# Patient Record
Sex: FEMALE | Race: OTHER | NOT HISPANIC OR LATINO
[De-identification: names, ages, dates, MRNs, and addresses within clinical notes are randomized per-mention and may not be internally consistent; named-entity substitution may affect disease eponyms.]

---

## 2017-01-05 ENCOUNTER — FORM ENCOUNTER (OUTPATIENT)
Age: 52
End: 2017-01-05

## 2017-01-06 ENCOUNTER — OUTPATIENT (OUTPATIENT)
Dept: OUTPATIENT SERVICES | Facility: HOSPITAL | Age: 52
LOS: 1 days | End: 2017-01-06
Payer: SELF-PAY

## 2017-01-06 ENCOUNTER — APPOINTMENT (OUTPATIENT)
Dept: ULTRASOUND IMAGING | Facility: IMAGING CENTER | Age: 52
End: 2017-01-06

## 2017-01-06 ENCOUNTER — APPOINTMENT (OUTPATIENT)
Dept: PEDIATRIC ALLERGY IMMUNOLOGY | Facility: CLINIC | Age: 52
End: 2017-01-06

## 2017-01-06 DIAGNOSIS — Z00.8 ENCOUNTER FOR OTHER GENERAL EXAMINATION: ICD-10-CM

## 2017-01-06 PROCEDURE — 76856 US EXAM PELVIC COMPLETE: CPT

## 2017-01-06 PROCEDURE — 76830 TRANSVAGINAL US NON-OB: CPT

## 2017-01-06 PROCEDURE — 76536 US EXAM OF HEAD AND NECK: CPT

## 2017-02-10 ENCOUNTER — APPOINTMENT (OUTPATIENT)
Dept: PEDIATRIC ALLERGY IMMUNOLOGY | Facility: CLINIC | Age: 52
End: 2017-02-10

## 2017-02-10 ENCOUNTER — OUTPATIENT (OUTPATIENT)
Dept: OUTPATIENT SERVICES | Facility: HOSPITAL | Age: 52
LOS: 1 days | End: 2017-02-10
Payer: SELF-PAY

## 2017-02-10 VITALS
WEIGHT: 215.99 LBS | OXYGEN SATURATION: 99 % | BODY MASS INDEX: 33.9 KG/M2 | SYSTOLIC BLOOD PRESSURE: 169 MMHG | HEART RATE: 61 BPM | HEIGHT: 67 IN | DIASTOLIC BLOOD PRESSURE: 99 MMHG

## 2017-02-10 DIAGNOSIS — Z91.018 ALLERGY TO OTHER FOODS: ICD-10-CM

## 2017-02-10 DIAGNOSIS — J30.9 ALLERGIC RHINITIS, UNSPECIFIED: ICD-10-CM

## 2017-02-10 PROCEDURE — G0463: CPT

## 2017-02-15 DIAGNOSIS — J30.89 OTHER ALLERGIC RHINITIS: ICD-10-CM

## 2017-02-15 DIAGNOSIS — H10.13 ACUTE ATOPIC CONJUNCTIVITIS, BILATERAL: ICD-10-CM

## 2017-02-15 DIAGNOSIS — Z91.018 ALLERGY TO OTHER FOODS: ICD-10-CM

## 2017-02-15 DIAGNOSIS — T78.1XXA OTHER ADVERSE FOOD REACTIONS, NOT ELSEWHERE CLASSIFIED, INITIAL ENCOUNTER: ICD-10-CM

## 2017-02-15 DIAGNOSIS — Z91.038 OTHER INSECT ALLERGY STATUS: ICD-10-CM

## 2017-02-15 DIAGNOSIS — Z91.010 ALLERGY TO PEANUTS: ICD-10-CM

## 2017-02-15 DIAGNOSIS — T78.1XXD OTHER ADVERSE FOOD REACTIONS, NOT ELSEWHERE CLASSIFIED, SUBSEQUENT ENCOUNTER: ICD-10-CM

## 2017-02-15 DIAGNOSIS — J30.1 ALLERGIC RHINITIS DUE TO POLLEN: ICD-10-CM

## 2017-02-15 DIAGNOSIS — J30.81 ALLERGIC RHINITIS DUE TO ANIMAL (CAT) (DOG) HAIR AND DANDER: ICD-10-CM

## 2017-02-24 ENCOUNTER — APPOINTMENT (OUTPATIENT)
Dept: INTERNAL MEDICINE | Facility: CLINIC | Age: 52
End: 2017-02-24

## 2017-03-24 ENCOUNTER — APPOINTMENT (OUTPATIENT)
Dept: INTERNAL MEDICINE | Facility: CLINIC | Age: 52
End: 2017-03-24

## 2017-03-31 ENCOUNTER — OUTPATIENT (OUTPATIENT)
Dept: OUTPATIENT SERVICES | Facility: HOSPITAL | Age: 52
LOS: 1 days | End: 2017-03-31
Payer: SELF-PAY

## 2017-03-31 ENCOUNTER — APPOINTMENT (OUTPATIENT)
Dept: INTERNAL MEDICINE | Facility: CLINIC | Age: 52
End: 2017-03-31

## 2017-03-31 ENCOUNTER — APPOINTMENT (OUTPATIENT)
Dept: PEDIATRIC ALLERGY IMMUNOLOGY | Facility: CLINIC | Age: 52
End: 2017-03-31

## 2017-03-31 VITALS
HEART RATE: 83 BPM | DIASTOLIC BLOOD PRESSURE: 88 MMHG | SYSTOLIC BLOOD PRESSURE: 135 MMHG | BODY MASS INDEX: 34.55 KG/M2 | WEIGHT: 220.59 LBS

## 2017-03-31 VITALS
WEIGHT: 220.56 LBS | SYSTOLIC BLOOD PRESSURE: 130 MMHG | HEIGHT: 67 IN | BODY MASS INDEX: 34.62 KG/M2 | DIASTOLIC BLOOD PRESSURE: 84 MMHG | HEART RATE: 80 BPM

## 2017-03-31 DIAGNOSIS — Z91.018 ALLERGY TO OTHER FOODS: ICD-10-CM

## 2017-03-31 DIAGNOSIS — J30.9 ALLERGIC RHINITIS, UNSPECIFIED: ICD-10-CM

## 2017-03-31 DIAGNOSIS — Z91.038 OTHER INSECT ALLERGY STATUS: ICD-10-CM

## 2017-03-31 DIAGNOSIS — I10 ESSENTIAL (PRIMARY) HYPERTENSION: ICD-10-CM

## 2017-03-31 DIAGNOSIS — T78.1XXA OTHER ADVERSE FOOD REACTIONS, NOT ELSEWHERE CLASSIFIED, INITIAL ENCOUNTER: ICD-10-CM

## 2017-03-31 DIAGNOSIS — H10.13 ACUTE ATOPIC CONJUNCTIVITIS, BILATERAL: ICD-10-CM

## 2017-03-31 PROCEDURE — 95024 IQ TESTS W/ALLERGENIC XTRCS: CPT

## 2017-03-31 PROCEDURE — G0463: CPT

## 2017-03-31 PROCEDURE — G0463: CPT | Mod: 25

## 2017-04-03 DIAGNOSIS — D17.1 BENIGN LIPOMATOUS NEOPLASM OF SKIN AND SUBCUTANEOUS TISSUE OF TRUNK: ICD-10-CM

## 2017-04-04 DIAGNOSIS — Z91.038 OTHER INSECT ALLERGY STATUS: ICD-10-CM

## 2017-04-04 DIAGNOSIS — J30.89 OTHER ALLERGIC RHINITIS: ICD-10-CM

## 2017-04-04 DIAGNOSIS — J30.81 ALLERGIC RHINITIS DUE TO ANIMAL (CAT) (DOG) HAIR AND DANDER: ICD-10-CM

## 2017-04-04 DIAGNOSIS — J30.1 ALLERGIC RHINITIS DUE TO POLLEN: ICD-10-CM

## 2017-04-04 DIAGNOSIS — H10.13 ACUTE ATOPIC CONJUNCTIVITIS, BILATERAL: ICD-10-CM

## 2017-04-14 ENCOUNTER — OUTPATIENT (OUTPATIENT)
Dept: OUTPATIENT SERVICES | Facility: HOSPITAL | Age: 52
LOS: 1 days | End: 2017-04-14
Payer: SELF-PAY

## 2017-04-14 ENCOUNTER — APPOINTMENT (OUTPATIENT)
Dept: PEDIATRIC ALLERGY IMMUNOLOGY | Facility: CLINIC | Age: 52
End: 2017-04-14

## 2017-04-14 DIAGNOSIS — J30.9 ALLERGIC RHINITIS, UNSPECIFIED: ICD-10-CM

## 2017-04-14 PROCEDURE — 95165 ANTIGEN THERAPY SERVICES: CPT

## 2017-04-14 PROCEDURE — 95117 IMMUNOTHERAPY INJECTIONS: CPT

## 2017-04-17 ENCOUNTER — OUTPATIENT (OUTPATIENT)
Dept: OUTPATIENT SERVICES | Facility: HOSPITAL | Age: 52
LOS: 1 days | End: 2017-04-17
Payer: SELF-PAY

## 2017-04-17 ENCOUNTER — APPOINTMENT (OUTPATIENT)
Dept: SURGERY | Facility: HOSPITAL | Age: 52
End: 2017-04-17

## 2017-04-17 VITALS
HEART RATE: 55 BPM | DIASTOLIC BLOOD PRESSURE: 82 MMHG | HEIGHT: 67 IN | RESPIRATION RATE: 14 BRPM | SYSTOLIC BLOOD PRESSURE: 139 MMHG | WEIGHT: 218 LBS | BODY MASS INDEX: 34.21 KG/M2

## 2017-04-17 DIAGNOSIS — L72.3 SEBACEOUS CYST: ICD-10-CM

## 2017-04-17 PROCEDURE — G0463: CPT

## 2017-04-18 ENCOUNTER — RX RENEWAL (OUTPATIENT)
Age: 52
End: 2017-04-18

## 2017-04-21 ENCOUNTER — OUTPATIENT (OUTPATIENT)
Dept: OUTPATIENT SERVICES | Facility: HOSPITAL | Age: 52
LOS: 1 days | End: 2017-04-21
Payer: SELF-PAY

## 2017-04-21 ENCOUNTER — APPOINTMENT (OUTPATIENT)
Dept: PEDIATRIC ALLERGY IMMUNOLOGY | Facility: CLINIC | Age: 52
End: 2017-04-21

## 2017-04-21 DIAGNOSIS — J30.9 ALLERGIC RHINITIS, UNSPECIFIED: ICD-10-CM

## 2017-04-21 PROCEDURE — 95117 IMMUNOTHERAPY INJECTIONS: CPT

## 2017-04-21 PROCEDURE — 95165 ANTIGEN THERAPY SERVICES: CPT

## 2017-04-24 DIAGNOSIS — Z51.6 ENCOUNTER FOR DESENSITIZATION TO ALLERGENS: ICD-10-CM

## 2017-04-24 DIAGNOSIS — J30.1 ALLERGIC RHINITIS DUE TO POLLEN: ICD-10-CM

## 2017-04-24 DIAGNOSIS — J30.89 OTHER ALLERGIC RHINITIS: ICD-10-CM

## 2017-04-24 DIAGNOSIS — J30.81 ALLERGIC RHINITIS DUE TO ANIMAL (CAT) (DOG) HAIR AND DANDER: ICD-10-CM

## 2017-04-27 DIAGNOSIS — Z51.6 ENCOUNTER FOR DESENSITIZATION TO ALLERGENS: ICD-10-CM

## 2017-04-27 DIAGNOSIS — J30.89 OTHER ALLERGIC RHINITIS: ICD-10-CM

## 2017-04-27 DIAGNOSIS — J30.81 ALLERGIC RHINITIS DUE TO ANIMAL (CAT) (DOG) HAIR AND DANDER: ICD-10-CM

## 2017-04-27 DIAGNOSIS — J30.1 ALLERGIC RHINITIS DUE TO POLLEN: ICD-10-CM

## 2017-04-28 ENCOUNTER — APPOINTMENT (OUTPATIENT)
Dept: PEDIATRIC ALLERGY IMMUNOLOGY | Facility: CLINIC | Age: 52
End: 2017-04-28

## 2017-04-28 ENCOUNTER — OUTPATIENT (OUTPATIENT)
Dept: OUTPATIENT SERVICES | Facility: HOSPITAL | Age: 52
LOS: 1 days | End: 2017-04-28
Payer: SELF-PAY

## 2017-04-28 DIAGNOSIS — J30.9 ALLERGIC RHINITIS, UNSPECIFIED: ICD-10-CM

## 2017-04-28 PROCEDURE — 95165 ANTIGEN THERAPY SERVICES: CPT

## 2017-04-28 PROCEDURE — 95117 IMMUNOTHERAPY INJECTIONS: CPT

## 2017-05-04 DIAGNOSIS — J30.81 ALLERGIC RHINITIS DUE TO ANIMAL (CAT) (DOG) HAIR AND DANDER: ICD-10-CM

## 2017-05-04 DIAGNOSIS — J30.1 ALLERGIC RHINITIS DUE TO POLLEN: ICD-10-CM

## 2017-05-04 DIAGNOSIS — Z51.6 ENCOUNTER FOR DESENSITIZATION TO ALLERGENS: ICD-10-CM

## 2017-05-04 DIAGNOSIS — J30.89 OTHER ALLERGIC RHINITIS: ICD-10-CM

## 2017-05-05 ENCOUNTER — OUTPATIENT (OUTPATIENT)
Dept: OUTPATIENT SERVICES | Facility: HOSPITAL | Age: 52
LOS: 1 days | End: 2017-05-05
Payer: SELF-PAY

## 2017-05-05 ENCOUNTER — APPOINTMENT (OUTPATIENT)
Dept: PEDIATRIC ALLERGY IMMUNOLOGY | Facility: CLINIC | Age: 52
End: 2017-05-05

## 2017-05-05 DIAGNOSIS — J30.9 ALLERGIC RHINITIS, UNSPECIFIED: ICD-10-CM

## 2017-05-05 PROCEDURE — 95165 ANTIGEN THERAPY SERVICES: CPT

## 2017-05-05 PROCEDURE — 95117 IMMUNOTHERAPY INJECTIONS: CPT

## 2017-05-08 DIAGNOSIS — J30.81 ALLERGIC RHINITIS DUE TO ANIMAL (CAT) (DOG) HAIR AND DANDER: ICD-10-CM

## 2017-05-08 DIAGNOSIS — J30.1 ALLERGIC RHINITIS DUE TO POLLEN: ICD-10-CM

## 2017-05-08 DIAGNOSIS — Z51.6 ENCOUNTER FOR DESENSITIZATION TO ALLERGENS: ICD-10-CM

## 2017-05-08 DIAGNOSIS — J30.89 OTHER ALLERGIC RHINITIS: ICD-10-CM

## 2017-05-12 ENCOUNTER — APPOINTMENT (OUTPATIENT)
Dept: PEDIATRIC ALLERGY IMMUNOLOGY | Facility: CLINIC | Age: 52
End: 2017-05-12

## 2017-05-12 ENCOUNTER — OUTPATIENT (OUTPATIENT)
Dept: OUTPATIENT SERVICES | Facility: HOSPITAL | Age: 52
LOS: 1 days | End: 2017-05-12
Payer: SELF-PAY

## 2017-05-12 DIAGNOSIS — J30.9 ALLERGIC RHINITIS, UNSPECIFIED: ICD-10-CM

## 2017-05-12 PROCEDURE — 95117 IMMUNOTHERAPY INJECTIONS: CPT

## 2017-05-12 PROCEDURE — 95165 ANTIGEN THERAPY SERVICES: CPT

## 2017-05-19 ENCOUNTER — OUTPATIENT (OUTPATIENT)
Dept: OUTPATIENT SERVICES | Facility: HOSPITAL | Age: 52
LOS: 1 days | End: 2017-05-19
Payer: SELF-PAY

## 2017-05-19 ENCOUNTER — APPOINTMENT (OUTPATIENT)
Dept: PEDIATRIC ALLERGY IMMUNOLOGY | Facility: CLINIC | Age: 52
End: 2017-05-19

## 2017-05-19 DIAGNOSIS — J30.9 ALLERGIC RHINITIS, UNSPECIFIED: ICD-10-CM

## 2017-05-19 PROCEDURE — 95165 ANTIGEN THERAPY SERVICES: CPT

## 2017-05-19 PROCEDURE — 95117 IMMUNOTHERAPY INJECTIONS: CPT

## 2017-05-22 DIAGNOSIS — J30.89 OTHER ALLERGIC RHINITIS: ICD-10-CM

## 2017-05-22 DIAGNOSIS — J30.81 ALLERGIC RHINITIS DUE TO ANIMAL (CAT) (DOG) HAIR AND DANDER: ICD-10-CM

## 2017-05-22 DIAGNOSIS — J30.1 ALLERGIC RHINITIS DUE TO POLLEN: ICD-10-CM

## 2017-05-22 DIAGNOSIS — Z51.6 ENCOUNTER FOR DESENSITIZATION TO ALLERGENS: ICD-10-CM

## 2017-05-23 DIAGNOSIS — J30.89 OTHER ALLERGIC RHINITIS: ICD-10-CM

## 2017-05-23 DIAGNOSIS — Z51.6 ENCOUNTER FOR DESENSITIZATION TO ALLERGENS: ICD-10-CM

## 2017-05-23 DIAGNOSIS — J30.1 ALLERGIC RHINITIS DUE TO POLLEN: ICD-10-CM

## 2017-05-23 DIAGNOSIS — J30.81 ALLERGIC RHINITIS DUE TO ANIMAL (CAT) (DOG) HAIR AND DANDER: ICD-10-CM

## 2017-05-26 ENCOUNTER — OUTPATIENT (OUTPATIENT)
Dept: OUTPATIENT SERVICES | Facility: HOSPITAL | Age: 52
LOS: 1 days | End: 2017-05-26
Payer: SELF-PAY

## 2017-05-26 ENCOUNTER — APPOINTMENT (OUTPATIENT)
Dept: PEDIATRIC ALLERGY IMMUNOLOGY | Facility: CLINIC | Age: 52
End: 2017-05-26

## 2017-05-26 DIAGNOSIS — J30.9 ALLERGIC RHINITIS, UNSPECIFIED: ICD-10-CM

## 2017-05-26 PROCEDURE — 95117 IMMUNOTHERAPY INJECTIONS: CPT

## 2017-05-26 PROCEDURE — 95165 ANTIGEN THERAPY SERVICES: CPT

## 2017-06-02 ENCOUNTER — RX RENEWAL (OUTPATIENT)
Age: 52
End: 2017-06-02

## 2017-06-02 ENCOUNTER — APPOINTMENT (OUTPATIENT)
Dept: PEDIATRIC ALLERGY IMMUNOLOGY | Facility: CLINIC | Age: 52
End: 2017-06-02

## 2017-06-02 ENCOUNTER — OUTPATIENT (OUTPATIENT)
Dept: OUTPATIENT SERVICES | Facility: HOSPITAL | Age: 52
LOS: 1 days | End: 2017-06-02
Payer: SELF-PAY

## 2017-06-02 DIAGNOSIS — J30.9 ALLERGIC RHINITIS, UNSPECIFIED: ICD-10-CM

## 2017-06-02 PROCEDURE — 95165 ANTIGEN THERAPY SERVICES: CPT

## 2017-06-02 PROCEDURE — 95117 IMMUNOTHERAPY INJECTIONS: CPT

## 2017-06-05 DIAGNOSIS — J30.89 OTHER ALLERGIC RHINITIS: ICD-10-CM

## 2017-06-05 DIAGNOSIS — Z51.6 ENCOUNTER FOR DESENSITIZATION TO ALLERGENS: ICD-10-CM

## 2017-06-05 DIAGNOSIS — J30.1 ALLERGIC RHINITIS DUE TO POLLEN: ICD-10-CM

## 2017-06-05 DIAGNOSIS — J30.81 ALLERGIC RHINITIS DUE TO ANIMAL (CAT) (DOG) HAIR AND DANDER: ICD-10-CM

## 2017-06-08 DIAGNOSIS — J30.81 ALLERGIC RHINITIS DUE TO ANIMAL (CAT) (DOG) HAIR AND DANDER: ICD-10-CM

## 2017-06-08 DIAGNOSIS — Z51.6 ENCOUNTER FOR DESENSITIZATION TO ALLERGENS: ICD-10-CM

## 2017-06-08 DIAGNOSIS — J30.89 OTHER ALLERGIC RHINITIS: ICD-10-CM

## 2017-06-08 DIAGNOSIS — J30.1 ALLERGIC RHINITIS DUE TO POLLEN: ICD-10-CM

## 2017-06-09 ENCOUNTER — APPOINTMENT (OUTPATIENT)
Dept: PEDIATRIC ALLERGY IMMUNOLOGY | Facility: CLINIC | Age: 52
End: 2017-06-09

## 2017-06-16 ENCOUNTER — OUTPATIENT (OUTPATIENT)
Dept: OUTPATIENT SERVICES | Facility: HOSPITAL | Age: 52
LOS: 1 days | End: 2017-06-16
Payer: SELF-PAY

## 2017-06-16 ENCOUNTER — APPOINTMENT (OUTPATIENT)
Dept: PEDIATRIC ALLERGY IMMUNOLOGY | Facility: CLINIC | Age: 52
End: 2017-06-16

## 2017-06-16 DIAGNOSIS — J30.9 ALLERGIC RHINITIS, UNSPECIFIED: ICD-10-CM

## 2017-06-16 PROCEDURE — 95165 ANTIGEN THERAPY SERVICES: CPT

## 2017-06-16 PROCEDURE — 95117 IMMUNOTHERAPY INJECTIONS: CPT

## 2017-06-23 DIAGNOSIS — J30.81 ALLERGIC RHINITIS DUE TO ANIMAL (CAT) (DOG) HAIR AND DANDER: ICD-10-CM

## 2017-06-23 DIAGNOSIS — Z51.6 ENCOUNTER FOR DESENSITIZATION TO ALLERGENS: ICD-10-CM

## 2017-06-23 DIAGNOSIS — J30.89 OTHER ALLERGIC RHINITIS: ICD-10-CM

## 2017-06-23 DIAGNOSIS — J30.1 ALLERGIC RHINITIS DUE TO POLLEN: ICD-10-CM

## 2017-06-30 ENCOUNTER — APPOINTMENT (OUTPATIENT)
Dept: PEDIATRIC ALLERGY IMMUNOLOGY | Facility: CLINIC | Age: 52
End: 2017-06-30

## 2017-06-30 ENCOUNTER — OUTPATIENT (OUTPATIENT)
Dept: OUTPATIENT SERVICES | Facility: HOSPITAL | Age: 52
LOS: 1 days | End: 2017-06-30
Payer: SELF-PAY

## 2017-06-30 DIAGNOSIS — J30.9 ALLERGIC RHINITIS, UNSPECIFIED: ICD-10-CM

## 2017-06-30 PROCEDURE — 95165 ANTIGEN THERAPY SERVICES: CPT

## 2017-06-30 PROCEDURE — 95117 IMMUNOTHERAPY INJECTIONS: CPT

## 2017-07-03 DIAGNOSIS — Z51.6 ENCOUNTER FOR DESENSITIZATION TO ALLERGENS: ICD-10-CM

## 2017-07-03 DIAGNOSIS — J30.81 ALLERGIC RHINITIS DUE TO ANIMAL (CAT) (DOG) HAIR AND DANDER: ICD-10-CM

## 2017-07-03 DIAGNOSIS — J30.89 OTHER ALLERGIC RHINITIS: ICD-10-CM

## 2017-07-03 DIAGNOSIS — J30.1 ALLERGIC RHINITIS DUE TO POLLEN: ICD-10-CM

## 2017-07-07 ENCOUNTER — OUTPATIENT (OUTPATIENT)
Dept: OUTPATIENT SERVICES | Facility: HOSPITAL | Age: 52
LOS: 1 days | End: 2017-07-07
Payer: SELF-PAY

## 2017-07-07 ENCOUNTER — APPOINTMENT (OUTPATIENT)
Dept: PEDIATRIC ALLERGY IMMUNOLOGY | Facility: CLINIC | Age: 52
End: 2017-07-07

## 2017-07-07 DIAGNOSIS — J30.9 ALLERGIC RHINITIS, UNSPECIFIED: ICD-10-CM

## 2017-07-07 PROCEDURE — 95165 ANTIGEN THERAPY SERVICES: CPT

## 2017-07-07 PROCEDURE — 95117 IMMUNOTHERAPY INJECTIONS: CPT

## 2017-07-14 ENCOUNTER — APPOINTMENT (OUTPATIENT)
Dept: PEDIATRIC ALLERGY IMMUNOLOGY | Facility: CLINIC | Age: 52
End: 2017-07-14

## 2017-07-14 ENCOUNTER — OUTPATIENT (OUTPATIENT)
Dept: OUTPATIENT SERVICES | Facility: HOSPITAL | Age: 52
LOS: 1 days | End: 2017-07-14
Payer: SELF-PAY

## 2017-07-14 DIAGNOSIS — J30.89 OTHER ALLERGIC RHINITIS: ICD-10-CM

## 2017-07-14 DIAGNOSIS — J30.81 ALLERGIC RHINITIS DUE TO ANIMAL (CAT) (DOG) HAIR AND DANDER: ICD-10-CM

## 2017-07-14 DIAGNOSIS — J30.9 ALLERGIC RHINITIS, UNSPECIFIED: ICD-10-CM

## 2017-07-14 DIAGNOSIS — Z51.6 ENCOUNTER FOR DESENSITIZATION TO ALLERGENS: ICD-10-CM

## 2017-07-14 DIAGNOSIS — J30.1 ALLERGIC RHINITIS DUE TO POLLEN: ICD-10-CM

## 2017-07-14 PROCEDURE — 95117 IMMUNOTHERAPY INJECTIONS: CPT

## 2017-07-14 PROCEDURE — 95165 ANTIGEN THERAPY SERVICES: CPT

## 2017-07-17 ENCOUNTER — APPOINTMENT (OUTPATIENT)
Dept: OTOLARYNGOLOGY | Facility: CLINIC | Age: 52
End: 2017-07-17

## 2017-07-17 DIAGNOSIS — J30.89 OTHER ALLERGIC RHINITIS: ICD-10-CM

## 2017-07-17 DIAGNOSIS — Z51.6 ENCOUNTER FOR DESENSITIZATION TO ALLERGENS: ICD-10-CM

## 2017-07-17 DIAGNOSIS — J30.81 ALLERGIC RHINITIS DUE TO ANIMAL (CAT) (DOG) HAIR AND DANDER: ICD-10-CM

## 2017-07-17 DIAGNOSIS — J30.1 ALLERGIC RHINITIS DUE TO POLLEN: ICD-10-CM

## 2017-07-21 ENCOUNTER — OUTPATIENT (OUTPATIENT)
Dept: OUTPATIENT SERVICES | Facility: HOSPITAL | Age: 52
LOS: 1 days | End: 2017-07-21
Payer: SELF-PAY

## 2017-07-21 ENCOUNTER — APPOINTMENT (OUTPATIENT)
Dept: PEDIATRIC ALLERGY IMMUNOLOGY | Facility: CLINIC | Age: 52
End: 2017-07-21

## 2017-07-21 DIAGNOSIS — J30.9 ALLERGIC RHINITIS, UNSPECIFIED: ICD-10-CM

## 2017-07-21 PROCEDURE — 95117 IMMUNOTHERAPY INJECTIONS: CPT

## 2017-07-21 PROCEDURE — 95165 ANTIGEN THERAPY SERVICES: CPT

## 2017-07-25 DIAGNOSIS — J30.89 OTHER ALLERGIC RHINITIS: ICD-10-CM

## 2017-07-25 DIAGNOSIS — J30.1 ALLERGIC RHINITIS DUE TO POLLEN: ICD-10-CM

## 2017-07-25 DIAGNOSIS — Z51.6 ENCOUNTER FOR DESENSITIZATION TO ALLERGENS: ICD-10-CM

## 2017-07-25 DIAGNOSIS — J30.81 ALLERGIC RHINITIS DUE TO ANIMAL (CAT) (DOG) HAIR AND DANDER: ICD-10-CM

## 2017-07-28 ENCOUNTER — OUTPATIENT (OUTPATIENT)
Dept: OUTPATIENT SERVICES | Facility: HOSPITAL | Age: 52
LOS: 1 days | End: 2017-07-28
Payer: SELF-PAY

## 2017-07-28 ENCOUNTER — APPOINTMENT (OUTPATIENT)
Dept: PEDIATRIC ALLERGY IMMUNOLOGY | Facility: CLINIC | Age: 52
End: 2017-07-28
Payer: COMMERCIAL

## 2017-07-28 DIAGNOSIS — J30.9 ALLERGIC RHINITIS, UNSPECIFIED: ICD-10-CM

## 2017-07-28 PROCEDURE — 95165 ANTIGEN THERAPY SERVICES: CPT | Mod: NC

## 2017-07-28 PROCEDURE — 95165 ANTIGEN THERAPY SERVICES: CPT

## 2017-07-28 PROCEDURE — 95117 IMMUNOTHERAPY INJECTIONS: CPT | Mod: NC

## 2017-07-28 PROCEDURE — 95117 IMMUNOTHERAPY INJECTIONS: CPT

## 2017-08-03 DIAGNOSIS — Z51.6 ENCOUNTER FOR DESENSITIZATION TO ALLERGENS: ICD-10-CM

## 2017-08-03 DIAGNOSIS — J30.81 ALLERGIC RHINITIS DUE TO ANIMAL (CAT) (DOG) HAIR AND DANDER: ICD-10-CM

## 2017-08-03 DIAGNOSIS — J30.1 ALLERGIC RHINITIS DUE TO POLLEN: ICD-10-CM

## 2017-08-03 DIAGNOSIS — J30.89 OTHER ALLERGIC RHINITIS: ICD-10-CM

## 2017-08-04 ENCOUNTER — APPOINTMENT (OUTPATIENT)
Dept: PEDIATRIC ALLERGY IMMUNOLOGY | Facility: CLINIC | Age: 52
End: 2017-08-04

## 2017-08-11 ENCOUNTER — APPOINTMENT (OUTPATIENT)
Dept: PEDIATRIC ALLERGY IMMUNOLOGY | Facility: CLINIC | Age: 52
End: 2017-08-11
Payer: COMMERCIAL

## 2017-08-11 ENCOUNTER — OUTPATIENT (OUTPATIENT)
Dept: OUTPATIENT SERVICES | Facility: HOSPITAL | Age: 52
LOS: 1 days | End: 2017-08-11
Payer: SELF-PAY

## 2017-08-11 DIAGNOSIS — J30.9 ALLERGIC RHINITIS, UNSPECIFIED: ICD-10-CM

## 2017-08-11 PROCEDURE — 95117 IMMUNOTHERAPY INJECTIONS: CPT | Mod: NC

## 2017-08-11 PROCEDURE — 95165 ANTIGEN THERAPY SERVICES: CPT | Mod: NC

## 2017-08-11 PROCEDURE — 95165 ANTIGEN THERAPY SERVICES: CPT

## 2017-08-11 PROCEDURE — 95117 IMMUNOTHERAPY INJECTIONS: CPT

## 2017-08-18 ENCOUNTER — APPOINTMENT (OUTPATIENT)
Dept: PEDIATRIC ALLERGY IMMUNOLOGY | Facility: CLINIC | Age: 52
End: 2017-08-18
Payer: COMMERCIAL

## 2017-08-18 ENCOUNTER — OUTPATIENT (OUTPATIENT)
Dept: OUTPATIENT SERVICES | Facility: HOSPITAL | Age: 52
LOS: 1 days | End: 2017-08-18
Payer: SELF-PAY

## 2017-08-18 VITALS
SYSTOLIC BLOOD PRESSURE: 146 MMHG | OXYGEN SATURATION: 98 % | HEART RATE: 59 BPM | HEIGHT: 67 IN | DIASTOLIC BLOOD PRESSURE: 90 MMHG | BODY MASS INDEX: 34.88 KG/M2 | WEIGHT: 222.2 LBS

## 2017-08-18 DIAGNOSIS — J30.81 ALLERGIC RHINITIS DUE TO ANIMAL (CAT) (DOG) HAIR AND DANDER: ICD-10-CM

## 2017-08-18 DIAGNOSIS — J01.90 ACUTE SINUSITIS, UNSPECIFIED: ICD-10-CM

## 2017-08-18 DIAGNOSIS — Z51.6 ENCOUNTER FOR DESENSITIZATION TO ALLERGENS: ICD-10-CM

## 2017-08-18 DIAGNOSIS — J30.1 ALLERGIC RHINITIS DUE TO POLLEN: ICD-10-CM

## 2017-08-18 DIAGNOSIS — J30.9 ALLERGIC RHINITIS, UNSPECIFIED: ICD-10-CM

## 2017-08-18 DIAGNOSIS — J30.89 OTHER ALLERGIC RHINITIS: ICD-10-CM

## 2017-08-18 PROCEDURE — 95115 IMMUNOTHERAPY ONE INJECTION: CPT | Mod: NC

## 2017-08-18 PROCEDURE — 95165 ANTIGEN THERAPY SERVICES: CPT | Mod: NC

## 2017-08-18 PROCEDURE — 95165 ANTIGEN THERAPY SERVICES: CPT

## 2017-08-18 PROCEDURE — 95115 IMMUNOTHERAPY ONE INJECTION: CPT

## 2017-08-18 PROCEDURE — 99213 OFFICE O/P EST LOW 20 MIN: CPT | Mod: 25,NC

## 2017-08-18 PROCEDURE — G0463: CPT | Mod: 25

## 2017-08-21 DIAGNOSIS — J30.1 ALLERGIC RHINITIS DUE TO POLLEN: ICD-10-CM

## 2017-08-21 DIAGNOSIS — J30.89 OTHER ALLERGIC RHINITIS: ICD-10-CM

## 2017-08-21 DIAGNOSIS — J30.81 ALLERGIC RHINITIS DUE TO ANIMAL (CAT) (DOG) HAIR AND DANDER: ICD-10-CM

## 2017-08-21 DIAGNOSIS — Z51.6 ENCOUNTER FOR DESENSITIZATION TO ALLERGENS: ICD-10-CM

## 2017-08-25 ENCOUNTER — OUTPATIENT (OUTPATIENT)
Dept: OUTPATIENT SERVICES | Facility: HOSPITAL | Age: 52
LOS: 1 days | End: 2017-08-25
Payer: SELF-PAY

## 2017-08-25 ENCOUNTER — APPOINTMENT (OUTPATIENT)
Dept: PEDIATRIC ALLERGY IMMUNOLOGY | Facility: CLINIC | Age: 52
End: 2017-08-25
Payer: COMMERCIAL

## 2017-08-25 DIAGNOSIS — J30.9 ALLERGIC RHINITIS, UNSPECIFIED: ICD-10-CM

## 2017-08-25 PROCEDURE — 95117 IMMUNOTHERAPY INJECTIONS: CPT | Mod: NC

## 2017-08-25 PROCEDURE — 95165 ANTIGEN THERAPY SERVICES: CPT

## 2017-08-25 PROCEDURE — 95165 ANTIGEN THERAPY SERVICES: CPT | Mod: NC

## 2017-08-25 PROCEDURE — 95117 IMMUNOTHERAPY INJECTIONS: CPT

## 2017-08-30 DIAGNOSIS — Z51.6 ENCOUNTER FOR DESENSITIZATION TO ALLERGENS: ICD-10-CM

## 2017-08-30 DIAGNOSIS — J30.1 ALLERGIC RHINITIS DUE TO POLLEN: ICD-10-CM

## 2017-08-30 DIAGNOSIS — J30.81 ALLERGIC RHINITIS DUE TO ANIMAL (CAT) (DOG) HAIR AND DANDER: ICD-10-CM

## 2017-08-30 DIAGNOSIS — J30.89 OTHER ALLERGIC RHINITIS: ICD-10-CM

## 2017-09-08 ENCOUNTER — LABORATORY RESULT (OUTPATIENT)
Age: 52
End: 2017-09-08

## 2017-09-08 ENCOUNTER — OUTPATIENT (OUTPATIENT)
Dept: OUTPATIENT SERVICES | Facility: HOSPITAL | Age: 52
LOS: 1 days | End: 2017-09-08
Payer: SELF-PAY

## 2017-09-08 ENCOUNTER — APPOINTMENT (OUTPATIENT)
Dept: INTERNAL MEDICINE | Facility: CLINIC | Age: 52
End: 2017-09-08
Payer: COMMERCIAL

## 2017-09-08 ENCOUNTER — APPOINTMENT (OUTPATIENT)
Dept: PEDIATRIC ALLERGY IMMUNOLOGY | Facility: CLINIC | Age: 52
End: 2017-09-08
Payer: COMMERCIAL

## 2017-09-08 ENCOUNTER — APPOINTMENT (OUTPATIENT)
Dept: OBGYN | Facility: CLINIC | Age: 52
End: 2017-09-08
Payer: COMMERCIAL

## 2017-09-08 VITALS
SYSTOLIC BLOOD PRESSURE: 138 MMHG | DIASTOLIC BLOOD PRESSURE: 100 MMHG | BODY MASS INDEX: 34.84 KG/M2 | WEIGHT: 222 LBS | HEIGHT: 67 IN

## 2017-09-08 VITALS
HEART RATE: 73 BPM | RESPIRATION RATE: 18 BRPM | BODY MASS INDEX: 34.37 KG/M2 | HEIGHT: 67 IN | OXYGEN SATURATION: 98 % | WEIGHT: 219 LBS | SYSTOLIC BLOOD PRESSURE: 150 MMHG | DIASTOLIC BLOOD PRESSURE: 68 MMHG

## 2017-09-08 DIAGNOSIS — I10 ESSENTIAL (PRIMARY) HYPERTENSION: ICD-10-CM

## 2017-09-08 DIAGNOSIS — N76.0 ACUTE VAGINITIS: ICD-10-CM

## 2017-09-08 DIAGNOSIS — J30.9 ALLERGIC RHINITIS, UNSPECIFIED: ICD-10-CM

## 2017-09-08 DIAGNOSIS — R30.0 DYSURIA: ICD-10-CM

## 2017-09-08 PROCEDURE — 95117 IMMUNOTHERAPY INJECTIONS: CPT | Mod: NC

## 2017-09-08 PROCEDURE — 99213 OFFICE O/P EST LOW 20 MIN: CPT | Mod: NC

## 2017-09-08 PROCEDURE — 84439 ASSAY OF FREE THYROXINE: CPT

## 2017-09-08 PROCEDURE — 83036 HEMOGLOBIN GLYCOSYLATED A1C: CPT

## 2017-09-08 PROCEDURE — 80048 BASIC METABOLIC PNL TOTAL CA: CPT

## 2017-09-08 PROCEDURE — G0463: CPT

## 2017-09-08 PROCEDURE — 87086 URINE CULTURE/COLONY COUNT: CPT

## 2017-09-08 PROCEDURE — 95165 ANTIGEN THERAPY SERVICES: CPT

## 2017-09-08 PROCEDURE — 84443 ASSAY THYROID STIM HORMONE: CPT

## 2017-09-08 PROCEDURE — 85027 COMPLETE CBC AUTOMATED: CPT

## 2017-09-08 PROCEDURE — 80061 LIPID PANEL: CPT

## 2017-09-08 PROCEDURE — 95165 ANTIGEN THERAPY SERVICES: CPT | Mod: NC

## 2017-09-08 PROCEDURE — 95117 IMMUNOTHERAPY INJECTIONS: CPT

## 2017-09-08 RX ORDER — AMOXICILLIN AND CLAVULANATE POTASSIUM 875; 125 MG/1; MG/1
875-125 TABLET, COATED ORAL
Qty: 28 | Refills: 0 | Status: COMPLETED | COMMUNITY
Start: 2017-08-18 | End: 2017-09-08

## 2017-09-09 LAB
ANION GAP SERPL CALC-SCNC: 16 MMOL/L — SIGNIFICANT CHANGE UP (ref 5–17)
BUN SERPL-MCNC: 10 MG/DL — SIGNIFICANT CHANGE UP (ref 7–23)
CALCIUM SERPL-MCNC: 9.8 MG/DL — SIGNIFICANT CHANGE UP (ref 8.4–10.5)
CHLORIDE SERPL-SCNC: 102 MMOL/L — SIGNIFICANT CHANGE UP (ref 96–108)
CHOLEST SERPL-MCNC: 186 MG/DL — SIGNIFICANT CHANGE UP (ref 10–199)
CO2 SERPL-SCNC: 24 MMOL/L — SIGNIFICANT CHANGE UP (ref 22–31)
CREAT SERPL-MCNC: 0.92 MG/DL — SIGNIFICANT CHANGE UP (ref 0.5–1.3)
CULTURE RESULTS: NO GROWTH — SIGNIFICANT CHANGE UP
GLUCOSE SERPL-MCNC: 95 MG/DL — SIGNIFICANT CHANGE UP (ref 70–99)
HBA1C BLD-MCNC: 5.4 % — SIGNIFICANT CHANGE UP (ref 4–5.6)
HCT VFR BLD CALC: 39.9 % — SIGNIFICANT CHANGE UP (ref 34.5–45)
HDLC SERPL-MCNC: 46 MG/DL — SIGNIFICANT CHANGE UP (ref 40–125)
HGB BLD-MCNC: 12 G/DL — SIGNIFICANT CHANGE UP (ref 11.5–15.5)
LIPID PNL WITH DIRECT LDL SERPL: 125 MG/DL — SIGNIFICANT CHANGE UP
MCHC RBC-ENTMCNC: 27.1 PG — SIGNIFICANT CHANGE UP (ref 27–34)
MCHC RBC-ENTMCNC: 30.1 GM/DL — LOW (ref 32–36)
MCV RBC AUTO: 90.3 FL — SIGNIFICANT CHANGE UP (ref 80–100)
PLATELET # BLD AUTO: 304 K/UL — SIGNIFICANT CHANGE UP (ref 150–400)
POTASSIUM SERPL-MCNC: 5.2 MMOL/L — SIGNIFICANT CHANGE UP (ref 3.5–5.3)
POTASSIUM SERPL-SCNC: 5.2 MMOL/L — SIGNIFICANT CHANGE UP (ref 3.5–5.3)
RBC # BLD: 4.42 M/UL — SIGNIFICANT CHANGE UP (ref 3.8–5.2)
RBC # FLD: 14.1 % — SIGNIFICANT CHANGE UP (ref 10.3–14.5)
SODIUM SERPL-SCNC: 142 MMOL/L — SIGNIFICANT CHANGE UP (ref 135–145)
SPECIMEN SOURCE: SIGNIFICANT CHANGE UP
T4 FREE SERPL-MCNC: 1 NG/DL — SIGNIFICANT CHANGE UP (ref 0.9–1.8)
TOTAL CHOLESTEROL/HDL RATIO MEASUREMENT: 4 RATIO — SIGNIFICANT CHANGE UP (ref 3.3–7.1)
TRIGL SERPL-MCNC: 76 MG/DL — SIGNIFICANT CHANGE UP (ref 10–149)
TSH SERPL-MCNC: 1.8 UIU/ML — SIGNIFICANT CHANGE UP (ref 0.27–4.2)
WBC # BLD: 6.04 K/UL — SIGNIFICANT CHANGE UP (ref 3.8–10.5)
WBC # FLD AUTO: 6.04 K/UL — SIGNIFICANT CHANGE UP (ref 3.8–10.5)

## 2017-09-11 DIAGNOSIS — J30.1 ALLERGIC RHINITIS DUE TO POLLEN: ICD-10-CM

## 2017-09-11 DIAGNOSIS — Z51.6 ENCOUNTER FOR DESENSITIZATION TO ALLERGENS: ICD-10-CM

## 2017-09-11 DIAGNOSIS — J30.81 ALLERGIC RHINITIS DUE TO ANIMAL (CAT) (DOG) HAIR AND DANDER: ICD-10-CM

## 2017-09-11 DIAGNOSIS — J30.89 OTHER ALLERGIC RHINITIS: ICD-10-CM

## 2017-09-15 ENCOUNTER — APPOINTMENT (OUTPATIENT)
Dept: PEDIATRIC ALLERGY IMMUNOLOGY | Facility: CLINIC | Age: 52
End: 2017-09-15

## 2017-09-21 DIAGNOSIS — E66.9 OBESITY, UNSPECIFIED: ICD-10-CM

## 2017-09-22 ENCOUNTER — APPOINTMENT (OUTPATIENT)
Dept: PEDIATRIC ALLERGY IMMUNOLOGY | Facility: CLINIC | Age: 52
End: 2017-09-22
Payer: COMMERCIAL

## 2017-09-22 ENCOUNTER — OUTPATIENT (OUTPATIENT)
Dept: OUTPATIENT SERVICES | Facility: HOSPITAL | Age: 52
LOS: 1 days | End: 2017-09-22
Payer: SELF-PAY

## 2017-09-22 DIAGNOSIS — J30.9 ALLERGIC RHINITIS, UNSPECIFIED: ICD-10-CM

## 2017-09-22 PROCEDURE — 95165 ANTIGEN THERAPY SERVICES: CPT | Mod: NC

## 2017-09-22 PROCEDURE — 95165 ANTIGEN THERAPY SERVICES: CPT

## 2017-09-22 PROCEDURE — 95117 IMMUNOTHERAPY INJECTIONS: CPT | Mod: NC

## 2017-09-22 PROCEDURE — 95117 IMMUNOTHERAPY INJECTIONS: CPT

## 2017-09-25 DIAGNOSIS — J30.81 ALLERGIC RHINITIS DUE TO ANIMAL (CAT) (DOG) HAIR AND DANDER: ICD-10-CM

## 2017-09-25 DIAGNOSIS — J30.1 ALLERGIC RHINITIS DUE TO POLLEN: ICD-10-CM

## 2017-09-25 DIAGNOSIS — J30.89 OTHER ALLERGIC RHINITIS: ICD-10-CM

## 2017-09-25 DIAGNOSIS — Z51.6 ENCOUNTER FOR DESENSITIZATION TO ALLERGENS: ICD-10-CM

## 2017-09-29 ENCOUNTER — APPOINTMENT (OUTPATIENT)
Dept: OPHTHALMOLOGY | Facility: CLINIC | Age: 52
End: 2017-09-29

## 2017-09-29 ENCOUNTER — APPOINTMENT (OUTPATIENT)
Dept: PEDIATRIC ALLERGY IMMUNOLOGY | Facility: CLINIC | Age: 52
End: 2017-09-29
Payer: COMMERCIAL

## 2017-09-29 ENCOUNTER — OUTPATIENT (OUTPATIENT)
Dept: OUTPATIENT SERVICES | Facility: HOSPITAL | Age: 52
LOS: 1 days | End: 2017-09-29
Payer: SELF-PAY

## 2017-09-29 DIAGNOSIS — J30.9 ALLERGIC RHINITIS, UNSPECIFIED: ICD-10-CM

## 2017-09-29 PROCEDURE — 95165 ANTIGEN THERAPY SERVICES: CPT | Mod: NC

## 2017-09-29 PROCEDURE — 95165 ANTIGEN THERAPY SERVICES: CPT

## 2017-09-29 PROCEDURE — 95117 IMMUNOTHERAPY INJECTIONS: CPT

## 2017-09-29 PROCEDURE — 95117 IMMUNOTHERAPY INJECTIONS: CPT | Mod: NC

## 2017-10-02 DIAGNOSIS — Z51.6 ENCOUNTER FOR DESENSITIZATION TO ALLERGENS: ICD-10-CM

## 2017-10-02 DIAGNOSIS — J30.81 ALLERGIC RHINITIS DUE TO ANIMAL (CAT) (DOG) HAIR AND DANDER: ICD-10-CM

## 2017-10-02 DIAGNOSIS — J30.1 ALLERGIC RHINITIS DUE TO POLLEN: ICD-10-CM

## 2017-10-02 DIAGNOSIS — J30.89 OTHER ALLERGIC RHINITIS: ICD-10-CM

## 2017-10-06 ENCOUNTER — APPOINTMENT (OUTPATIENT)
Dept: PEDIATRIC ALLERGY IMMUNOLOGY | Facility: CLINIC | Age: 52
End: 2017-10-06
Payer: COMMERCIAL

## 2017-10-06 ENCOUNTER — OUTPATIENT (OUTPATIENT)
Dept: OUTPATIENT SERVICES | Facility: HOSPITAL | Age: 52
LOS: 1 days | End: 2017-10-06
Payer: SELF-PAY

## 2017-10-06 VITALS
HEART RATE: 56 BPM | BODY MASS INDEX: 35 KG/M2 | SYSTOLIC BLOOD PRESSURE: 135 MMHG | HEIGHT: 67 IN | OXYGEN SATURATION: 98 % | DIASTOLIC BLOOD PRESSURE: 89 MMHG | WEIGHT: 223 LBS

## 2017-10-06 DIAGNOSIS — R09.81 NASAL CONGESTION: ICD-10-CM

## 2017-10-06 DIAGNOSIS — J30.9 ALLERGIC RHINITIS, UNSPECIFIED: ICD-10-CM

## 2017-10-06 DIAGNOSIS — J34.89 OTHER SPECIFIED DISORDERS OF NOSE AND NASAL SINUSES: ICD-10-CM

## 2017-10-06 DIAGNOSIS — Z87.09 PERSONAL HISTORY OF OTHER DISEASES OF THE RESPIRATORY SYSTEM: ICD-10-CM

## 2017-10-06 PROCEDURE — 95165 ANTIGEN THERAPY SERVICES: CPT

## 2017-10-06 PROCEDURE — 95117 IMMUNOTHERAPY INJECTIONS: CPT

## 2017-10-06 PROCEDURE — G0463: CPT | Mod: 25

## 2017-10-06 PROCEDURE — 95165 ANTIGEN THERAPY SERVICES: CPT | Mod: GC,NC

## 2017-10-06 PROCEDURE — 95117 IMMUNOTHERAPY INJECTIONS: CPT | Mod: GC,NC

## 2017-10-06 PROCEDURE — 99213 OFFICE O/P EST LOW 20 MIN: CPT | Mod: 25,GC,NC

## 2017-10-12 DIAGNOSIS — J30.1 ALLERGIC RHINITIS DUE TO POLLEN: ICD-10-CM

## 2017-10-12 DIAGNOSIS — J30.81 ALLERGIC RHINITIS DUE TO ANIMAL (CAT) (DOG) HAIR AND DANDER: ICD-10-CM

## 2017-10-12 DIAGNOSIS — Z51.6 ENCOUNTER FOR DESENSITIZATION TO ALLERGENS: ICD-10-CM

## 2017-10-12 DIAGNOSIS — J06.9 ACUTE UPPER RESPIRATORY INFECTION, UNSPECIFIED: ICD-10-CM

## 2017-10-12 DIAGNOSIS — J30.89 OTHER ALLERGIC RHINITIS: ICD-10-CM

## 2017-10-12 DIAGNOSIS — J34.89 OTHER SPECIFIED DISORDERS OF NOSE AND NASAL SINUSES: ICD-10-CM

## 2017-10-12 DIAGNOSIS — R09.81 NASAL CONGESTION: ICD-10-CM

## 2017-10-27 ENCOUNTER — OUTPATIENT (OUTPATIENT)
Dept: OUTPATIENT SERVICES | Facility: HOSPITAL | Age: 52
LOS: 1 days | End: 2017-10-27
Payer: SELF-PAY

## 2017-10-27 ENCOUNTER — APPOINTMENT (OUTPATIENT)
Dept: PEDIATRIC ALLERGY IMMUNOLOGY | Facility: CLINIC | Age: 52
End: 2017-10-27
Payer: COMMERCIAL

## 2017-10-27 DIAGNOSIS — J30.9 ALLERGIC RHINITIS, UNSPECIFIED: ICD-10-CM

## 2017-10-27 PROCEDURE — 95165 ANTIGEN THERAPY SERVICES: CPT

## 2017-10-27 PROCEDURE — 95165 ANTIGEN THERAPY SERVICES: CPT | Mod: NC

## 2017-10-27 PROCEDURE — 95117 IMMUNOTHERAPY INJECTIONS: CPT | Mod: NC

## 2017-10-27 PROCEDURE — 95117 IMMUNOTHERAPY INJECTIONS: CPT

## 2017-11-02 ENCOUNTER — OUTPATIENT (OUTPATIENT)
Dept: OUTPATIENT SERVICES | Facility: HOSPITAL | Age: 52
LOS: 1 days | End: 2017-11-02
Payer: SELF-PAY

## 2017-11-02 ENCOUNTER — APPOINTMENT (OUTPATIENT)
Dept: OPHTHALMOLOGY | Facility: CLINIC | Age: 52
End: 2017-11-02

## 2017-11-02 ENCOUNTER — APPOINTMENT (OUTPATIENT)
Dept: MAMMOGRAPHY | Facility: IMAGING CENTER | Age: 52
End: 2017-11-02
Payer: COMMERCIAL

## 2017-11-02 ENCOUNTER — APPOINTMENT (OUTPATIENT)
Dept: ULTRASOUND IMAGING | Facility: IMAGING CENTER | Age: 52
End: 2017-11-02
Payer: COMMERCIAL

## 2017-11-02 DIAGNOSIS — E66.9 OBESITY, UNSPECIFIED: ICD-10-CM

## 2017-11-02 DIAGNOSIS — I10 ESSENTIAL (PRIMARY) HYPERTENSION: ICD-10-CM

## 2017-11-02 PROCEDURE — G0202: CPT | Mod: 26

## 2017-11-02 PROCEDURE — 77063 BREAST TOMOSYNTHESIS BI: CPT | Mod: 26

## 2017-11-02 PROCEDURE — 77067 SCR MAMMO BI INCL CAD: CPT

## 2017-11-02 PROCEDURE — 77063 BREAST TOMOSYNTHESIS BI: CPT

## 2017-11-03 ENCOUNTER — APPOINTMENT (OUTPATIENT)
Dept: PEDIATRIC ALLERGY IMMUNOLOGY | Facility: CLINIC | Age: 52
End: 2017-11-03
Payer: COMMERCIAL

## 2017-11-03 ENCOUNTER — OUTPATIENT (OUTPATIENT)
Dept: OUTPATIENT SERVICES | Facility: HOSPITAL | Age: 52
LOS: 1 days | End: 2017-11-03
Payer: SELF-PAY

## 2017-11-03 DIAGNOSIS — J30.9 ALLERGIC RHINITIS, UNSPECIFIED: ICD-10-CM

## 2017-11-03 DIAGNOSIS — J30.81 ALLERGIC RHINITIS DUE TO ANIMAL (CAT) (DOG) HAIR AND DANDER: ICD-10-CM

## 2017-11-03 DIAGNOSIS — J30.89 OTHER ALLERGIC RHINITIS: ICD-10-CM

## 2017-11-03 DIAGNOSIS — Z51.6 ENCOUNTER FOR DESENSITIZATION TO ALLERGENS: ICD-10-CM

## 2017-11-03 DIAGNOSIS — J30.1 ALLERGIC RHINITIS DUE TO POLLEN: ICD-10-CM

## 2017-11-03 PROCEDURE — 95165 ANTIGEN THERAPY SERVICES: CPT | Mod: NC

## 2017-11-03 PROCEDURE — 95117 IMMUNOTHERAPY INJECTIONS: CPT

## 2017-11-03 PROCEDURE — 95165 ANTIGEN THERAPY SERVICES: CPT

## 2017-11-03 PROCEDURE — 82274 ASSAY TEST FOR BLOOD FECAL: CPT

## 2017-11-03 PROCEDURE — 95117 IMMUNOTHERAPY INJECTIONS: CPT | Mod: NC

## 2017-11-06 DIAGNOSIS — Z12.31 ENCOUNTER FOR SCREENING MAMMOGRAM FOR MALIGNANT NEOPLASM OF BREAST: ICD-10-CM

## 2017-11-10 ENCOUNTER — OUTPATIENT (OUTPATIENT)
Dept: OUTPATIENT SERVICES | Facility: HOSPITAL | Age: 52
LOS: 1 days | End: 2017-11-10
Payer: SELF-PAY

## 2017-11-10 ENCOUNTER — APPOINTMENT (OUTPATIENT)
Dept: PEDIATRIC ALLERGY IMMUNOLOGY | Facility: CLINIC | Age: 52
End: 2017-11-10
Payer: COMMERCIAL

## 2017-11-10 DIAGNOSIS — J30.9 ALLERGIC RHINITIS, UNSPECIFIED: ICD-10-CM

## 2017-11-10 DIAGNOSIS — J30.1 ALLERGIC RHINITIS DUE TO POLLEN: ICD-10-CM

## 2017-11-10 DIAGNOSIS — Z51.6 ENCOUNTER FOR DESENSITIZATION TO ALLERGENS: ICD-10-CM

## 2017-11-10 DIAGNOSIS — J30.81 ALLERGIC RHINITIS DUE TO ANIMAL (CAT) (DOG) HAIR AND DANDER: ICD-10-CM

## 2017-11-10 DIAGNOSIS — J30.89 OTHER ALLERGIC RHINITIS: ICD-10-CM

## 2017-11-10 PROCEDURE — 95117 IMMUNOTHERAPY INJECTIONS: CPT | Mod: NC

## 2017-11-10 PROCEDURE — 95165 ANTIGEN THERAPY SERVICES: CPT | Mod: NC

## 2017-11-10 PROCEDURE — 95117 IMMUNOTHERAPY INJECTIONS: CPT

## 2017-11-10 PROCEDURE — 95165 ANTIGEN THERAPY SERVICES: CPT

## 2017-11-13 DIAGNOSIS — J30.89 OTHER ALLERGIC RHINITIS: ICD-10-CM

## 2017-11-13 DIAGNOSIS — Z51.6 ENCOUNTER FOR DESENSITIZATION TO ALLERGENS: ICD-10-CM

## 2017-11-13 DIAGNOSIS — J30.1 ALLERGIC RHINITIS DUE TO POLLEN: ICD-10-CM

## 2017-11-13 DIAGNOSIS — J30.81 ALLERGIC RHINITIS DUE TO ANIMAL (CAT) (DOG) HAIR AND DANDER: ICD-10-CM

## 2017-11-14 ENCOUNTER — OUTPATIENT (OUTPATIENT)
Dept: OUTPATIENT SERVICES | Facility: HOSPITAL | Age: 52
LOS: 1 days | End: 2017-11-14
Payer: SELF-PAY

## 2017-11-14 ENCOUNTER — APPOINTMENT (OUTPATIENT)
Dept: GASTROENTEROLOGY | Facility: HOSPITAL | Age: 52
End: 2017-11-14

## 2017-11-14 VITALS
HEART RATE: 70 BPM | HEIGHT: 67 IN | BODY MASS INDEX: 34.53 KG/M2 | DIASTOLIC BLOOD PRESSURE: 104 MMHG | SYSTOLIC BLOOD PRESSURE: 160 MMHG | RESPIRATION RATE: 14 BRPM | WEIGHT: 220 LBS

## 2017-11-14 DIAGNOSIS — K59.00 CONSTIPATION, UNSPECIFIED: ICD-10-CM

## 2017-11-14 DIAGNOSIS — J30.89 OTHER ALLERGIC RHINITIS: ICD-10-CM

## 2017-11-14 DIAGNOSIS — J30.1 ALLERGIC RHINITIS DUE TO POLLEN: ICD-10-CM

## 2017-11-14 DIAGNOSIS — J30.81 ALLERGIC RHINITIS DUE TO ANIMAL (CAT) (DOG) HAIR AND DANDER: ICD-10-CM

## 2017-11-14 DIAGNOSIS — K31.9 DISEASE OF STOMACH AND DUODENUM, UNSPECIFIED: ICD-10-CM

## 2017-11-14 DIAGNOSIS — Z51.6 ENCOUNTER FOR DESENSITIZATION TO ALLERGENS: ICD-10-CM

## 2017-11-14 DIAGNOSIS — Z80.0 FAMILY HISTORY OF MALIGNANT NEOPLASM OF DIGESTIVE ORGANS: ICD-10-CM

## 2017-11-14 PROCEDURE — G0463: CPT

## 2017-11-17 ENCOUNTER — APPOINTMENT (OUTPATIENT)
Dept: PEDIATRIC ALLERGY IMMUNOLOGY | Facility: CLINIC | Age: 52
End: 2017-11-17
Payer: COMMERCIAL

## 2017-11-17 ENCOUNTER — OUTPATIENT (OUTPATIENT)
Dept: OUTPATIENT SERVICES | Facility: HOSPITAL | Age: 52
LOS: 1 days | End: 2017-11-17
Payer: SELF-PAY

## 2017-11-17 ENCOUNTER — APPOINTMENT (OUTPATIENT)
Dept: OBGYN | Facility: CLINIC | Age: 52
End: 2017-11-17
Payer: COMMERCIAL

## 2017-11-17 ENCOUNTER — LABORATORY RESULT (OUTPATIENT)
Age: 52
End: 2017-11-17

## 2017-11-17 VITALS
HEIGHT: 67 IN | BODY MASS INDEX: 34.53 KG/M2 | SYSTOLIC BLOOD PRESSURE: 110 MMHG | WEIGHT: 220 LBS | DIASTOLIC BLOOD PRESSURE: 80 MMHG

## 2017-11-17 DIAGNOSIS — J30.9 ALLERGIC RHINITIS, UNSPECIFIED: ICD-10-CM

## 2017-11-17 PROCEDURE — 87624 HPV HI-RISK TYP POOLED RSLT: CPT

## 2017-11-17 PROCEDURE — 95117 IMMUNOTHERAPY INJECTIONS: CPT

## 2017-11-17 PROCEDURE — 95117 IMMUNOTHERAPY INJECTIONS: CPT | Mod: NC

## 2017-11-17 PROCEDURE — 95165 ANTIGEN THERAPY SERVICES: CPT

## 2017-11-17 PROCEDURE — 95165 ANTIGEN THERAPY SERVICES: CPT | Mod: NC

## 2017-11-18 LAB — HPV HIGH+LOW RISK DNA PNL CVX: SIGNIFICANT CHANGE UP

## 2017-11-20 DIAGNOSIS — J30.89 OTHER ALLERGIC RHINITIS: ICD-10-CM

## 2017-11-20 DIAGNOSIS — J30.1 ALLERGIC RHINITIS DUE TO POLLEN: ICD-10-CM

## 2017-11-20 DIAGNOSIS — Z51.6 ENCOUNTER FOR DESENSITIZATION TO ALLERGENS: ICD-10-CM

## 2017-11-20 DIAGNOSIS — J30.81 ALLERGIC RHINITIS DUE TO ANIMAL (CAT) (DOG) HAIR AND DANDER: ICD-10-CM

## 2017-12-01 ENCOUNTER — APPOINTMENT (OUTPATIENT)
Dept: PEDIATRIC ALLERGY IMMUNOLOGY | Facility: CLINIC | Age: 52
End: 2017-12-01
Payer: COMMERCIAL

## 2017-12-01 ENCOUNTER — OUTPATIENT (OUTPATIENT)
Dept: OUTPATIENT SERVICES | Facility: HOSPITAL | Age: 52
LOS: 1 days | End: 2017-12-01
Payer: SELF-PAY

## 2017-12-01 DIAGNOSIS — J30.9 ALLERGIC RHINITIS, UNSPECIFIED: ICD-10-CM

## 2017-12-01 PROCEDURE — 95165 ANTIGEN THERAPY SERVICES: CPT

## 2017-12-01 PROCEDURE — 95117 IMMUNOTHERAPY INJECTIONS: CPT | Mod: NC

## 2017-12-01 PROCEDURE — 95117 IMMUNOTHERAPY INJECTIONS: CPT

## 2017-12-01 PROCEDURE — 95165 ANTIGEN THERAPY SERVICES: CPT | Mod: NC

## 2017-12-04 ENCOUNTER — APPOINTMENT (OUTPATIENT)
Dept: OPHTHALMOLOGY | Facility: CLINIC | Age: 52
End: 2017-12-04

## 2017-12-05 DIAGNOSIS — Z51.6 ENCOUNTER FOR DESENSITIZATION TO ALLERGENS: ICD-10-CM

## 2017-12-05 DIAGNOSIS — J30.89 OTHER ALLERGIC RHINITIS: ICD-10-CM

## 2017-12-05 DIAGNOSIS — J30.1 ALLERGIC RHINITIS DUE TO POLLEN: ICD-10-CM

## 2017-12-05 DIAGNOSIS — J30.81 ALLERGIC RHINITIS DUE TO ANIMAL (CAT) (DOG) HAIR AND DANDER: ICD-10-CM

## 2017-12-07 ENCOUNTER — APPOINTMENT (OUTPATIENT)
Dept: OPHTHALMOLOGY | Facility: CLINIC | Age: 52
End: 2017-12-07

## 2017-12-08 ENCOUNTER — APPOINTMENT (OUTPATIENT)
Dept: PEDIATRIC ALLERGY IMMUNOLOGY | Facility: CLINIC | Age: 52
End: 2017-12-08
Payer: COMMERCIAL

## 2017-12-08 ENCOUNTER — OUTPATIENT (OUTPATIENT)
Dept: OUTPATIENT SERVICES | Facility: HOSPITAL | Age: 52
LOS: 1 days | End: 2017-12-08
Payer: SELF-PAY

## 2017-12-08 DIAGNOSIS — J30.9 ALLERGIC RHINITIS, UNSPECIFIED: ICD-10-CM

## 2017-12-08 PROCEDURE — 95165 ANTIGEN THERAPY SERVICES: CPT | Mod: NC

## 2017-12-08 PROCEDURE — 95165 ANTIGEN THERAPY SERVICES: CPT

## 2017-12-08 PROCEDURE — 95117 IMMUNOTHERAPY INJECTIONS: CPT | Mod: NC

## 2017-12-08 PROCEDURE — 95117 IMMUNOTHERAPY INJECTIONS: CPT

## 2017-12-11 DIAGNOSIS — J30.89 OTHER ALLERGIC RHINITIS: ICD-10-CM

## 2017-12-11 DIAGNOSIS — J30.81 ALLERGIC RHINITIS DUE TO ANIMAL (CAT) (DOG) HAIR AND DANDER: ICD-10-CM

## 2017-12-11 DIAGNOSIS — J30.1 ALLERGIC RHINITIS DUE TO POLLEN: ICD-10-CM

## 2017-12-11 DIAGNOSIS — Z51.6 ENCOUNTER FOR DESENSITIZATION TO ALLERGENS: ICD-10-CM

## 2017-12-15 ENCOUNTER — APPOINTMENT (OUTPATIENT)
Dept: PEDIATRIC ALLERGY IMMUNOLOGY | Facility: CLINIC | Age: 52
End: 2017-12-15
Payer: COMMERCIAL

## 2017-12-15 ENCOUNTER — OUTPATIENT (OUTPATIENT)
Dept: OUTPATIENT SERVICES | Facility: HOSPITAL | Age: 52
LOS: 1 days | End: 2017-12-15
Payer: SELF-PAY

## 2017-12-15 DIAGNOSIS — J30.9 ALLERGIC RHINITIS, UNSPECIFIED: ICD-10-CM

## 2017-12-15 PROCEDURE — 95165 ANTIGEN THERAPY SERVICES: CPT

## 2017-12-15 PROCEDURE — 95165 ANTIGEN THERAPY SERVICES: CPT | Mod: NC

## 2017-12-15 PROCEDURE — 95117 IMMUNOTHERAPY INJECTIONS: CPT | Mod: NC

## 2017-12-15 PROCEDURE — 95117 IMMUNOTHERAPY INJECTIONS: CPT

## 2017-12-21 DIAGNOSIS — Z51.6 ENCOUNTER FOR DESENSITIZATION TO ALLERGENS: ICD-10-CM

## 2017-12-21 DIAGNOSIS — J30.81 ALLERGIC RHINITIS DUE TO ANIMAL (CAT) (DOG) HAIR AND DANDER: ICD-10-CM

## 2017-12-21 DIAGNOSIS — J30.89 OTHER ALLERGIC RHINITIS: ICD-10-CM

## 2017-12-21 DIAGNOSIS — J30.1 ALLERGIC RHINITIS DUE TO POLLEN: ICD-10-CM

## 2017-12-22 ENCOUNTER — APPOINTMENT (OUTPATIENT)
Dept: PEDIATRIC ALLERGY IMMUNOLOGY | Facility: CLINIC | Age: 52
End: 2017-12-22
Payer: COMMERCIAL

## 2017-12-22 ENCOUNTER — OUTPATIENT (OUTPATIENT)
Dept: OUTPATIENT SERVICES | Facility: HOSPITAL | Age: 52
LOS: 1 days | End: 2017-12-22
Payer: SELF-PAY

## 2017-12-22 DIAGNOSIS — J30.9 ALLERGIC RHINITIS, UNSPECIFIED: ICD-10-CM

## 2017-12-22 PROCEDURE — 95117 IMMUNOTHERAPY INJECTIONS: CPT

## 2017-12-22 PROCEDURE — 95165 ANTIGEN THERAPY SERVICES: CPT

## 2017-12-22 PROCEDURE — 95117 IMMUNOTHERAPY INJECTIONS: CPT | Mod: NC

## 2017-12-22 PROCEDURE — 95165 ANTIGEN THERAPY SERVICES: CPT | Mod: NC

## 2017-12-26 DIAGNOSIS — Z51.6 ENCOUNTER FOR DESENSITIZATION TO ALLERGENS: ICD-10-CM

## 2017-12-26 DIAGNOSIS — J30.1 ALLERGIC RHINITIS DUE TO POLLEN: ICD-10-CM

## 2017-12-26 DIAGNOSIS — J30.81 ALLERGIC RHINITIS DUE TO ANIMAL (CAT) (DOG) HAIR AND DANDER: ICD-10-CM

## 2017-12-26 DIAGNOSIS — J30.89 OTHER ALLERGIC RHINITIS: ICD-10-CM

## 2017-12-29 ENCOUNTER — APPOINTMENT (OUTPATIENT)
Dept: PEDIATRIC ALLERGY IMMUNOLOGY | Facility: CLINIC | Age: 52
End: 2017-12-29

## 2018-01-12 ENCOUNTER — APPOINTMENT (OUTPATIENT)
Dept: PEDIATRIC ALLERGY IMMUNOLOGY | Facility: CLINIC | Age: 53
End: 2018-01-12
Payer: COMMERCIAL

## 2018-01-12 ENCOUNTER — OUTPATIENT (OUTPATIENT)
Dept: OUTPATIENT SERVICES | Facility: HOSPITAL | Age: 53
LOS: 1 days | End: 2018-01-12
Payer: SELF-PAY

## 2018-01-12 DIAGNOSIS — J30.9 ALLERGIC RHINITIS, UNSPECIFIED: ICD-10-CM

## 2018-01-12 PROCEDURE — 95165 ANTIGEN THERAPY SERVICES: CPT | Mod: NC

## 2018-01-12 PROCEDURE — 95117 IMMUNOTHERAPY INJECTIONS: CPT

## 2018-01-12 PROCEDURE — 95165 ANTIGEN THERAPY SERVICES: CPT

## 2018-01-12 PROCEDURE — 95117 IMMUNOTHERAPY INJECTIONS: CPT | Mod: NC

## 2018-01-16 DIAGNOSIS — J30.81 ALLERGIC RHINITIS DUE TO ANIMAL (CAT) (DOG) HAIR AND DANDER: ICD-10-CM

## 2018-01-16 DIAGNOSIS — J30.1 ALLERGIC RHINITIS DUE TO POLLEN: ICD-10-CM

## 2018-01-16 DIAGNOSIS — J30.89 OTHER ALLERGIC RHINITIS: ICD-10-CM

## 2018-01-16 DIAGNOSIS — Z51.6 ENCOUNTER FOR DESENSITIZATION TO ALLERGENS: ICD-10-CM

## 2018-01-19 ENCOUNTER — OUTPATIENT (OUTPATIENT)
Dept: OUTPATIENT SERVICES | Facility: HOSPITAL | Age: 53
LOS: 1 days | End: 2018-01-19
Payer: SELF-PAY

## 2018-01-19 ENCOUNTER — APPOINTMENT (OUTPATIENT)
Dept: PEDIATRIC ALLERGY IMMUNOLOGY | Facility: CLINIC | Age: 53
End: 2018-01-19
Payer: COMMERCIAL

## 2018-01-19 DIAGNOSIS — J30.9 ALLERGIC RHINITIS, UNSPECIFIED: ICD-10-CM

## 2018-01-19 PROCEDURE — 95117 IMMUNOTHERAPY INJECTIONS: CPT

## 2018-01-19 PROCEDURE — 95165 ANTIGEN THERAPY SERVICES: CPT | Mod: NC

## 2018-01-19 PROCEDURE — 95117 IMMUNOTHERAPY INJECTIONS: CPT | Mod: NC

## 2018-01-19 PROCEDURE — 95165 ANTIGEN THERAPY SERVICES: CPT

## 2018-01-23 DIAGNOSIS — J30.81 ALLERGIC RHINITIS DUE TO ANIMAL (CAT) (DOG) HAIR AND DANDER: ICD-10-CM

## 2018-01-23 DIAGNOSIS — J30.1 ALLERGIC RHINITIS DUE TO POLLEN: ICD-10-CM

## 2018-01-23 DIAGNOSIS — Z51.6 ENCOUNTER FOR DESENSITIZATION TO ALLERGENS: ICD-10-CM

## 2018-01-23 DIAGNOSIS — J30.89 OTHER ALLERGIC RHINITIS: ICD-10-CM

## 2018-01-26 ENCOUNTER — OUTPATIENT (OUTPATIENT)
Dept: OUTPATIENT SERVICES | Facility: HOSPITAL | Age: 53
LOS: 1 days | End: 2018-01-26
Payer: SELF-PAY

## 2018-01-26 ENCOUNTER — APPOINTMENT (OUTPATIENT)
Dept: PEDIATRIC ALLERGY IMMUNOLOGY | Facility: CLINIC | Age: 53
End: 2018-01-26
Payer: COMMERCIAL

## 2018-01-26 DIAGNOSIS — J30.9 ALLERGIC RHINITIS, UNSPECIFIED: ICD-10-CM

## 2018-01-26 PROCEDURE — 95165 ANTIGEN THERAPY SERVICES: CPT | Mod: NC

## 2018-01-26 PROCEDURE — 95165 ANTIGEN THERAPY SERVICES: CPT

## 2018-01-26 PROCEDURE — 95117 IMMUNOTHERAPY INJECTIONS: CPT

## 2018-01-26 PROCEDURE — 95117 IMMUNOTHERAPY INJECTIONS: CPT | Mod: NC

## 2018-02-09 ENCOUNTER — APPOINTMENT (OUTPATIENT)
Dept: PEDIATRIC ALLERGY IMMUNOLOGY | Facility: CLINIC | Age: 53
End: 2018-02-09
Payer: COMMERCIAL

## 2018-02-09 ENCOUNTER — OUTPATIENT (OUTPATIENT)
Dept: OUTPATIENT SERVICES | Facility: HOSPITAL | Age: 53
LOS: 1 days | End: 2018-02-09
Payer: SELF-PAY

## 2018-02-09 ENCOUNTER — APPOINTMENT (OUTPATIENT)
Dept: INTERNAL MEDICINE | Facility: CLINIC | Age: 53
End: 2018-02-09

## 2018-02-09 DIAGNOSIS — J30.89 OTHER ALLERGIC RHINITIS: ICD-10-CM

## 2018-02-09 DIAGNOSIS — J30.1 ALLERGIC RHINITIS DUE TO POLLEN: ICD-10-CM

## 2018-02-09 DIAGNOSIS — Z51.6 ENCOUNTER FOR DESENSITIZATION TO ALLERGENS: ICD-10-CM

## 2018-02-09 DIAGNOSIS — E66.9 OBESITY, UNSPECIFIED: ICD-10-CM

## 2018-02-09 DIAGNOSIS — J30.81 ALLERGIC RHINITIS DUE TO ANIMAL (CAT) (DOG) HAIR AND DANDER: ICD-10-CM

## 2018-02-09 DIAGNOSIS — Z79.01 LONG TERM (CURRENT) USE OF ANTICOAGULANTS: ICD-10-CM

## 2018-02-09 DIAGNOSIS — I10 ESSENTIAL (PRIMARY) HYPERTENSION: ICD-10-CM

## 2018-02-09 DIAGNOSIS — J30.9 ALLERGIC RHINITIS, UNSPECIFIED: ICD-10-CM

## 2018-02-09 PROCEDURE — 85610 PROTHROMBIN TIME: CPT

## 2018-02-09 PROCEDURE — 95117 IMMUNOTHERAPY INJECTIONS: CPT | Mod: NC

## 2018-02-09 PROCEDURE — 95117 IMMUNOTHERAPY INJECTIONS: CPT

## 2018-02-09 PROCEDURE — 95165 ANTIGEN THERAPY SERVICES: CPT | Mod: NC

## 2018-02-09 PROCEDURE — 95165 ANTIGEN THERAPY SERVICES: CPT

## 2018-02-12 DIAGNOSIS — J30.1 ALLERGIC RHINITIS DUE TO POLLEN: ICD-10-CM

## 2018-02-12 DIAGNOSIS — Z51.6 ENCOUNTER FOR DESENSITIZATION TO ALLERGENS: ICD-10-CM

## 2018-02-12 DIAGNOSIS — J30.89 OTHER ALLERGIC RHINITIS: ICD-10-CM

## 2018-02-12 DIAGNOSIS — J30.81 ALLERGIC RHINITIS DUE TO ANIMAL (CAT) (DOG) HAIR AND DANDER: ICD-10-CM

## 2018-02-16 ENCOUNTER — OUTPATIENT (OUTPATIENT)
Dept: OUTPATIENT SERVICES | Facility: HOSPITAL | Age: 53
LOS: 1 days | End: 2018-02-16
Payer: SELF-PAY

## 2018-02-16 ENCOUNTER — APPOINTMENT (OUTPATIENT)
Dept: PEDIATRIC ALLERGY IMMUNOLOGY | Facility: CLINIC | Age: 53
End: 2018-02-16
Payer: COMMERCIAL

## 2018-02-16 DIAGNOSIS — J30.9 ALLERGIC RHINITIS, UNSPECIFIED: ICD-10-CM

## 2018-02-16 PROCEDURE — 95165 ANTIGEN THERAPY SERVICES: CPT | Mod: NC

## 2018-02-16 PROCEDURE — 95117 IMMUNOTHERAPY INJECTIONS: CPT | Mod: NC

## 2018-02-16 PROCEDURE — 95165 ANTIGEN THERAPY SERVICES: CPT

## 2018-02-16 PROCEDURE — 95117 IMMUNOTHERAPY INJECTIONS: CPT

## 2018-02-22 DIAGNOSIS — J30.89 OTHER ALLERGIC RHINITIS: ICD-10-CM

## 2018-02-22 DIAGNOSIS — Z51.6 ENCOUNTER FOR DESENSITIZATION TO ALLERGENS: ICD-10-CM

## 2018-02-22 DIAGNOSIS — J30.81 ALLERGIC RHINITIS DUE TO ANIMAL (CAT) (DOG) HAIR AND DANDER: ICD-10-CM

## 2018-02-22 DIAGNOSIS — J30.1 ALLERGIC RHINITIS DUE TO POLLEN: ICD-10-CM

## 2018-02-23 ENCOUNTER — APPOINTMENT (OUTPATIENT)
Dept: PEDIATRIC ALLERGY IMMUNOLOGY | Facility: CLINIC | Age: 53
End: 2018-02-23
Payer: COMMERCIAL

## 2018-02-23 ENCOUNTER — OUTPATIENT (OUTPATIENT)
Dept: OUTPATIENT SERVICES | Facility: HOSPITAL | Age: 53
LOS: 1 days | End: 2018-02-23
Payer: SELF-PAY

## 2018-02-23 DIAGNOSIS — J30.9 ALLERGIC RHINITIS, UNSPECIFIED: ICD-10-CM

## 2018-02-23 PROCEDURE — 95117 IMMUNOTHERAPY INJECTIONS: CPT | Mod: NC

## 2018-02-23 PROCEDURE — 95165 ANTIGEN THERAPY SERVICES: CPT | Mod: NC

## 2018-02-23 PROCEDURE — 95165 ANTIGEN THERAPY SERVICES: CPT

## 2018-02-23 PROCEDURE — 95117 IMMUNOTHERAPY INJECTIONS: CPT

## 2018-02-27 DIAGNOSIS — Z51.6 ENCOUNTER FOR DESENSITIZATION TO ALLERGENS: ICD-10-CM

## 2018-02-27 DIAGNOSIS — J30.1 ALLERGIC RHINITIS DUE TO POLLEN: ICD-10-CM

## 2018-02-27 DIAGNOSIS — J30.89 OTHER ALLERGIC RHINITIS: ICD-10-CM

## 2018-02-27 DIAGNOSIS — J30.81 ALLERGIC RHINITIS DUE TO ANIMAL (CAT) (DOG) HAIR AND DANDER: ICD-10-CM

## 2018-03-09 ENCOUNTER — OUTPATIENT (OUTPATIENT)
Dept: OUTPATIENT SERVICES | Facility: HOSPITAL | Age: 53
LOS: 1 days | End: 2018-03-09
Payer: SELF-PAY

## 2018-03-09 ENCOUNTER — APPOINTMENT (OUTPATIENT)
Dept: PEDIATRIC ALLERGY IMMUNOLOGY | Facility: CLINIC | Age: 53
End: 2018-03-09
Payer: COMMERCIAL

## 2018-03-09 DIAGNOSIS — J30.9 ALLERGIC RHINITIS, UNSPECIFIED: ICD-10-CM

## 2018-03-09 PROCEDURE — 95165 ANTIGEN THERAPY SERVICES: CPT | Mod: NC

## 2018-03-09 PROCEDURE — 95117 IMMUNOTHERAPY INJECTIONS: CPT | Mod: NC

## 2018-03-09 PROCEDURE — 95165 ANTIGEN THERAPY SERVICES: CPT

## 2018-03-09 PROCEDURE — 95117 IMMUNOTHERAPY INJECTIONS: CPT

## 2018-03-15 DIAGNOSIS — J30.89 OTHER ALLERGIC RHINITIS: ICD-10-CM

## 2018-03-15 DIAGNOSIS — Z51.6 ENCOUNTER FOR DESENSITIZATION TO ALLERGENS: ICD-10-CM

## 2018-03-15 DIAGNOSIS — J30.81 ALLERGIC RHINITIS DUE TO ANIMAL (CAT) (DOG) HAIR AND DANDER: ICD-10-CM

## 2018-03-15 DIAGNOSIS — J30.1 ALLERGIC RHINITIS DUE TO POLLEN: ICD-10-CM

## 2018-03-16 ENCOUNTER — APPOINTMENT (OUTPATIENT)
Dept: PEDIATRIC ALLERGY IMMUNOLOGY | Facility: CLINIC | Age: 53
End: 2018-03-16
Payer: COMMERCIAL

## 2018-03-16 ENCOUNTER — OUTPATIENT (OUTPATIENT)
Dept: OUTPATIENT SERVICES | Facility: HOSPITAL | Age: 53
LOS: 1 days | End: 2018-03-16
Payer: SELF-PAY

## 2018-03-16 PROCEDURE — 95165 ANTIGEN THERAPY SERVICES: CPT | Mod: NC

## 2018-03-16 PROCEDURE — 95117 IMMUNOTHERAPY INJECTIONS: CPT | Mod: NC

## 2018-03-16 PROCEDURE — 95117 IMMUNOTHERAPY INJECTIONS: CPT

## 2018-03-16 PROCEDURE — 95165 ANTIGEN THERAPY SERVICES: CPT

## 2018-03-20 ENCOUNTER — MEDICATION RENEWAL (OUTPATIENT)
Age: 53
End: 2018-03-20

## 2018-03-23 ENCOUNTER — APPOINTMENT (OUTPATIENT)
Dept: PEDIATRIC ALLERGY IMMUNOLOGY | Facility: CLINIC | Age: 53
End: 2018-03-23

## 2018-03-27 DIAGNOSIS — J30.1 ALLERGIC RHINITIS DUE TO POLLEN: ICD-10-CM

## 2018-03-27 DIAGNOSIS — J30.81 ALLERGIC RHINITIS DUE TO ANIMAL (CAT) (DOG) HAIR AND DANDER: ICD-10-CM

## 2018-03-27 DIAGNOSIS — Z51.6 ENCOUNTER FOR DESENSITIZATION TO ALLERGENS: ICD-10-CM

## 2018-03-27 DIAGNOSIS — J30.89 OTHER ALLERGIC RHINITIS: ICD-10-CM

## 2018-03-30 ENCOUNTER — APPOINTMENT (OUTPATIENT)
Dept: PEDIATRIC ALLERGY IMMUNOLOGY | Facility: CLINIC | Age: 53
End: 2018-03-30

## 2018-05-04 ENCOUNTER — OUTPATIENT (OUTPATIENT)
Dept: OUTPATIENT SERVICES | Facility: HOSPITAL | Age: 53
LOS: 1 days | End: 2018-05-04
Payer: SELF-PAY

## 2018-05-04 ENCOUNTER — APPOINTMENT (OUTPATIENT)
Dept: PEDIATRIC ALLERGY IMMUNOLOGY | Facility: CLINIC | Age: 53
End: 2018-05-04
Payer: COMMERCIAL

## 2018-05-04 DIAGNOSIS — J30.9 ALLERGIC RHINITIS, UNSPECIFIED: ICD-10-CM

## 2018-05-04 PROCEDURE — 95165 ANTIGEN THERAPY SERVICES: CPT | Mod: NC

## 2018-05-04 PROCEDURE — 95165 ANTIGEN THERAPY SERVICES: CPT

## 2018-05-04 PROCEDURE — 95117 IMMUNOTHERAPY INJECTIONS: CPT

## 2018-05-04 PROCEDURE — 95117 IMMUNOTHERAPY INJECTIONS: CPT | Mod: NC

## 2018-05-08 DIAGNOSIS — J30.81 ALLERGIC RHINITIS DUE TO ANIMAL (CAT) (DOG) HAIR AND DANDER: ICD-10-CM

## 2018-05-08 DIAGNOSIS — Z51.6 ENCOUNTER FOR DESENSITIZATION TO ALLERGENS: ICD-10-CM

## 2018-05-08 DIAGNOSIS — J30.89 OTHER ALLERGIC RHINITIS: ICD-10-CM

## 2018-05-08 DIAGNOSIS — J30.1 ALLERGIC RHINITIS DUE TO POLLEN: ICD-10-CM

## 2018-05-11 ENCOUNTER — APPOINTMENT (OUTPATIENT)
Dept: PEDIATRIC ALLERGY IMMUNOLOGY | Facility: CLINIC | Age: 53
End: 2018-05-11
Payer: COMMERCIAL

## 2018-05-11 ENCOUNTER — OUTPATIENT (OUTPATIENT)
Dept: OUTPATIENT SERVICES | Facility: HOSPITAL | Age: 53
LOS: 1 days | End: 2018-05-11
Payer: SELF-PAY

## 2018-05-11 DIAGNOSIS — J30.9 ALLERGIC RHINITIS, UNSPECIFIED: ICD-10-CM

## 2018-05-11 PROCEDURE — 95117 IMMUNOTHERAPY INJECTIONS: CPT | Mod: NC

## 2018-05-11 PROCEDURE — 95165 ANTIGEN THERAPY SERVICES: CPT | Mod: NC

## 2018-05-11 PROCEDURE — 95165 ANTIGEN THERAPY SERVICES: CPT

## 2018-05-11 PROCEDURE — 95117 IMMUNOTHERAPY INJECTIONS: CPT

## 2018-05-15 ENCOUNTER — RX RENEWAL (OUTPATIENT)
Age: 53
End: 2018-05-15

## 2018-05-15 DIAGNOSIS — J30.81 ALLERGIC RHINITIS DUE TO ANIMAL (CAT) (DOG) HAIR AND DANDER: ICD-10-CM

## 2018-05-15 DIAGNOSIS — Z51.6 ENCOUNTER FOR DESENSITIZATION TO ALLERGENS: ICD-10-CM

## 2018-05-15 DIAGNOSIS — J30.89 OTHER ALLERGIC RHINITIS: ICD-10-CM

## 2018-05-15 DIAGNOSIS — J30.1 ALLERGIC RHINITIS DUE TO POLLEN: ICD-10-CM

## 2018-05-18 ENCOUNTER — OUTPATIENT (OUTPATIENT)
Dept: OUTPATIENT SERVICES | Facility: HOSPITAL | Age: 53
LOS: 1 days | End: 2018-05-18
Payer: SELF-PAY

## 2018-05-18 ENCOUNTER — APPOINTMENT (OUTPATIENT)
Dept: PEDIATRIC ALLERGY IMMUNOLOGY | Facility: CLINIC | Age: 53
End: 2018-05-18
Payer: COMMERCIAL

## 2018-05-18 DIAGNOSIS — J30.9 ALLERGIC RHINITIS, UNSPECIFIED: ICD-10-CM

## 2018-05-18 PROCEDURE — 95165 ANTIGEN THERAPY SERVICES: CPT

## 2018-05-18 PROCEDURE — 95165 ANTIGEN THERAPY SERVICES: CPT | Mod: NC

## 2018-05-18 PROCEDURE — 95117 IMMUNOTHERAPY INJECTIONS: CPT | Mod: NC

## 2018-05-18 PROCEDURE — 95117 IMMUNOTHERAPY INJECTIONS: CPT

## 2018-05-22 DIAGNOSIS — J30.89 OTHER ALLERGIC RHINITIS: ICD-10-CM

## 2018-05-22 DIAGNOSIS — J30.1 ALLERGIC RHINITIS DUE TO POLLEN: ICD-10-CM

## 2018-05-22 DIAGNOSIS — Z51.6 ENCOUNTER FOR DESENSITIZATION TO ALLERGENS: ICD-10-CM

## 2018-05-22 DIAGNOSIS — J30.81 ALLERGIC RHINITIS DUE TO ANIMAL (CAT) (DOG) HAIR AND DANDER: ICD-10-CM

## 2018-05-25 ENCOUNTER — OUTPATIENT (OUTPATIENT)
Dept: OUTPATIENT SERVICES | Facility: HOSPITAL | Age: 53
LOS: 1 days | End: 2018-05-25
Payer: SELF-PAY

## 2018-05-25 ENCOUNTER — APPOINTMENT (OUTPATIENT)
Dept: PEDIATRIC ALLERGY IMMUNOLOGY | Facility: CLINIC | Age: 53
End: 2018-05-25
Payer: COMMERCIAL

## 2018-05-25 DIAGNOSIS — J30.9 ALLERGIC RHINITIS, UNSPECIFIED: ICD-10-CM

## 2018-05-25 PROCEDURE — 99213 OFFICE O/P EST LOW 20 MIN: CPT | Mod: GC,NC

## 2018-05-25 PROCEDURE — G0463: CPT

## 2018-05-30 DIAGNOSIS — J34.89 OTHER SPECIFIED DISORDERS OF NOSE AND NASAL SINUSES: ICD-10-CM

## 2018-05-30 DIAGNOSIS — J01.90 ACUTE SINUSITIS, UNSPECIFIED: ICD-10-CM

## 2018-06-01 ENCOUNTER — OUTPATIENT (OUTPATIENT)
Dept: OUTPATIENT SERVICES | Facility: HOSPITAL | Age: 53
LOS: 1 days | End: 2018-06-01
Payer: SELF-PAY

## 2018-06-01 ENCOUNTER — APPOINTMENT (OUTPATIENT)
Dept: PEDIATRIC ALLERGY IMMUNOLOGY | Facility: CLINIC | Age: 53
End: 2018-06-01
Payer: COMMERCIAL

## 2018-06-01 DIAGNOSIS — J30.9 ALLERGIC RHINITIS, UNSPECIFIED: ICD-10-CM

## 2018-06-01 PROCEDURE — 95165 ANTIGEN THERAPY SERVICES: CPT | Mod: NC

## 2018-06-01 PROCEDURE — 95117 IMMUNOTHERAPY INJECTIONS: CPT

## 2018-06-01 PROCEDURE — 95117 IMMUNOTHERAPY INJECTIONS: CPT | Mod: NC

## 2018-06-01 PROCEDURE — 95165 ANTIGEN THERAPY SERVICES: CPT

## 2018-06-04 DIAGNOSIS — Z51.6 ENCOUNTER FOR DESENSITIZATION TO ALLERGENS: ICD-10-CM

## 2018-06-04 DIAGNOSIS — J30.81 ALLERGIC RHINITIS DUE TO ANIMAL (CAT) (DOG) HAIR AND DANDER: ICD-10-CM

## 2018-06-04 DIAGNOSIS — J30.1 ALLERGIC RHINITIS DUE TO POLLEN: ICD-10-CM

## 2018-06-04 DIAGNOSIS — J30.89 OTHER ALLERGIC RHINITIS: ICD-10-CM

## 2018-06-15 ENCOUNTER — OUTPATIENT (OUTPATIENT)
Dept: OUTPATIENT SERVICES | Facility: HOSPITAL | Age: 53
LOS: 1 days | End: 2018-06-15
Payer: SELF-PAY

## 2018-06-15 ENCOUNTER — APPOINTMENT (OUTPATIENT)
Dept: PEDIATRIC ALLERGY IMMUNOLOGY | Facility: CLINIC | Age: 53
End: 2018-06-15
Payer: COMMERCIAL

## 2018-06-15 DIAGNOSIS — J30.9 ALLERGIC RHINITIS, UNSPECIFIED: ICD-10-CM

## 2018-06-15 PROCEDURE — 95165 ANTIGEN THERAPY SERVICES: CPT | Mod: NC

## 2018-06-15 PROCEDURE — 95117 IMMUNOTHERAPY INJECTIONS: CPT

## 2018-06-15 PROCEDURE — 95117 IMMUNOTHERAPY INJECTIONS: CPT | Mod: NC

## 2018-06-15 PROCEDURE — 95165 ANTIGEN THERAPY SERVICES: CPT

## 2018-06-18 DIAGNOSIS — J30.81 ALLERGIC RHINITIS DUE TO ANIMAL (CAT) (DOG) HAIR AND DANDER: ICD-10-CM

## 2018-06-18 DIAGNOSIS — J30.1 ALLERGIC RHINITIS DUE TO POLLEN: ICD-10-CM

## 2018-06-18 DIAGNOSIS — J30.89 OTHER ALLERGIC RHINITIS: ICD-10-CM

## 2018-06-18 DIAGNOSIS — Z51.6 ENCOUNTER FOR DESENSITIZATION TO ALLERGENS: ICD-10-CM

## 2018-06-22 ENCOUNTER — APPOINTMENT (OUTPATIENT)
Dept: PEDIATRIC ALLERGY IMMUNOLOGY | Facility: CLINIC | Age: 53
End: 2018-06-22
Payer: COMMERCIAL

## 2018-06-22 ENCOUNTER — OUTPATIENT (OUTPATIENT)
Dept: OUTPATIENT SERVICES | Facility: HOSPITAL | Age: 53
LOS: 1 days | End: 2018-06-22
Payer: SELF-PAY

## 2018-06-22 DIAGNOSIS — J30.9 ALLERGIC RHINITIS, UNSPECIFIED: ICD-10-CM

## 2018-06-22 PROCEDURE — 95117 IMMUNOTHERAPY INJECTIONS: CPT

## 2018-06-22 PROCEDURE — 95117 IMMUNOTHERAPY INJECTIONS: CPT | Mod: NC

## 2018-06-22 PROCEDURE — 95165 ANTIGEN THERAPY SERVICES: CPT

## 2018-06-22 PROCEDURE — 95165 ANTIGEN THERAPY SERVICES: CPT | Mod: NC

## 2018-06-25 DIAGNOSIS — J30.1 ALLERGIC RHINITIS DUE TO POLLEN: ICD-10-CM

## 2018-06-25 DIAGNOSIS — J30.89 OTHER ALLERGIC RHINITIS: ICD-10-CM

## 2018-06-25 DIAGNOSIS — J30.81 ALLERGIC RHINITIS DUE TO ANIMAL (CAT) (DOG) HAIR AND DANDER: ICD-10-CM

## 2018-06-25 DIAGNOSIS — Z51.6 ENCOUNTER FOR DESENSITIZATION TO ALLERGENS: ICD-10-CM

## 2018-06-29 ENCOUNTER — OUTPATIENT (OUTPATIENT)
Dept: OUTPATIENT SERVICES | Facility: HOSPITAL | Age: 53
LOS: 1 days | End: 2018-06-29
Payer: SELF-PAY

## 2018-06-29 ENCOUNTER — APPOINTMENT (OUTPATIENT)
Dept: PEDIATRIC ALLERGY IMMUNOLOGY | Facility: CLINIC | Age: 53
End: 2018-06-29
Payer: COMMERCIAL

## 2018-06-29 DIAGNOSIS — J30.9 ALLERGIC RHINITIS, UNSPECIFIED: ICD-10-CM

## 2018-06-29 PROCEDURE — 95117 IMMUNOTHERAPY INJECTIONS: CPT | Mod: NC

## 2018-06-29 PROCEDURE — 95165 ANTIGEN THERAPY SERVICES: CPT

## 2018-06-29 PROCEDURE — 95165 ANTIGEN THERAPY SERVICES: CPT | Mod: NC

## 2018-06-29 PROCEDURE — 95117 IMMUNOTHERAPY INJECTIONS: CPT

## 2018-07-02 DIAGNOSIS — Z51.6 ENCOUNTER FOR DESENSITIZATION TO ALLERGENS: ICD-10-CM

## 2018-07-02 DIAGNOSIS — J30.1 ALLERGIC RHINITIS DUE TO POLLEN: ICD-10-CM

## 2018-07-02 DIAGNOSIS — J30.81 ALLERGIC RHINITIS DUE TO ANIMAL (CAT) (DOG) HAIR AND DANDER: ICD-10-CM

## 2018-07-02 DIAGNOSIS — J30.89 OTHER ALLERGIC RHINITIS: ICD-10-CM

## 2018-07-13 ENCOUNTER — APPOINTMENT (OUTPATIENT)
Dept: PEDIATRIC ALLERGY IMMUNOLOGY | Facility: CLINIC | Age: 53
End: 2018-07-13
Payer: COMMERCIAL

## 2018-07-13 ENCOUNTER — OUTPATIENT (OUTPATIENT)
Dept: OUTPATIENT SERVICES | Facility: HOSPITAL | Age: 53
LOS: 1 days | End: 2018-07-13
Payer: SELF-PAY

## 2018-07-13 DIAGNOSIS — J30.9 ALLERGIC RHINITIS, UNSPECIFIED: ICD-10-CM

## 2018-07-13 PROCEDURE — 95117 IMMUNOTHERAPY INJECTIONS: CPT | Mod: NC

## 2018-07-13 PROCEDURE — 95165 ANTIGEN THERAPY SERVICES: CPT

## 2018-07-13 PROCEDURE — 95117 IMMUNOTHERAPY INJECTIONS: CPT

## 2018-07-13 PROCEDURE — 95165 ANTIGEN THERAPY SERVICES: CPT | Mod: NC

## 2018-07-18 DIAGNOSIS — J30.89 OTHER ALLERGIC RHINITIS: ICD-10-CM

## 2018-07-18 DIAGNOSIS — Z51.6 ENCOUNTER FOR DESENSITIZATION TO ALLERGENS: ICD-10-CM

## 2018-07-18 DIAGNOSIS — J30.81 ALLERGIC RHINITIS DUE TO ANIMAL (CAT) (DOG) HAIR AND DANDER: ICD-10-CM

## 2018-07-18 DIAGNOSIS — J30.1 ALLERGIC RHINITIS DUE TO POLLEN: ICD-10-CM

## 2018-07-20 ENCOUNTER — OUTPATIENT (OUTPATIENT)
Dept: OUTPATIENT SERVICES | Facility: HOSPITAL | Age: 53
LOS: 1 days | End: 2018-07-20
Payer: SELF-PAY

## 2018-07-20 ENCOUNTER — APPOINTMENT (OUTPATIENT)
Dept: PEDIATRIC ALLERGY IMMUNOLOGY | Facility: CLINIC | Age: 53
End: 2018-07-20
Payer: COMMERCIAL

## 2018-07-20 DIAGNOSIS — Z51.6 ENCOUNTER FOR DESENSITIZATION TO ALLERGENS: ICD-10-CM

## 2018-07-20 DIAGNOSIS — J30.9 ALLERGIC RHINITIS, UNSPECIFIED: ICD-10-CM

## 2018-07-20 DIAGNOSIS — J30.1 ALLERGIC RHINITIS DUE TO POLLEN: ICD-10-CM

## 2018-07-20 DIAGNOSIS — J30.89 OTHER ALLERGIC RHINITIS: ICD-10-CM

## 2018-07-20 DIAGNOSIS — J30.81 ALLERGIC RHINITIS DUE TO ANIMAL (CAT) (DOG) HAIR AND DANDER: ICD-10-CM

## 2018-07-20 PROCEDURE — 95165 ANTIGEN THERAPY SERVICES: CPT | Mod: NC

## 2018-07-20 PROCEDURE — 95117 IMMUNOTHERAPY INJECTIONS: CPT | Mod: NC

## 2018-07-20 PROCEDURE — 95117 IMMUNOTHERAPY INJECTIONS: CPT

## 2018-07-20 PROCEDURE — 95165 ANTIGEN THERAPY SERVICES: CPT

## 2018-07-22 PROBLEM — Z80.0 FAMILY HISTORY OF COLON CANCER: Status: INACTIVE | Noted: 2017-11-14

## 2018-07-27 ENCOUNTER — APPOINTMENT (OUTPATIENT)
Dept: PEDIATRIC ALLERGY IMMUNOLOGY | Facility: CLINIC | Age: 53
End: 2018-07-27
Payer: COMMERCIAL

## 2018-07-27 ENCOUNTER — OUTPATIENT (OUTPATIENT)
Dept: OUTPATIENT SERVICES | Facility: HOSPITAL | Age: 53
LOS: 1 days | End: 2018-07-27
Payer: SELF-PAY

## 2018-07-27 DIAGNOSIS — J30.1 ALLERGIC RHINITIS DUE TO POLLEN: ICD-10-CM

## 2018-07-27 DIAGNOSIS — J30.81 ALLERGIC RHINITIS DUE TO ANIMAL (CAT) (DOG) HAIR AND DANDER: ICD-10-CM

## 2018-07-27 DIAGNOSIS — J30.89 OTHER ALLERGIC RHINITIS: ICD-10-CM

## 2018-07-27 DIAGNOSIS — Z51.6 ENCOUNTER FOR DESENSITIZATION TO ALLERGENS: ICD-10-CM

## 2018-07-27 DIAGNOSIS — J30.9 ALLERGIC RHINITIS, UNSPECIFIED: ICD-10-CM

## 2018-07-27 PROCEDURE — 95117 IMMUNOTHERAPY INJECTIONS: CPT | Mod: NC

## 2018-07-27 PROCEDURE — 95117 IMMUNOTHERAPY INJECTIONS: CPT

## 2018-07-27 PROCEDURE — 95165 ANTIGEN THERAPY SERVICES: CPT

## 2018-07-27 PROCEDURE — 95165 ANTIGEN THERAPY SERVICES: CPT | Mod: NC

## 2018-08-03 ENCOUNTER — APPOINTMENT (OUTPATIENT)
Dept: PEDIATRIC ALLERGY IMMUNOLOGY | Facility: CLINIC | Age: 53
End: 2018-08-03
Payer: COMMERCIAL

## 2018-08-03 ENCOUNTER — OUTPATIENT (OUTPATIENT)
Dept: OUTPATIENT SERVICES | Facility: HOSPITAL | Age: 53
LOS: 1 days | End: 2018-08-03
Payer: SELF-PAY

## 2018-08-03 DIAGNOSIS — J30.81 ALLERGIC RHINITIS DUE TO ANIMAL (CAT) (DOG) HAIR AND DANDER: ICD-10-CM

## 2018-08-03 DIAGNOSIS — J30.1 ALLERGIC RHINITIS DUE TO POLLEN: ICD-10-CM

## 2018-08-03 DIAGNOSIS — J30.9 ALLERGIC RHINITIS, UNSPECIFIED: ICD-10-CM

## 2018-08-03 DIAGNOSIS — J30.89 OTHER ALLERGIC RHINITIS: ICD-10-CM

## 2018-08-03 DIAGNOSIS — Z51.6 ENCOUNTER FOR DESENSITIZATION TO ALLERGENS: ICD-10-CM

## 2018-08-03 PROCEDURE — 95117 IMMUNOTHERAPY INJECTIONS: CPT

## 2018-08-03 PROCEDURE — 95117 IMMUNOTHERAPY INJECTIONS: CPT | Mod: NC

## 2018-08-03 PROCEDURE — 95165 ANTIGEN THERAPY SERVICES: CPT

## 2018-08-03 PROCEDURE — 95165 ANTIGEN THERAPY SERVICES: CPT | Mod: NC

## 2018-09-07 ENCOUNTER — OUTPATIENT (OUTPATIENT)
Dept: OUTPATIENT SERVICES | Facility: HOSPITAL | Age: 53
LOS: 1 days | End: 2018-09-07
Payer: SELF-PAY

## 2018-09-07 ENCOUNTER — APPOINTMENT (OUTPATIENT)
Dept: PEDIATRIC ALLERGY IMMUNOLOGY | Facility: CLINIC | Age: 53
End: 2018-09-07
Payer: COMMERCIAL

## 2018-09-07 DIAGNOSIS — J30.9 ALLERGIC RHINITIS, UNSPECIFIED: ICD-10-CM

## 2018-09-07 PROCEDURE — 95117 IMMUNOTHERAPY INJECTIONS: CPT

## 2018-09-07 PROCEDURE — 95117 IMMUNOTHERAPY INJECTIONS: CPT | Mod: NC

## 2018-09-07 PROCEDURE — 95165 ANTIGEN THERAPY SERVICES: CPT

## 2018-09-07 PROCEDURE — 95165 ANTIGEN THERAPY SERVICES: CPT | Mod: NC

## 2018-09-12 DIAGNOSIS — J30.81 ALLERGIC RHINITIS DUE TO ANIMAL (CAT) (DOG) HAIR AND DANDER: ICD-10-CM

## 2018-09-12 DIAGNOSIS — Z51.6 ENCOUNTER FOR DESENSITIZATION TO ALLERGENS: ICD-10-CM

## 2018-09-12 DIAGNOSIS — J30.1 ALLERGIC RHINITIS DUE TO POLLEN: ICD-10-CM

## 2018-09-12 DIAGNOSIS — J30.89 OTHER ALLERGIC RHINITIS: ICD-10-CM

## 2018-09-14 ENCOUNTER — OUTPATIENT (OUTPATIENT)
Dept: OUTPATIENT SERVICES | Facility: HOSPITAL | Age: 53
LOS: 1 days | End: 2018-09-14
Payer: SELF-PAY

## 2018-09-14 ENCOUNTER — APPOINTMENT (OUTPATIENT)
Dept: PEDIATRIC ALLERGY IMMUNOLOGY | Facility: CLINIC | Age: 53
End: 2018-09-14
Payer: COMMERCIAL

## 2018-09-14 DIAGNOSIS — J30.9 ALLERGIC RHINITIS, UNSPECIFIED: ICD-10-CM

## 2018-09-14 PROCEDURE — 95117 IMMUNOTHERAPY INJECTIONS: CPT

## 2018-09-14 PROCEDURE — 95117 IMMUNOTHERAPY INJECTIONS: CPT | Mod: NC

## 2018-09-14 PROCEDURE — 95165 ANTIGEN THERAPY SERVICES: CPT

## 2018-09-14 PROCEDURE — 95165 ANTIGEN THERAPY SERVICES: CPT | Mod: NC

## 2018-09-18 DIAGNOSIS — J30.81 ALLERGIC RHINITIS DUE TO ANIMAL (CAT) (DOG) HAIR AND DANDER: ICD-10-CM

## 2018-09-18 DIAGNOSIS — Z51.6 ENCOUNTER FOR DESENSITIZATION TO ALLERGENS: ICD-10-CM

## 2018-09-18 DIAGNOSIS — J30.89 OTHER ALLERGIC RHINITIS: ICD-10-CM

## 2018-09-18 DIAGNOSIS — J30.1 ALLERGIC RHINITIS DUE TO POLLEN: ICD-10-CM

## 2018-09-21 ENCOUNTER — APPOINTMENT (OUTPATIENT)
Dept: PEDIATRIC ALLERGY IMMUNOLOGY | Facility: CLINIC | Age: 53
End: 2018-09-21

## 2018-09-28 ENCOUNTER — APPOINTMENT (OUTPATIENT)
Dept: PEDIATRIC ALLERGY IMMUNOLOGY | Facility: CLINIC | Age: 53
End: 2018-09-28
Payer: COMMERCIAL

## 2018-09-28 ENCOUNTER — OUTPATIENT (OUTPATIENT)
Dept: OUTPATIENT SERVICES | Facility: HOSPITAL | Age: 53
LOS: 1 days | End: 2018-09-28
Payer: SELF-PAY

## 2018-09-28 DIAGNOSIS — J30.81 ALLERGIC RHINITIS DUE TO ANIMAL (CAT) (DOG) HAIR AND DANDER: ICD-10-CM

## 2018-09-28 DIAGNOSIS — Z51.6 ENCOUNTER FOR DESENSITIZATION TO ALLERGENS: ICD-10-CM

## 2018-09-28 DIAGNOSIS — J30.9 ALLERGIC RHINITIS, UNSPECIFIED: ICD-10-CM

## 2018-09-28 DIAGNOSIS — J30.1 ALLERGIC RHINITIS DUE TO POLLEN: ICD-10-CM

## 2018-09-28 DIAGNOSIS — J30.89 OTHER ALLERGIC RHINITIS: ICD-10-CM

## 2018-09-28 PROCEDURE — 95117 IMMUNOTHERAPY INJECTIONS: CPT

## 2018-09-28 PROCEDURE — 95117 IMMUNOTHERAPY INJECTIONS: CPT | Mod: NC

## 2018-09-28 PROCEDURE — 95165 ANTIGEN THERAPY SERVICES: CPT | Mod: NC

## 2018-09-28 PROCEDURE — 95165 ANTIGEN THERAPY SERVICES: CPT

## 2018-10-05 ENCOUNTER — APPOINTMENT (OUTPATIENT)
Dept: PEDIATRIC ALLERGY IMMUNOLOGY | Facility: CLINIC | Age: 53
End: 2018-10-05
Payer: COMMERCIAL

## 2018-10-05 ENCOUNTER — OUTPATIENT (OUTPATIENT)
Dept: OUTPATIENT SERVICES | Facility: HOSPITAL | Age: 53
LOS: 1 days | End: 2018-10-05
Payer: SELF-PAY

## 2018-10-05 PROCEDURE — 95165 ANTIGEN THERAPY SERVICES: CPT | Mod: NC

## 2018-10-05 PROCEDURE — 95165 ANTIGEN THERAPY SERVICES: CPT

## 2018-10-05 PROCEDURE — 95117 IMMUNOTHERAPY INJECTIONS: CPT

## 2018-10-05 PROCEDURE — 95117 IMMUNOTHERAPY INJECTIONS: CPT | Mod: NC

## 2018-10-12 ENCOUNTER — OUTPATIENT (OUTPATIENT)
Dept: OUTPATIENT SERVICES | Facility: HOSPITAL | Age: 53
LOS: 1 days | End: 2018-10-12
Payer: SELF-PAY

## 2018-10-12 ENCOUNTER — APPOINTMENT (OUTPATIENT)
Dept: PEDIATRIC ALLERGY IMMUNOLOGY | Facility: CLINIC | Age: 53
End: 2018-10-12
Payer: COMMERCIAL

## 2018-10-12 DIAGNOSIS — J30.9 ALLERGIC RHINITIS, UNSPECIFIED: ICD-10-CM

## 2018-10-12 DIAGNOSIS — J30.1 ALLERGIC RHINITIS DUE TO POLLEN: ICD-10-CM

## 2018-10-12 DIAGNOSIS — J30.89 OTHER ALLERGIC RHINITIS: ICD-10-CM

## 2018-10-12 DIAGNOSIS — Z51.6 ENCOUNTER FOR DESENSITIZATION TO ALLERGENS: ICD-10-CM

## 2018-10-12 DIAGNOSIS — J30.81 ALLERGIC RHINITIS DUE TO ANIMAL (CAT) (DOG) HAIR AND DANDER: ICD-10-CM

## 2018-10-12 PROCEDURE — 95117 IMMUNOTHERAPY INJECTIONS: CPT | Mod: NC

## 2018-10-12 PROCEDURE — 95165 ANTIGEN THERAPY SERVICES: CPT

## 2018-10-12 PROCEDURE — 95117 IMMUNOTHERAPY INJECTIONS: CPT

## 2018-10-12 PROCEDURE — 95165 ANTIGEN THERAPY SERVICES: CPT | Mod: NC

## 2018-10-16 DIAGNOSIS — J30.89 OTHER ALLERGIC RHINITIS: ICD-10-CM

## 2018-10-16 DIAGNOSIS — J30.1 ALLERGIC RHINITIS DUE TO POLLEN: ICD-10-CM

## 2018-10-16 DIAGNOSIS — Z51.6 ENCOUNTER FOR DESENSITIZATION TO ALLERGENS: ICD-10-CM

## 2018-10-16 DIAGNOSIS — J30.81 ALLERGIC RHINITIS DUE TO ANIMAL (CAT) (DOG) HAIR AND DANDER: ICD-10-CM

## 2018-11-16 ENCOUNTER — APPOINTMENT (OUTPATIENT)
Dept: PEDIATRIC ALLERGY IMMUNOLOGY | Facility: CLINIC | Age: 53
End: 2018-11-16

## 2018-12-07 ENCOUNTER — OUTPATIENT (OUTPATIENT)
Dept: OUTPATIENT SERVICES | Facility: HOSPITAL | Age: 53
LOS: 1 days | End: 2018-12-07
Payer: SELF-PAY

## 2018-12-07 ENCOUNTER — APPOINTMENT (OUTPATIENT)
Dept: PEDIATRIC ALLERGY IMMUNOLOGY | Facility: CLINIC | Age: 53
End: 2018-12-07
Payer: COMMERCIAL

## 2018-12-07 DIAGNOSIS — J30.9 ALLERGIC RHINITIS, UNSPECIFIED: ICD-10-CM

## 2018-12-07 PROCEDURE — 95165 ANTIGEN THERAPY SERVICES: CPT

## 2018-12-07 PROCEDURE — 95165 ANTIGEN THERAPY SERVICES: CPT | Mod: NC

## 2018-12-07 PROCEDURE — 95117 IMMUNOTHERAPY INJECTIONS: CPT

## 2018-12-07 PROCEDURE — 95117 IMMUNOTHERAPY INJECTIONS: CPT | Mod: NC

## 2018-12-11 DIAGNOSIS — J30.89 OTHER ALLERGIC RHINITIS: ICD-10-CM

## 2018-12-11 DIAGNOSIS — J30.1 ALLERGIC RHINITIS DUE TO POLLEN: ICD-10-CM

## 2018-12-11 DIAGNOSIS — J30.81 ALLERGIC RHINITIS DUE TO ANIMAL (CAT) (DOG) HAIR AND DANDER: ICD-10-CM

## 2018-12-11 DIAGNOSIS — Z51.6 ENCOUNTER FOR DESENSITIZATION TO ALLERGENS: ICD-10-CM

## 2018-12-13 ENCOUNTER — LABORATORY RESULT (OUTPATIENT)
Age: 53
End: 2018-12-13

## 2018-12-14 ENCOUNTER — OUTPATIENT (OUTPATIENT)
Dept: OUTPATIENT SERVICES | Facility: HOSPITAL | Age: 53
LOS: 1 days | End: 2018-12-14
Payer: SELF-PAY

## 2018-12-14 ENCOUNTER — APPOINTMENT (OUTPATIENT)
Dept: PEDIATRIC ALLERGY IMMUNOLOGY | Facility: CLINIC | Age: 53
End: 2018-12-14
Payer: COMMERCIAL

## 2018-12-14 ENCOUNTER — APPOINTMENT (OUTPATIENT)
Dept: OBGYN | Facility: CLINIC | Age: 53
End: 2018-12-14
Payer: COMMERCIAL

## 2018-12-14 ENCOUNTER — LABORATORY RESULT (OUTPATIENT)
Age: 53
End: 2018-12-14

## 2018-12-14 VITALS — BODY MASS INDEX: 31.95 KG/M2 | WEIGHT: 204 LBS | DIASTOLIC BLOOD PRESSURE: 90 MMHG | SYSTOLIC BLOOD PRESSURE: 150 MMHG

## 2018-12-14 DIAGNOSIS — J30.9 ALLERGIC RHINITIS, UNSPECIFIED: ICD-10-CM

## 2018-12-14 DIAGNOSIS — N89.8 OTHER SPECIFIED NONINFLAMMATORY DISORDERS OF VAGINA: ICD-10-CM

## 2018-12-14 DIAGNOSIS — Z01.419 ENCOUNTER FOR GYNECOLOGICAL EXAMINATION (GENERAL) (ROUTINE) W/OUT ABNORMAL FINDINGS: ICD-10-CM

## 2018-12-14 DIAGNOSIS — N76.0 ACUTE VAGINITIS: ICD-10-CM

## 2018-12-14 DIAGNOSIS — R10.2 PELVIC AND PERINEAL PAIN: ICD-10-CM

## 2018-12-14 DIAGNOSIS — Z01.419 ENCOUNTER FOR GYNECOLOGICAL EXAMINATION (GENERAL) (ROUTINE) WITHOUT ABNORMAL FINDINGS: ICD-10-CM

## 2018-12-14 PROCEDURE — 95117 IMMUNOTHERAPY INJECTIONS: CPT | Mod: NC

## 2018-12-14 PROCEDURE — 95117 IMMUNOTHERAPY INJECTIONS: CPT

## 2018-12-14 PROCEDURE — 95165 ANTIGEN THERAPY SERVICES: CPT | Mod: NC

## 2018-12-14 PROCEDURE — 95165 ANTIGEN THERAPY SERVICES: CPT

## 2018-12-14 PROCEDURE — 87186 SC STD MICRODIL/AGAR DIL: CPT

## 2018-12-14 PROCEDURE — 87086 URINE CULTURE/COLONY COUNT: CPT

## 2018-12-14 PROCEDURE — 87624 HPV HI-RISK TYP POOLED RSLT: CPT

## 2018-12-14 PROCEDURE — 99396 PREV VISIT EST AGE 40-64: CPT

## 2018-12-14 PROCEDURE — G0463: CPT

## 2018-12-17 DIAGNOSIS — J30.1 ALLERGIC RHINITIS DUE TO POLLEN: ICD-10-CM

## 2018-12-17 DIAGNOSIS — Z51.6 ENCOUNTER FOR DESENSITIZATION TO ALLERGENS: ICD-10-CM

## 2018-12-17 DIAGNOSIS — J30.81 ALLERGIC RHINITIS DUE TO ANIMAL (CAT) (DOG) HAIR AND DANDER: ICD-10-CM

## 2018-12-17 DIAGNOSIS — J30.89 OTHER ALLERGIC RHINITIS: ICD-10-CM

## 2018-12-21 ENCOUNTER — OUTPATIENT (OUTPATIENT)
Dept: OUTPATIENT SERVICES | Facility: HOSPITAL | Age: 53
LOS: 1 days | End: 2018-12-21
Payer: SELF-PAY

## 2018-12-21 ENCOUNTER — APPOINTMENT (OUTPATIENT)
Dept: PEDIATRIC ALLERGY IMMUNOLOGY | Facility: CLINIC | Age: 53
End: 2018-12-21
Payer: COMMERCIAL

## 2018-12-21 DIAGNOSIS — J30.9 ALLERGIC RHINITIS, UNSPECIFIED: ICD-10-CM

## 2018-12-21 PROCEDURE — 95117 IMMUNOTHERAPY INJECTIONS: CPT | Mod: NC

## 2018-12-21 PROCEDURE — 95165 ANTIGEN THERAPY SERVICES: CPT | Mod: NC

## 2018-12-21 PROCEDURE — 95165 ANTIGEN THERAPY SERVICES: CPT

## 2018-12-21 PROCEDURE — 95117 IMMUNOTHERAPY INJECTIONS: CPT

## 2018-12-24 DIAGNOSIS — J30.89 OTHER ALLERGIC RHINITIS: ICD-10-CM

## 2018-12-24 DIAGNOSIS — J30.1 ALLERGIC RHINITIS DUE TO POLLEN: ICD-10-CM

## 2018-12-24 DIAGNOSIS — Z51.6 ENCOUNTER FOR DESENSITIZATION TO ALLERGENS: ICD-10-CM

## 2018-12-24 DIAGNOSIS — J30.81 ALLERGIC RHINITIS DUE TO ANIMAL (CAT) (DOG) HAIR AND DANDER: ICD-10-CM

## 2018-12-28 ENCOUNTER — OUTPATIENT (OUTPATIENT)
Dept: OUTPATIENT SERVICES | Facility: HOSPITAL | Age: 53
LOS: 1 days | End: 2018-12-28
Payer: SELF-PAY

## 2018-12-28 ENCOUNTER — APPOINTMENT (OUTPATIENT)
Dept: PEDIATRIC ALLERGY IMMUNOLOGY | Facility: CLINIC | Age: 53
End: 2018-12-28
Payer: COMMERCIAL

## 2018-12-28 DIAGNOSIS — J30.9 ALLERGIC RHINITIS, UNSPECIFIED: ICD-10-CM

## 2018-12-28 PROCEDURE — 95117 IMMUNOTHERAPY INJECTIONS: CPT

## 2018-12-28 PROCEDURE — 95165 ANTIGEN THERAPY SERVICES: CPT

## 2018-12-28 PROCEDURE — 95165 ANTIGEN THERAPY SERVICES: CPT | Mod: NC

## 2018-12-28 PROCEDURE — 95117 IMMUNOTHERAPY INJECTIONS: CPT | Mod: NC

## 2019-01-04 ENCOUNTER — OUTPATIENT (OUTPATIENT)
Dept: OUTPATIENT SERVICES | Facility: HOSPITAL | Age: 54
LOS: 1 days | End: 2019-01-04
Payer: SELF-PAY

## 2019-01-04 ENCOUNTER — FORM ENCOUNTER (OUTPATIENT)
Age: 54
End: 2019-01-04

## 2019-01-04 ENCOUNTER — APPOINTMENT (OUTPATIENT)
Dept: PEDIATRIC ALLERGY IMMUNOLOGY | Facility: CLINIC | Age: 54
End: 2019-01-04
Payer: COMMERCIAL

## 2019-01-04 DIAGNOSIS — J30.89 OTHER ALLERGIC RHINITIS: ICD-10-CM

## 2019-01-04 DIAGNOSIS — J30.1 ALLERGIC RHINITIS DUE TO POLLEN: ICD-10-CM

## 2019-01-04 DIAGNOSIS — J30.9 ALLERGIC RHINITIS, UNSPECIFIED: ICD-10-CM

## 2019-01-04 DIAGNOSIS — Z51.6 ENCOUNTER FOR DESENSITIZATION TO ALLERGENS: ICD-10-CM

## 2019-01-04 DIAGNOSIS — J30.81 ALLERGIC RHINITIS DUE TO ANIMAL (CAT) (DOG) HAIR AND DANDER: ICD-10-CM

## 2019-01-04 PROCEDURE — 95165 ANTIGEN THERAPY SERVICES: CPT | Mod: NC

## 2019-01-04 PROCEDURE — 95117 IMMUNOTHERAPY INJECTIONS: CPT | Mod: NC

## 2019-01-04 PROCEDURE — 95117 IMMUNOTHERAPY INJECTIONS: CPT

## 2019-01-04 PROCEDURE — 95165 ANTIGEN THERAPY SERVICES: CPT

## 2019-01-05 ENCOUNTER — APPOINTMENT (OUTPATIENT)
Dept: MAMMOGRAPHY | Facility: IMAGING CENTER | Age: 54
End: 2019-01-05
Payer: COMMERCIAL

## 2019-01-05 ENCOUNTER — APPOINTMENT (OUTPATIENT)
Dept: ULTRASOUND IMAGING | Facility: IMAGING CENTER | Age: 54
End: 2019-01-05
Payer: COMMERCIAL

## 2019-01-05 ENCOUNTER — OUTPATIENT (OUTPATIENT)
Dept: OUTPATIENT SERVICES | Facility: HOSPITAL | Age: 54
LOS: 1 days | End: 2019-01-05
Payer: SELF-PAY

## 2019-01-05 DIAGNOSIS — R10.2 PELVIC AND PERINEAL PAIN: ICD-10-CM

## 2019-01-05 DIAGNOSIS — Z01.419 ENCOUNTER FOR GYNECOLOGICAL EXAMINATION (GENERAL) (ROUTINE) WITHOUT ABNORMAL FINDINGS: ICD-10-CM

## 2019-01-05 PROCEDURE — 76856 US EXAM PELVIC COMPLETE: CPT

## 2019-01-05 PROCEDURE — 76856 US EXAM PELVIC COMPLETE: CPT | Mod: 26

## 2019-01-05 PROCEDURE — 77067 SCR MAMMO BI INCL CAD: CPT | Mod: 26

## 2019-01-05 PROCEDURE — 76830 TRANSVAGINAL US NON-OB: CPT | Mod: 26

## 2019-01-05 PROCEDURE — 77067 SCR MAMMO BI INCL CAD: CPT

## 2019-01-05 PROCEDURE — 77063 BREAST TOMOSYNTHESIS BI: CPT

## 2019-01-05 PROCEDURE — 77063 BREAST TOMOSYNTHESIS BI: CPT | Mod: 26

## 2019-01-05 PROCEDURE — 76830 TRANSVAGINAL US NON-OB: CPT

## 2019-01-07 DIAGNOSIS — N64.89 OTHER SPECIFIED DISORDERS OF BREAST: ICD-10-CM

## 2019-01-08 ENCOUNTER — FORM ENCOUNTER (OUTPATIENT)
Age: 54
End: 2019-01-08

## 2019-01-08 DIAGNOSIS — J30.1 ALLERGIC RHINITIS DUE TO POLLEN: ICD-10-CM

## 2019-01-08 DIAGNOSIS — J30.81 ALLERGIC RHINITIS DUE TO ANIMAL (CAT) (DOG) HAIR AND DANDER: ICD-10-CM

## 2019-01-08 DIAGNOSIS — Z51.6 ENCOUNTER FOR DESENSITIZATION TO ALLERGENS: ICD-10-CM

## 2019-01-08 DIAGNOSIS — J30.89 OTHER ALLERGIC RHINITIS: ICD-10-CM

## 2019-01-09 ENCOUNTER — APPOINTMENT (OUTPATIENT)
Dept: ULTRASOUND IMAGING | Facility: IMAGING CENTER | Age: 54
End: 2019-01-09
Payer: COMMERCIAL

## 2019-01-09 ENCOUNTER — APPOINTMENT (OUTPATIENT)
Dept: MAMMOGRAPHY | Facility: IMAGING CENTER | Age: 54
End: 2019-01-09
Payer: COMMERCIAL

## 2019-01-09 ENCOUNTER — OUTPATIENT (OUTPATIENT)
Dept: OUTPATIENT SERVICES | Facility: HOSPITAL | Age: 54
LOS: 1 days | End: 2019-01-09
Payer: SELF-PAY

## 2019-01-09 ENCOUNTER — APPOINTMENT (OUTPATIENT)
Dept: PEDIATRIC ALLERGY IMMUNOLOGY | Facility: CLINIC | Age: 54
End: 2019-01-09
Payer: COMMERCIAL

## 2019-01-09 DIAGNOSIS — N64.89 OTHER SPECIFIED DISORDERS OF BREAST: ICD-10-CM

## 2019-01-09 PROCEDURE — 95117 IMMUNOTHERAPY INJECTIONS: CPT | Mod: NC,GC

## 2019-01-09 PROCEDURE — 76642 ULTRASOUND BREAST LIMITED: CPT | Mod: 26,LT

## 2019-01-09 PROCEDURE — 77065 DX MAMMO INCL CAD UNI: CPT | Mod: 26,LT

## 2019-01-09 PROCEDURE — G0279: CPT

## 2019-01-09 PROCEDURE — 76642 ULTRASOUND BREAST LIMITED: CPT

## 2019-01-09 PROCEDURE — G0279: CPT | Mod: 26

## 2019-01-09 PROCEDURE — 77065 DX MAMMO INCL CAD UNI: CPT

## 2019-01-09 PROCEDURE — 95117 IMMUNOTHERAPY INJECTIONS: CPT

## 2019-01-09 PROCEDURE — 95165 ANTIGEN THERAPY SERVICES: CPT

## 2019-01-09 PROCEDURE — 95165 ANTIGEN THERAPY SERVICES: CPT | Mod: NC,GC

## 2019-01-11 ENCOUNTER — APPOINTMENT (OUTPATIENT)
Dept: PEDIATRIC ALLERGY IMMUNOLOGY | Facility: CLINIC | Age: 54
End: 2019-01-11
Payer: COMMERCIAL

## 2019-01-14 DIAGNOSIS — J30.1 ALLERGIC RHINITIS DUE TO POLLEN: ICD-10-CM

## 2019-01-14 DIAGNOSIS — J30.89 OTHER ALLERGIC RHINITIS: ICD-10-CM

## 2019-01-14 DIAGNOSIS — Z51.6 ENCOUNTER FOR DESENSITIZATION TO ALLERGENS: ICD-10-CM

## 2019-01-14 DIAGNOSIS — J30.81 ALLERGIC RHINITIS DUE TO ANIMAL (CAT) (DOG) HAIR AND DANDER: ICD-10-CM

## 2019-01-18 ENCOUNTER — OUTPATIENT (OUTPATIENT)
Dept: OUTPATIENT SERVICES | Facility: HOSPITAL | Age: 54
LOS: 1 days | End: 2019-01-18
Payer: SELF-PAY

## 2019-01-18 ENCOUNTER — APPOINTMENT (OUTPATIENT)
Dept: PEDIATRIC ALLERGY IMMUNOLOGY | Facility: CLINIC | Age: 54
End: 2019-01-18
Payer: COMMERCIAL

## 2019-01-18 DIAGNOSIS — J30.9 ALLERGIC RHINITIS, UNSPECIFIED: ICD-10-CM

## 2019-01-18 PROCEDURE — 95117 IMMUNOTHERAPY INJECTIONS: CPT | Mod: NC

## 2019-01-18 PROCEDURE — 95165 ANTIGEN THERAPY SERVICES: CPT

## 2019-01-18 PROCEDURE — 95117 IMMUNOTHERAPY INJECTIONS: CPT

## 2019-01-18 PROCEDURE — 95165 ANTIGEN THERAPY SERVICES: CPT | Mod: NC

## 2019-01-25 ENCOUNTER — APPOINTMENT (OUTPATIENT)
Dept: PEDIATRIC ALLERGY IMMUNOLOGY | Facility: CLINIC | Age: 54
End: 2019-01-25

## 2019-01-25 ENCOUNTER — APPOINTMENT (OUTPATIENT)
Dept: INTERNAL MEDICINE | Facility: CLINIC | Age: 54
End: 2019-01-25

## 2019-01-25 DIAGNOSIS — J30.1 ALLERGIC RHINITIS DUE TO POLLEN: ICD-10-CM

## 2019-01-25 DIAGNOSIS — J30.89 OTHER ALLERGIC RHINITIS: ICD-10-CM

## 2019-01-25 DIAGNOSIS — J30.81 ALLERGIC RHINITIS DUE TO ANIMAL (CAT) (DOG) HAIR AND DANDER: ICD-10-CM

## 2019-01-25 DIAGNOSIS — Z51.6 ENCOUNTER FOR DESENSITIZATION TO ALLERGENS: ICD-10-CM

## 2019-02-01 ENCOUNTER — OUTPATIENT (OUTPATIENT)
Dept: OUTPATIENT SERVICES | Facility: HOSPITAL | Age: 54
LOS: 1 days | End: 2019-02-01
Payer: SELF-PAY

## 2019-02-01 ENCOUNTER — APPOINTMENT (OUTPATIENT)
Dept: PEDIATRIC ALLERGY IMMUNOLOGY | Facility: CLINIC | Age: 54
End: 2019-02-01
Payer: COMMERCIAL

## 2019-02-01 DIAGNOSIS — J30.9 ALLERGIC RHINITIS, UNSPECIFIED: ICD-10-CM

## 2019-02-01 PROCEDURE — 95117 IMMUNOTHERAPY INJECTIONS: CPT | Mod: NC

## 2019-02-01 PROCEDURE — 95165 ANTIGEN THERAPY SERVICES: CPT

## 2019-02-01 PROCEDURE — 95117 IMMUNOTHERAPY INJECTIONS: CPT

## 2019-02-01 PROCEDURE — 95165 ANTIGEN THERAPY SERVICES: CPT | Mod: NC

## 2019-02-04 DIAGNOSIS — J30.81 ALLERGIC RHINITIS DUE TO ANIMAL (CAT) (DOG) HAIR AND DANDER: ICD-10-CM

## 2019-02-04 DIAGNOSIS — J30.1 ALLERGIC RHINITIS DUE TO POLLEN: ICD-10-CM

## 2019-02-04 DIAGNOSIS — J30.89 OTHER ALLERGIC RHINITIS: ICD-10-CM

## 2019-02-04 DIAGNOSIS — Z51.6 ENCOUNTER FOR DESENSITIZATION TO ALLERGENS: ICD-10-CM

## 2019-02-14 ENCOUNTER — RESULT CHARGE (OUTPATIENT)
Age: 54
End: 2019-02-14

## 2019-02-15 ENCOUNTER — APPOINTMENT (OUTPATIENT)
Dept: INTERNAL MEDICINE | Facility: CLINIC | Age: 54
End: 2019-02-15

## 2019-02-15 ENCOUNTER — OUTPATIENT (OUTPATIENT)
Dept: OUTPATIENT SERVICES | Facility: HOSPITAL | Age: 54
LOS: 1 days | End: 2019-02-15
Payer: SELF-PAY

## 2019-02-15 ENCOUNTER — MED ADMIN CHARGE (OUTPATIENT)
Age: 54
End: 2019-02-15

## 2019-02-15 ENCOUNTER — APPOINTMENT (OUTPATIENT)
Dept: PEDIATRIC ALLERGY IMMUNOLOGY | Facility: CLINIC | Age: 54
End: 2019-02-15
Payer: COMMERCIAL

## 2019-02-15 VITALS
BODY MASS INDEX: 31.39 KG/M2 | WEIGHT: 200 LBS | DIASTOLIC BLOOD PRESSURE: 70 MMHG | SYSTOLIC BLOOD PRESSURE: 138 MMHG | HEIGHT: 67 IN

## 2019-02-15 DIAGNOSIS — I10 ESSENTIAL (PRIMARY) HYPERTENSION: ICD-10-CM

## 2019-02-15 DIAGNOSIS — Z51.6 ENCOUNTER FOR DESENSITIZATION TO ALLERGENS: ICD-10-CM

## 2019-02-15 DIAGNOSIS — J30.81 ALLERGIC RHINITIS DUE TO ANIMAL (CAT) (DOG) HAIR AND DANDER: ICD-10-CM

## 2019-02-15 DIAGNOSIS — J30.89 OTHER ALLERGIC RHINITIS: ICD-10-CM

## 2019-02-15 DIAGNOSIS — J30.9 ALLERGIC RHINITIS, UNSPECIFIED: ICD-10-CM

## 2019-02-15 PROCEDURE — 95165 ANTIGEN THERAPY SERVICES: CPT

## 2019-02-15 PROCEDURE — G0463: CPT

## 2019-02-15 PROCEDURE — G0008: CPT

## 2019-02-15 PROCEDURE — 95117 IMMUNOTHERAPY INJECTIONS: CPT

## 2019-02-15 PROCEDURE — 95117 IMMUNOTHERAPY INJECTIONS: CPT | Mod: NC,GC

## 2019-02-15 PROCEDURE — 90688 IIV4 VACCINE SPLT 0.5 ML IM: CPT

## 2019-02-15 PROCEDURE — 95165 ANTIGEN THERAPY SERVICES: CPT | Mod: NC,GC

## 2019-02-18 NOTE — REVIEW OF SYSTEMS
[Nasal Discharge] : nasal discharge [Sore Throat] : sore throat [Chest Pain] : chest pain [see HPI] : see HPI [Negative] : Endocrine [Fever] : no fever [Chills] : no chills [Feeling Poorly] : not feeling poorly [Feeling Tired] : not feeling tired [Eye Pain] : no eye pain [Red Eyes] : eyes not red [Eyesight Problems] : no eyesight problems [Discharge From Eyes] : no purulent discharge from the eyes [Dry Eyes] : no dryness of the eyes [Eyes Itch] : no itching of the eyes [Earache] : no earache [Loss Of Hearing] : no hearing loss [Hoarseness] : no hoarseness [Heart Rate Is Slow] : the heart rate was not slow [Heart Rate Is Fast] : the heart rate was not fast [Leg Claudication] : no intermittent leg claudication [Lower Ext Edema] : no lower extremity edema [Shortness Of Breath] : no shortness of breath [Wheezing] : no wheezing [Cough] : no cough [Abdominal Pain] : no abdominal pain [Vomiting] : no vomiting [Constipation] : no constipation [Diarrhea] : no diarrhea [Arthralgias] : no arthralgias [Joint Pain] : no joint pain [Joint Swelling] : no joint swelling [Joint Stiffness] : no joint stiffness [Limb Pain] : no limb pain [Limb Swelling] : no limb swelling [Confused] : no confusion [Convulsions] : no convulsions [Suicidal] : not suicidal [Sleep Disturbances] : no sleep disturbances

## 2019-02-18 NOTE — HEALTH RISK ASSESSMENT
[Fair] :  ~his/her~ mood as fair [No falls in past year] : Patient reported no falls in the past year [Patient reported mammogram was abnormal] : Patient reported mammogram was abnormal [Patient reported PAP Smear was normal] : Patient reported PAP Smear was normal [] : No [MammogramComments] : repeat diagnostic in 6 months [ColonoscopyComments] : Performed 5 years ago at [facility], plan to acquire report.

## 2019-02-18 NOTE — PHYSICAL EXAM
[General Appearance - Alert] : alert [General Appearance - In No Acute Distress] : in no acute distress [General Appearance - Well Nourished] : well nourished [General Appearance - Well Developed] : well developed [PERRL With Normal Accommodation] : pupils were equal in size, round, and reactive to light [Extraocular Movements] : extraocular movements were intact [Outer Ear] : the ears and nose were normal in appearance [Hearing Threshold Finger Rub Not Parke] : hearing was normal [Both Tympanic Membranes Were Examined] : both tympanic membranes were normal [Neck Appearance] : the appearance of the neck was normal [] : no respiratory distress [Respiration, Rhythm And Depth] : normal respiratory rhythm and effort [Exaggerated Use Of Accessory Muscles For Inspiration] : no accessory muscle use [Auscultation Breath Sounds / Voice Sounds] : lungs were clear to auscultation bilaterally [Heart Rate And Rhythm] : heart rate was normal and rhythm regular [Heart Sounds] : normal S1 and S2 [Murmurs] : no murmurs [Breast Palpation Mass] : no palpable masses [Bowel Sounds] : normal bowel sounds [Abdomen Soft] : soft [Abdomen Tenderness] : non-tender [Cervical Lymph Nodes Enlarged Posterior Bilaterally] : posterior cervical [Cervical Lymph Nodes Enlarged Anterior Bilaterally] : anterior cervical [No CVA Tenderness] : no ~M costovertebral angle tenderness [Abnormal Walk] : normal gait [Musculoskeletal - Swelling] : no joint swelling seen [Skin Color & Pigmentation] : normal skin color and pigmentation [Cranial Nerves] : cranial nerves 2-12 were intact [Sensation] : the sensory exam was normal to light touch and pinprick [Motor Exam] : the motor exam was normal [No Focal Deficits] : no focal deficits [FreeTextEntry1] : Normal gait on toes and heels [Over the Past 2 Weeks, Have You Felt Down, Depressed, or Hopeless?] : 1.) Over the past 2 weeks, have you felt down, depressed, or hopeless? No [Over the Past 2 Weeks, Have You Felt Little Interest or Pleasure Doing Things?] : 2.) Over the past 2 weeks, have you felt little interest or pleasure doing things? No

## 2019-02-18 NOTE — HISTORY OF PRESENT ILLNESS
[FreeTextEntry1] : CPE [de-identified] : 54yo F hx of chronic allergic sinusitis present for CPE this am. Patient reported persistent sinus pressure despite treatment. Patient reported feeling congested and PND chronically. Patient requested to be seen by ENT at this time. Patient denied fever, chills, ear pain or eye sx. Patient also noted chronic sciatica pain. Reported occasional RLE numbness and moderate weakness. Patient has never had imaging for evaluation. Lastly, patient reported one week ago noted left chest discomfort while resting. Patient consider this possibly related to food ingestion. However, she also reported pain related to left upper arm numbness. Patient denied cardiac hx and no palpitation noted.

## 2019-02-18 NOTE — PHYSICAL EXAM
[General Appearance - Alert] : alert [General Appearance - In No Acute Distress] : in no acute distress [General Appearance - Well Nourished] : well nourished [General Appearance - Well Developed] : well developed [PERRL With Normal Accommodation] : pupils were equal in size, round, and reactive to light [Extraocular Movements] : extraocular movements were intact [Outer Ear] : the ears and nose were normal in appearance [Hearing Threshold Finger Rub Not Pope] : hearing was normal [Both Tympanic Membranes Were Examined] : both tympanic membranes were normal [Neck Appearance] : the appearance of the neck was normal [] : no respiratory distress [Respiration, Rhythm And Depth] : normal respiratory rhythm and effort [Exaggerated Use Of Accessory Muscles For Inspiration] : no accessory muscle use [Auscultation Breath Sounds / Voice Sounds] : lungs were clear to auscultation bilaterally [Heart Rate And Rhythm] : heart rate was normal and rhythm regular [Heart Sounds] : normal S1 and S2 [Murmurs] : no murmurs [Breast Palpation Mass] : no palpable masses [Bowel Sounds] : normal bowel sounds [Abdomen Soft] : soft [Abdomen Tenderness] : non-tender [Cervical Lymph Nodes Enlarged Posterior Bilaterally] : posterior cervical [Cervical Lymph Nodes Enlarged Anterior Bilaterally] : anterior cervical [No CVA Tenderness] : no ~M costovertebral angle tenderness [Abnormal Walk] : normal gait [Musculoskeletal - Swelling] : no joint swelling seen [Skin Color & Pigmentation] : normal skin color and pigmentation [Cranial Nerves] : cranial nerves 2-12 were intact [Sensation] : the sensory exam was normal to light touch and pinprick [Motor Exam] : the motor exam was normal [No Focal Deficits] : no focal deficits [FreeTextEntry1] : Normal gait on toes and heels [Over the Past 2 Weeks, Have You Felt Down, Depressed, or Hopeless?] : 1.) Over the past 2 weeks, have you felt down, depressed, or hopeless? No [Over the Past 2 Weeks, Have You Felt Little Interest or Pleasure Doing Things?] : 2.) Over the past 2 weeks, have you felt little interest or pleasure doing things? No

## 2019-02-18 NOTE — ASSESSMENT
[FreeTextEntry1] : 54yo F hx of chronic allergic sinusitis present for CPE this am.\par \par Sinus pressure:\par - ENT referral given per patient request.\par - Patient does not have signs for infection at this time.\par - C/w AI management for allergic rhinitis.\par \par Sciatica\par - Chronic in nature. Given concerning sx including weakness, plan for pm&r referral. \par - Likely need further imaging for eval.\par - Prn NSAIDs for pain\par \par CP\par - EKG in clinic today wnl. Patient does not have active chest pain at this time.\par - Will order stress test to r/o CAD. Cards referral given.\par \par HCM:\par - Recent mammogram, needing repeat diagnostic mammogram in 6 months.\par - PAP up to date.\par - Patient had C-scope at [facility] 5 years ago. Patient instructed to acquire report and fax to our clinic.\par - Flu shot ordered. Tdap up to date.\par - Check basic labs CBC, CMP, Lipid profile and HbA1c.

## 2019-02-18 NOTE — HISTORY OF PRESENT ILLNESS
[FreeTextEntry1] : CPE [de-identified] : 52yo F hx of chronic allergic sinusitis present for CPE this am. Patient reported persistent sinus pressure despite treatment. Patient reported feeling congested and PND chronically. Patient requested to be seen by ENT at this time. Patient denied fever, chills, ear pain or eye sx. Patient also noted chronic sciatica pain. Reported occasional RLE numbness and moderate weakness. Patient has never had imaging for evaluation. Lastly, patient reported one week ago noted left chest discomfort while resting. Patient consider this possibly related to food ingestion. However, she also reported pain related to left upper arm numbness. Patient denied cardiac hx and no palpitation noted.

## 2019-02-19 DIAGNOSIS — J30.89 OTHER ALLERGIC RHINITIS: ICD-10-CM

## 2019-02-19 DIAGNOSIS — J30.1 ALLERGIC RHINITIS DUE TO POLLEN: ICD-10-CM

## 2019-02-19 DIAGNOSIS — J30.81 ALLERGIC RHINITIS DUE TO ANIMAL (CAT) (DOG) HAIR AND DANDER: ICD-10-CM

## 2019-02-19 DIAGNOSIS — Z51.6 ENCOUNTER FOR DESENSITIZATION TO ALLERGENS: ICD-10-CM

## 2019-02-26 DIAGNOSIS — M54.30 SCIATICA, UNSPECIFIED SIDE: ICD-10-CM

## 2019-02-26 DIAGNOSIS — J30.9 ALLERGIC RHINITIS, UNSPECIFIED: ICD-10-CM

## 2019-02-26 DIAGNOSIS — J34.89 OTHER SPECIFIED DISORDERS OF NOSE AND NASAL SINUSES: ICD-10-CM

## 2019-03-01 ENCOUNTER — OUTPATIENT (OUTPATIENT)
Dept: OUTPATIENT SERVICES | Facility: HOSPITAL | Age: 54
LOS: 1 days | End: 2019-03-01
Payer: SELF-PAY

## 2019-03-01 ENCOUNTER — APPOINTMENT (OUTPATIENT)
Dept: PEDIATRIC ALLERGY IMMUNOLOGY | Facility: CLINIC | Age: 54
End: 2019-03-01
Payer: COMMERCIAL

## 2019-03-01 DIAGNOSIS — J30.9 ALLERGIC RHINITIS, UNSPECIFIED: ICD-10-CM

## 2019-03-01 PROCEDURE — 95165 ANTIGEN THERAPY SERVICES: CPT

## 2019-03-01 PROCEDURE — 95165 ANTIGEN THERAPY SERVICES: CPT | Mod: NC

## 2019-03-01 PROCEDURE — 95117 IMMUNOTHERAPY INJECTIONS: CPT

## 2019-03-01 PROCEDURE — 95117 IMMUNOTHERAPY INJECTIONS: CPT | Mod: NC

## 2019-03-07 DIAGNOSIS — Z51.6 ENCOUNTER FOR DESENSITIZATION TO ALLERGENS: ICD-10-CM

## 2019-03-07 DIAGNOSIS — J30.81 ALLERGIC RHINITIS DUE TO ANIMAL (CAT) (DOG) HAIR AND DANDER: ICD-10-CM

## 2019-03-07 DIAGNOSIS — J30.89 OTHER ALLERGIC RHINITIS: ICD-10-CM

## 2019-03-07 DIAGNOSIS — J30.1 ALLERGIC RHINITIS DUE TO POLLEN: ICD-10-CM

## 2019-03-15 ENCOUNTER — APPOINTMENT (OUTPATIENT)
Dept: PEDIATRIC ALLERGY IMMUNOLOGY | Facility: CLINIC | Age: 54
End: 2019-03-15

## 2019-03-21 LAB
ALBUMIN SERPL ELPH-MCNC: 4.4 G/DL
ALP BLD-CCNC: 56 U/L
ALT SERPL-CCNC: 21 U/L
ANION GAP SERPL CALC-SCNC: 11 MMOL/L
AST SERPL-CCNC: 24 U/L
BASOPHILS # BLD AUTO: 0.02 K/UL
BASOPHILS NFR BLD AUTO: 0.4 %
BILIRUB SERPL-MCNC: 0.3 MG/DL
BUN SERPL-MCNC: 18 MG/DL
CALCIUM SERPL-MCNC: 9.7 MG/DL
CHLORIDE SERPL-SCNC: 105 MMOL/L
CHOLEST SERPL-MCNC: 177 MG/DL
CHOLEST/HDLC SERPL: 3.2 RATIO
CO2 SERPL-SCNC: 27 MMOL/L
CREAT SERPL-MCNC: 0.88 MG/DL
EOSINOPHIL # BLD AUTO: 0.06 K/UL
EOSINOPHIL NFR BLD AUTO: 1.1 %
GLUCOSE SERPL-MCNC: 86 MG/DL
HBA1C MFR BLD HPLC: 5.3 %
HCT VFR BLD CALC: 40.4 %
HDLC SERPL-MCNC: 56 MG/DL
HGB BLD-MCNC: 12.1 G/DL
IMM GRANULOCYTES NFR BLD AUTO: 0.2 %
LDLC SERPL CALC-MCNC: 113 MG/DL
LYMPHOCYTES # BLD AUTO: 2.15 K/UL
LYMPHOCYTES NFR BLD AUTO: 41 %
MAN DIFF?: NORMAL
MCHC RBC-ENTMCNC: 27.8 PG
MCHC RBC-ENTMCNC: 30 GM/DL
MCV RBC AUTO: 92.7 FL
MONOCYTES # BLD AUTO: 0.3 K/UL
MONOCYTES NFR BLD AUTO: 5.7 %
NEUTROPHILS # BLD AUTO: 2.7 K/UL
NEUTROPHILS NFR BLD AUTO: 51.6 %
PLATELET # BLD AUTO: 318 K/UL
POTASSIUM SERPL-SCNC: 4.6 MMOL/L
PROT SERPL-MCNC: 8.1 G/DL
RBC # BLD: 4.36 M/UL
RBC # FLD: 14 %
SODIUM SERPL-SCNC: 143 MMOL/L
TRIGL SERPL-MCNC: 39 MG/DL
WBC # FLD AUTO: 5.24 K/UL

## 2019-03-22 ENCOUNTER — OUTPATIENT (OUTPATIENT)
Dept: OUTPATIENT SERVICES | Facility: HOSPITAL | Age: 54
LOS: 1 days | End: 2019-03-22
Payer: SELF-PAY

## 2019-03-22 ENCOUNTER — APPOINTMENT (OUTPATIENT)
Dept: PEDIATRIC ALLERGY IMMUNOLOGY | Facility: CLINIC | Age: 54
End: 2019-03-22
Payer: COMMERCIAL

## 2019-03-22 DIAGNOSIS — J30.9 ALLERGIC RHINITIS, UNSPECIFIED: ICD-10-CM

## 2019-03-22 PROCEDURE — 95165 ANTIGEN THERAPY SERVICES: CPT | Mod: NC

## 2019-03-22 PROCEDURE — 95117 IMMUNOTHERAPY INJECTIONS: CPT

## 2019-03-22 PROCEDURE — 95117 IMMUNOTHERAPY INJECTIONS: CPT | Mod: NC

## 2019-03-22 PROCEDURE — 95165 ANTIGEN THERAPY SERVICES: CPT

## 2019-03-26 DIAGNOSIS — J30.81 ALLERGIC RHINITIS DUE TO ANIMAL (CAT) (DOG) HAIR AND DANDER: ICD-10-CM

## 2019-03-26 DIAGNOSIS — Z51.6 ENCOUNTER FOR DESENSITIZATION TO ALLERGENS: ICD-10-CM

## 2019-03-26 DIAGNOSIS — J30.1 ALLERGIC RHINITIS DUE TO POLLEN: ICD-10-CM

## 2019-03-26 DIAGNOSIS — J30.89 OTHER ALLERGIC RHINITIS: ICD-10-CM

## 2019-04-12 ENCOUNTER — APPOINTMENT (OUTPATIENT)
Age: 54
End: 2019-04-12

## 2019-04-12 ENCOUNTER — APPOINTMENT (OUTPATIENT)
Dept: PEDIATRIC ALLERGY IMMUNOLOGY | Facility: CLINIC | Age: 54
End: 2019-04-12
Payer: COMMERCIAL

## 2019-04-12 ENCOUNTER — OUTPATIENT (OUTPATIENT)
Dept: OUTPATIENT SERVICES | Facility: HOSPITAL | Age: 54
LOS: 1 days | End: 2019-04-12
Payer: SELF-PAY

## 2019-04-12 DIAGNOSIS — J30.9 ALLERGIC RHINITIS, UNSPECIFIED: ICD-10-CM

## 2019-04-12 PROCEDURE — 95165 ANTIGEN THERAPY SERVICES: CPT | Mod: NC

## 2019-04-12 PROCEDURE — 95165 ANTIGEN THERAPY SERVICES: CPT

## 2019-04-12 PROCEDURE — 95117 IMMUNOTHERAPY INJECTIONS: CPT

## 2019-04-12 PROCEDURE — 95117 IMMUNOTHERAPY INJECTIONS: CPT | Mod: NC

## 2019-04-15 DIAGNOSIS — J30.1 ALLERGIC RHINITIS DUE TO POLLEN: ICD-10-CM

## 2019-04-15 DIAGNOSIS — J30.89 OTHER ALLERGIC RHINITIS: ICD-10-CM

## 2019-04-15 DIAGNOSIS — J30.81 ALLERGIC RHINITIS DUE TO ANIMAL (CAT) (DOG) HAIR AND DANDER: ICD-10-CM

## 2019-04-15 DIAGNOSIS — Z51.6 ENCOUNTER FOR DESENSITIZATION TO ALLERGENS: ICD-10-CM

## 2019-05-10 ENCOUNTER — APPOINTMENT (OUTPATIENT)
Dept: PEDIATRIC ALLERGY IMMUNOLOGY | Facility: CLINIC | Age: 54
End: 2019-05-10
Payer: COMMERCIAL

## 2019-05-10 ENCOUNTER — OUTPATIENT (OUTPATIENT)
Dept: OUTPATIENT SERVICES | Facility: HOSPITAL | Age: 54
LOS: 1 days | End: 2019-05-10
Payer: SELF-PAY

## 2019-05-10 DIAGNOSIS — J30.9 ALLERGIC RHINITIS, UNSPECIFIED: ICD-10-CM

## 2019-05-10 PROCEDURE — 95165 ANTIGEN THERAPY SERVICES: CPT

## 2019-05-10 PROCEDURE — 95165 ANTIGEN THERAPY SERVICES: CPT | Mod: NC

## 2019-05-10 PROCEDURE — 95117 IMMUNOTHERAPY INJECTIONS: CPT

## 2019-05-10 PROCEDURE — 95117 IMMUNOTHERAPY INJECTIONS: CPT | Mod: NC

## 2019-05-13 DIAGNOSIS — J30.81 ALLERGIC RHINITIS DUE TO ANIMAL (CAT) (DOG) HAIR AND DANDER: ICD-10-CM

## 2019-05-13 DIAGNOSIS — J30.89 OTHER ALLERGIC RHINITIS: ICD-10-CM

## 2019-05-13 DIAGNOSIS — J30.1 ALLERGIC RHINITIS DUE TO POLLEN: ICD-10-CM

## 2019-05-13 DIAGNOSIS — Z51.6 ENCOUNTER FOR DESENSITIZATION TO ALLERGENS: ICD-10-CM

## 2019-06-03 ENCOUNTER — OTHER (OUTPATIENT)
Age: 54
End: 2019-06-03

## 2019-06-07 ENCOUNTER — OUTPATIENT (OUTPATIENT)
Dept: OUTPATIENT SERVICES | Facility: HOSPITAL | Age: 54
LOS: 1 days | End: 2019-06-07
Payer: SELF-PAY

## 2019-06-07 ENCOUNTER — APPOINTMENT (OUTPATIENT)
Dept: PEDIATRIC ALLERGY IMMUNOLOGY | Facility: CLINIC | Age: 54
End: 2019-06-07
Payer: COMMERCIAL

## 2019-06-07 DIAGNOSIS — J30.9 ALLERGIC RHINITIS, UNSPECIFIED: ICD-10-CM

## 2019-06-07 PROCEDURE — 95117 IMMUNOTHERAPY INJECTIONS: CPT | Mod: NC

## 2019-06-07 PROCEDURE — 95117 IMMUNOTHERAPY INJECTIONS: CPT

## 2019-06-07 PROCEDURE — 95165 ANTIGEN THERAPY SERVICES: CPT

## 2019-06-07 PROCEDURE — 95165 ANTIGEN THERAPY SERVICES: CPT | Mod: NC

## 2019-06-10 DIAGNOSIS — J30.81 ALLERGIC RHINITIS DUE TO ANIMAL (CAT) (DOG) HAIR AND DANDER: ICD-10-CM

## 2019-06-10 DIAGNOSIS — J30.89 OTHER ALLERGIC RHINITIS: ICD-10-CM

## 2019-06-10 DIAGNOSIS — Z51.6 ENCOUNTER FOR DESENSITIZATION TO ALLERGENS: ICD-10-CM

## 2019-06-10 DIAGNOSIS — J30.1 ALLERGIC RHINITIS DUE TO POLLEN: ICD-10-CM

## 2019-07-10 ENCOUNTER — OUTPATIENT (OUTPATIENT)
Dept: OUTPATIENT SERVICES | Facility: HOSPITAL | Age: 54
LOS: 1 days | End: 2019-07-10
Payer: SELF-PAY

## 2019-07-10 ENCOUNTER — APPOINTMENT (OUTPATIENT)
Dept: PEDIATRIC ALLERGY IMMUNOLOGY | Facility: CLINIC | Age: 54
End: 2019-07-10
Payer: COMMERCIAL

## 2019-07-10 DIAGNOSIS — J30.9 ALLERGIC RHINITIS, UNSPECIFIED: ICD-10-CM

## 2019-07-10 PROCEDURE — 95117 IMMUNOTHERAPY INJECTIONS: CPT | Mod: NC

## 2019-07-10 PROCEDURE — 95165 ANTIGEN THERAPY SERVICES: CPT

## 2019-07-10 PROCEDURE — 95117 IMMUNOTHERAPY INJECTIONS: CPT

## 2019-07-10 PROCEDURE — 95165 ANTIGEN THERAPY SERVICES: CPT | Mod: NC

## 2019-07-22 DIAGNOSIS — J30.81 ALLERGIC RHINITIS DUE TO ANIMAL (CAT) (DOG) HAIR AND DANDER: ICD-10-CM

## 2019-07-22 DIAGNOSIS — Z51.6 ENCOUNTER FOR DESENSITIZATION TO ALLERGENS: ICD-10-CM

## 2019-07-22 DIAGNOSIS — J30.1 ALLERGIC RHINITIS DUE TO POLLEN: ICD-10-CM

## 2019-07-22 DIAGNOSIS — J30.89 OTHER ALLERGIC RHINITIS: ICD-10-CM

## 2019-07-29 ENCOUNTER — FORM ENCOUNTER (OUTPATIENT)
Age: 54
End: 2019-07-29

## 2019-07-30 ENCOUNTER — APPOINTMENT (OUTPATIENT)
Dept: ULTRASOUND IMAGING | Facility: IMAGING CENTER | Age: 54
End: 2019-07-30

## 2019-07-30 ENCOUNTER — APPOINTMENT (OUTPATIENT)
Dept: MAMMOGRAPHY | Facility: IMAGING CENTER | Age: 54
End: 2019-07-30
Payer: COMMERCIAL

## 2019-07-30 ENCOUNTER — OUTPATIENT (OUTPATIENT)
Dept: OUTPATIENT SERVICES | Facility: HOSPITAL | Age: 54
LOS: 1 days | End: 2019-07-30

## 2019-07-30 DIAGNOSIS — J30.81 ALLERGIC RHINITIS DUE TO ANIMAL (CAT) (DOG) HAIR AND DANDER: ICD-10-CM

## 2019-07-30 DIAGNOSIS — Z51.6 ENCOUNTER FOR DESENSITIZATION TO ALLERGENS: ICD-10-CM

## 2019-07-30 DIAGNOSIS — J30.89 OTHER ALLERGIC RHINITIS: ICD-10-CM

## 2019-07-30 DIAGNOSIS — J30.1 ALLERGIC RHINITIS DUE TO POLLEN: ICD-10-CM

## 2019-07-30 PROCEDURE — G0279: CPT | Mod: 26

## 2019-07-30 PROCEDURE — 77065 DX MAMMO INCL CAD UNI: CPT | Mod: 26,LT

## 2019-08-01 ENCOUNTER — APPOINTMENT (OUTPATIENT)
Dept: OPHTHALMOLOGY | Facility: CLINIC | Age: 54
End: 2019-08-01

## 2019-08-07 ENCOUNTER — OUTPATIENT (OUTPATIENT)
Dept: OUTPATIENT SERVICES | Facility: HOSPITAL | Age: 54
LOS: 1 days | End: 2019-08-07
Payer: SELF-PAY

## 2019-08-07 ENCOUNTER — APPOINTMENT (OUTPATIENT)
Dept: PEDIATRIC ALLERGY IMMUNOLOGY | Facility: CLINIC | Age: 54
End: 2019-08-07
Payer: COMMERCIAL

## 2019-08-07 DIAGNOSIS — J30.9 ALLERGIC RHINITIS, UNSPECIFIED: ICD-10-CM

## 2019-08-07 PROCEDURE — 95117 IMMUNOTHERAPY INJECTIONS: CPT | Mod: NC,GC

## 2019-08-07 PROCEDURE — 95165 ANTIGEN THERAPY SERVICES: CPT

## 2019-08-07 PROCEDURE — 95165 ANTIGEN THERAPY SERVICES: CPT | Mod: NC,GC

## 2019-08-07 PROCEDURE — 95117 IMMUNOTHERAPY INJECTIONS: CPT

## 2019-08-22 DIAGNOSIS — J30.81 ALLERGIC RHINITIS DUE TO ANIMAL (CAT) (DOG) HAIR AND DANDER: ICD-10-CM

## 2019-08-22 DIAGNOSIS — J30.1 ALLERGIC RHINITIS DUE TO POLLEN: ICD-10-CM

## 2019-08-22 DIAGNOSIS — J30.89 OTHER ALLERGIC RHINITIS: ICD-10-CM

## 2019-08-22 DIAGNOSIS — Z51.6 ENCOUNTER FOR DESENSITIZATION TO ALLERGENS: ICD-10-CM

## 2019-09-11 ENCOUNTER — APPOINTMENT (OUTPATIENT)
Dept: PEDIATRIC ALLERGY IMMUNOLOGY | Facility: CLINIC | Age: 54
End: 2019-09-11
Payer: COMMERCIAL

## 2019-09-11 ENCOUNTER — OUTPATIENT (OUTPATIENT)
Dept: OUTPATIENT SERVICES | Facility: HOSPITAL | Age: 54
LOS: 1 days | End: 2019-09-11
Payer: SELF-PAY

## 2019-09-11 PROCEDURE — 95165 ANTIGEN THERAPY SERVICES: CPT | Mod: NC

## 2019-09-11 PROCEDURE — 95117 IMMUNOTHERAPY INJECTIONS: CPT | Mod: NC

## 2019-09-11 PROCEDURE — 95117 IMMUNOTHERAPY INJECTIONS: CPT

## 2019-09-11 PROCEDURE — 95165 ANTIGEN THERAPY SERVICES: CPT

## 2019-09-16 DIAGNOSIS — J30.1 ALLERGIC RHINITIS DUE TO POLLEN: ICD-10-CM

## 2019-09-16 DIAGNOSIS — J30.89 OTHER ALLERGIC RHINITIS: ICD-10-CM

## 2019-09-16 DIAGNOSIS — J30.81 ALLERGIC RHINITIS DUE TO ANIMAL (CAT) (DOG) HAIR AND DANDER: ICD-10-CM

## 2019-09-16 DIAGNOSIS — Z51.6 ENCOUNTER FOR DESENSITIZATION TO ALLERGENS: ICD-10-CM

## 2019-10-02 ENCOUNTER — APPOINTMENT (OUTPATIENT)
Dept: PEDIATRIC ALLERGY IMMUNOLOGY | Facility: CLINIC | Age: 54
End: 2019-10-02

## 2019-10-11 ENCOUNTER — LABORATORY RESULT (OUTPATIENT)
Age: 54
End: 2019-10-11

## 2019-10-11 ENCOUNTER — OUTPATIENT (OUTPATIENT)
Dept: OUTPATIENT SERVICES | Facility: HOSPITAL | Age: 54
LOS: 1 days | End: 2019-10-11
Payer: SELF-PAY

## 2019-10-11 ENCOUNTER — APPOINTMENT (OUTPATIENT)
Dept: PEDIATRIC ALLERGY IMMUNOLOGY | Facility: CLINIC | Age: 54
End: 2019-10-11
Payer: COMMERCIAL

## 2019-10-11 ENCOUNTER — APPOINTMENT (OUTPATIENT)
Dept: INTERNAL MEDICINE | Facility: CLINIC | Age: 54
End: 2019-10-11
Payer: COMMERCIAL

## 2019-10-11 ENCOUNTER — RESULT CHARGE (OUTPATIENT)
Age: 54
End: 2019-10-11

## 2019-10-11 VITALS
DIASTOLIC BLOOD PRESSURE: 80 MMHG | WEIGHT: 201 LBS | HEIGHT: 67 IN | SYSTOLIC BLOOD PRESSURE: 126 MMHG | OXYGEN SATURATION: 99 % | HEART RATE: 51 BPM | BODY MASS INDEX: 31.55 KG/M2

## 2019-10-11 DIAGNOSIS — I10 ESSENTIAL (PRIMARY) HYPERTENSION: ICD-10-CM

## 2019-10-11 DIAGNOSIS — T78.1XXD OTHER ADVERSE FOOD REACTIONS, NOT ELSEWHERE CLASSIFIED, SUBSEQUENT ENCOUNTER: ICD-10-CM

## 2019-10-11 DIAGNOSIS — J30.9 ALLERGIC RHINITIS, UNSPECIFIED: ICD-10-CM

## 2019-10-11 DIAGNOSIS — J34.89 OTHER SPECIFIED DISORDERS OF NOSE AND NASAL SINUSES: ICD-10-CM

## 2019-10-11 DIAGNOSIS — Z87.19 PERSONAL HISTORY OF OTHER DISEASES OF THE DIGESTIVE SYSTEM: ICD-10-CM

## 2019-10-11 LAB
BILIRUB UR QL STRIP: NORMAL
CLARITY UR: NORMAL
COLLECTION METHOD: NORMAL
GLUCOSE UR-MCNC: NORMAL
HCG UR QL: 0.2 EU/DL
HGB UR QL STRIP.AUTO: NORMAL
KETONES UR-MCNC: NORMAL
LEUKOCYTE ESTERASE UR QL STRIP: NORMAL
NITRITE UR QL STRIP: NORMAL
PH UR STRIP: 8.5
PROT UR STRIP-MCNC: NORMAL
SP GR UR STRIP: 1.01

## 2019-10-11 PROCEDURE — 95165 ANTIGEN THERAPY SERVICES: CPT

## 2019-10-11 PROCEDURE — 99213 OFFICE O/P EST LOW 20 MIN: CPT | Mod: NC,25,GC

## 2019-10-11 PROCEDURE — 95165 ANTIGEN THERAPY SERVICES: CPT | Mod: NC,GC

## 2019-10-11 PROCEDURE — G0463: CPT | Mod: 25

## 2019-10-11 PROCEDURE — 95117 IMMUNOTHERAPY INJECTIONS: CPT | Mod: NC,GC

## 2019-10-11 PROCEDURE — 87186 SC STD MICRODIL/AGAR DIL: CPT

## 2019-10-11 PROCEDURE — 81003 URINALYSIS AUTO W/O SCOPE: CPT

## 2019-10-11 PROCEDURE — 87086 URINE CULTURE/COLONY COUNT: CPT

## 2019-10-11 PROCEDURE — 95117 IMMUNOTHERAPY INJECTIONS: CPT

## 2019-10-11 NOTE — PHYSICAL EXAM
[Alert] : alert [Well Nourished] : well nourished [Healthy Appearance] : healthy appearance [No Acute Distress] : no acute distress [Well Developed] : well developed [Normal Pupil & Iris Size/Symmetry] : normal pupil and iris size and symmetry [No Discharge] : no discharge [No Photophobia] : no photophobia [Sclera Not Icteric] : sclera not icteric [Suborbital Bogginess] : suborbital bogginess (allergic shiners) [Normal TMs] : both tympanic membranes were normal [Normal Lips/Tongue] : the lips and tongue were normal [Normal Outer Ear/Nose] : the ears and nose were normal in appearance [Normal Tonsils] : normal tonsils [No Thrush] : no thrush [Boggy Nasal Turbinates] : boggy and/or pale nasal turbinates [Clear Rhinorrhea] : clear rhinorrhea was seen [Supple] : the neck was supple [Normal Rate and Effort] : normal respiratory rhythm and effort [No Crackles] : no crackles [Bilateral Audible Breath Sounds] : bilateral audible breath sounds [Normal Rate] : heart rate was normal  [Normal S1, S2] : normal S1 and S2 [No murmur] : no murmur [Regular Rhythm] : with a regular rhythm [Normal Cervical Lymph Nodes] : cervical [Skin Intact] : skin intact  [No Rash] : no rash [No Skin Lesions] : no skin lesions [Normal Mood] : mood was normal [Normal Affect] : affect was normal [Alert, Awake, Oriented as Age-Appropriate] : alert, awake, oriented as age appropriate [Conjunctival Erythema] : no conjunctival erythema [Pharyngeal erythema] : no pharyngeal erythema [Exudate] : no exudate [Posterior Pharyngeal Cobblestoning] : no posterior pharyngeal cobblestoning [Wheezing] : no wheezing was heard [Eczematous Patches] : no eczematous patches [Xerosis] : no xerosis [de-identified] : Sinus tenderness noted on palpation of maxillary sinus

## 2019-10-11 NOTE — PHYSICAL EXAM
[de-identified] : full range of motion, 5/5 strength of right hion abduction, flexion, and extension. Negative straight leg test. normal ambulation. No tenderness to palpation of hip joint. No tenderness to palpation of spine.

## 2019-10-11 NOTE — HISTORY OF PRESENT ILLNESS
[de-identified] : 54 year old woman with allergic rhinitis who presented for IT today, as well as her annual follow up appointment. \par \par Allergic rhinitis now on IT: She began IT in 2017 and reached maintenance in July 2018 but then was away in [place] and had to rebuild this year. She reached maintenance in January 2019. Since being on IT she states her seasonal symptoms have significantly improved with less rhinitis and less ocular itching. She has never had any more than a local reaction to IT and takes cetirizine before injections.\par \par Nasal Congestion: She rinses daily with Neilmed but has run out of azelastine. She uses Azelastine as needed and finds it very helpful. Since she ran out she has had increased congestion. \par \par oral Allergy: She avoids most melons, avocados, apples and peaches. She prefers to cut them out entirely and is not interested in baked/canned forms.\par \par Peanut allergy: carries an Epipen. Avoids peanut and tree nut and Has not had any accidental exposure. last blood test in 2017 was negative to nuts and peanuts but skin test positive o peanut, almond.

## 2019-10-11 NOTE — HISTORY OF PRESENT ILLNESS
[FreeTextEntry8] : Patient is a 54 y.o F w/ PMH chronic allergies sinusitis who presents for an acute visit for "foul smelling urine". She denies any dysuria or hematuria. Her symptoms first started in July but as advised by her sister who is an RN tdrink more water. She found that her symptoms went away for a short while but returned in August-September. She is concerned that she has a UTI. She denies any fever/chills, sick contacts. She is sexually active with one partner but does not use contraceptives. \par \par She also complains of right sided hip/back pain that radiates down her right leg. Her symptoms worsen w/ physical exertion. She works as a personal care taker. Recently, her job requires her to lift heavier objects and patients which seems to be worsening her pain. She endorses a hx of sciatica. She is taking NSAIDs for pain and requires about 2 pills twice a week\par \par She also endorses constipation that she had for years. She has a BM once a week. She denies any abdominal pain but admits to bloating. Her last BM was the day before yesterday.

## 2019-10-11 NOTE — CONSULT LETTER
[Dear  ___] : Dear  [unfilled], [Consult Letter:] : I had the pleasure of evaluating your patient, [unfilled]. [Please see my note below.] : Please see my note below. [Consult Closing:] : Thank you very much for allowing me to participate in the care of this patient.  If you have any questions, please do not hesitate to contact me. [Sincerely,] : Sincerely, [FreeTextEntry3] : Farida Reyna MD PhD\par Allergy Immunology Fellow\par [facility] \par \par Robles Gupta III  MPH, MD, PhD, FACP, FACAAI, FAAAAI \par , Departments of Medicine and Pediatrics \par [place] [facility] [employer] [facility] \par , [facility] [employer] [facility] [employer] [facility] [employer] \par Attending Physician, Division of Allergy & Immunology [facility]\par \par \par

## 2019-10-11 NOTE — REVIEW OF SYSTEMS
[Rhinorrhea] : rhinorrhea [Nasal Congestion] : nasal congestion [Post Nasal Drip] : post nasal drip [Nl] : Endocrine [Cough] : no cough [Recurrent Sinus Infections] : no recurrent sinus infections [Recurrent Bronchitis] : no recurrent bronchitis

## 2019-10-11 NOTE — PLAN
[FreeTextEntry1] : Patient is a 54 y.o F w/ PMH chronic allergies sinusitis who presents for an acute visit for "foul smelling urine" concerning for UTI. Also c/o sciatica and chronic constipation. \par \par #UTI\par -symptoms of foul smelling urine, no dysuria or hematuria \par -POCT UA +nitrites; will send UCX \par -Sent 5 days of macrobid for UTI \par -advised patient if develop fever and/or CVA tenderness to go to the ED; expressed understanding \par \par #Sciatica\par -Symptoms consistent w/ sciatica. \par -Given PT referral\par -Prescribe meloxicam 15 mg PO daily for 2 weeks duration; advised to take w/ food \par \par #Chronic constipation \par -reports one BM per week\par -prescribed miralax\par -advised to increase fiber intake \par \par \par #health maintenance\par -flu shot administered \par D/W Dr. Fuller\par RTC in Feb for annual physica

## 2019-10-11 NOTE — REVIEW OF SYSTEMS
[Dysuria] : no dysuria [Hematuria] : no hematuria [Frequency] : no frequency [Muscle Weakness] : no muscle weakness [FreeTextEntry8] : foul smelling urine  [FreeTextEntry9] : radiation down right leg up to knee

## 2019-10-12 LAB
APPEARANCE UR: ABNORMAL
BILIRUB UR-MCNC: NEGATIVE — SIGNIFICANT CHANGE UP
COLOR SPEC: SIGNIFICANT CHANGE UP
DIFF PNL FLD: NEGATIVE — SIGNIFICANT CHANGE UP
GLUCOSE UR QL: NEGATIVE — SIGNIFICANT CHANGE UP
KETONES UR-MCNC: NEGATIVE — SIGNIFICANT CHANGE UP
LEUKOCYTE ESTERASE UR-ACNC: NEGATIVE — SIGNIFICANT CHANGE UP
NITRITE UR-MCNC: POSITIVE
PH UR: 8.5 — HIGH (ref 5–8)
PROT UR-MCNC: NEGATIVE — SIGNIFICANT CHANGE UP
SP GR SPEC: 1.01 — SIGNIFICANT CHANGE UP (ref 1.01–1.02)
UROBILINOGEN FLD QL: SIGNIFICANT CHANGE UP

## 2019-10-23 DIAGNOSIS — R82.90 UNSPECIFIED ABNORMAL FINDINGS IN URINE: ICD-10-CM

## 2019-10-23 DIAGNOSIS — N39.0 URINARY TRACT INFECTION, SITE NOT SPECIFIED: ICD-10-CM

## 2019-10-23 DIAGNOSIS — M54.30 SCIATICA, UNSPECIFIED SIDE: ICD-10-CM

## 2019-10-24 ENCOUNTER — APPOINTMENT (OUTPATIENT)
Dept: OPHTHALMOLOGY | Facility: CLINIC | Age: 54
End: 2019-10-24

## 2019-10-24 DIAGNOSIS — J34.89 OTHER SPECIFIED DISORDERS OF NOSE AND NASAL SINUSES: ICD-10-CM

## 2019-10-24 DIAGNOSIS — Z51.6 ENCOUNTER FOR DESENSITIZATION TO ALLERGENS: ICD-10-CM

## 2019-10-24 DIAGNOSIS — Z91.010 ALLERGY TO PEANUTS: ICD-10-CM

## 2019-10-24 DIAGNOSIS — T78.1XXD OTHER ADVERSE FOOD REACTIONS, NOT ELSEWHERE CLASSIFIED, SUBSEQUENT ENCOUNTER: ICD-10-CM

## 2019-10-24 DIAGNOSIS — J30.1 ALLERGIC RHINITIS DUE TO POLLEN: ICD-10-CM

## 2019-10-24 DIAGNOSIS — J30.81 ALLERGIC RHINITIS DUE TO ANIMAL (CAT) (DOG) HAIR AND DANDER: ICD-10-CM

## 2019-10-24 DIAGNOSIS — J30.89 OTHER ALLERGIC RHINITIS: ICD-10-CM

## 2019-11-20 ENCOUNTER — OUTPATIENT (OUTPATIENT)
Dept: OUTPATIENT SERVICES | Facility: HOSPITAL | Age: 54
LOS: 1 days | End: 2019-11-20
Payer: SELF-PAY

## 2019-11-20 ENCOUNTER — APPOINTMENT (OUTPATIENT)
Dept: PEDIATRIC ALLERGY IMMUNOLOGY | Facility: CLINIC | Age: 54
End: 2019-11-20
Payer: COMMERCIAL

## 2019-11-20 DIAGNOSIS — J30.9 ALLERGIC RHINITIS, UNSPECIFIED: ICD-10-CM

## 2019-11-20 PROCEDURE — 95117 IMMUNOTHERAPY INJECTIONS: CPT

## 2019-11-20 PROCEDURE — 95165 ANTIGEN THERAPY SERVICES: CPT

## 2019-11-20 PROCEDURE — 95117 IMMUNOTHERAPY INJECTIONS: CPT | Mod: NC,GC

## 2019-11-20 PROCEDURE — 95165 ANTIGEN THERAPY SERVICES: CPT | Mod: NC,GC

## 2019-11-22 ENCOUNTER — APPOINTMENT (OUTPATIENT)
Dept: INTERNAL MEDICINE | Facility: CLINIC | Age: 54
End: 2019-11-22

## 2019-11-26 DIAGNOSIS — J30.1 ALLERGIC RHINITIS DUE TO POLLEN: ICD-10-CM

## 2019-11-26 DIAGNOSIS — J30.81 ALLERGIC RHINITIS DUE TO ANIMAL (CAT) (DOG) HAIR AND DANDER: ICD-10-CM

## 2019-11-26 DIAGNOSIS — Z51.6 ENCOUNTER FOR DESENSITIZATION TO ALLERGENS: ICD-10-CM

## 2019-11-26 DIAGNOSIS — J30.89 OTHER ALLERGIC RHINITIS: ICD-10-CM

## 2019-12-18 ENCOUNTER — APPOINTMENT (OUTPATIENT)
Dept: INTERNAL MEDICINE | Facility: CLINIC | Age: 54
End: 2019-12-18

## 2020-01-02 ENCOUNTER — APPOINTMENT (OUTPATIENT)
Dept: OPHTHALMOLOGY | Facility: CLINIC | Age: 55
End: 2020-01-02

## 2020-01-07 ENCOUNTER — LABORATORY RESULT (OUTPATIENT)
Age: 55
End: 2020-01-07

## 2020-01-08 ENCOUNTER — OUTPATIENT (OUTPATIENT)
Dept: OUTPATIENT SERVICES | Facility: HOSPITAL | Age: 55
LOS: 1 days | End: 2020-01-08
Payer: SELF-PAY

## 2020-01-08 ENCOUNTER — APPOINTMENT (OUTPATIENT)
Dept: INTERNAL MEDICINE | Facility: CLINIC | Age: 55
End: 2020-01-08

## 2020-01-08 VITALS
DIASTOLIC BLOOD PRESSURE: 70 MMHG | WEIGHT: 209 LBS | SYSTOLIC BLOOD PRESSURE: 110 MMHG | HEIGHT: 67 IN | BODY MASS INDEX: 32.8 KG/M2

## 2020-01-08 DIAGNOSIS — I10 ESSENTIAL (PRIMARY) HYPERTENSION: ICD-10-CM

## 2020-01-08 DIAGNOSIS — M25.571 PAIN IN RIGHT ANKLE AND JOINTS OF RIGHT FOOT: ICD-10-CM

## 2020-01-08 PROCEDURE — 85027 COMPLETE CBC AUTOMATED: CPT

## 2020-01-08 PROCEDURE — 80053 COMPREHEN METABOLIC PANEL: CPT

## 2020-01-08 PROCEDURE — G0463: CPT

## 2020-01-08 RX ORDER — METRONIDAZOLE 7.5 MG/G
0.75 GEL VAGINAL
Qty: 1 | Refills: 3 | Status: DISCONTINUED | COMMUNITY
Start: 2017-11-17 | End: 2020-01-08

## 2020-01-08 RX ORDER — FLUCONAZOLE 150 MG/1
150 TABLET ORAL
Qty: 1 | Refills: 1 | Status: DISCONTINUED | COMMUNITY
Start: 2017-09-08 | End: 2020-01-08

## 2020-01-08 RX ORDER — FLUCONAZOLE 150 MG/1
150 TABLET ORAL
Qty: 1 | Refills: 3 | Status: DISCONTINUED | COMMUNITY
Start: 2017-11-17 | End: 2020-01-08

## 2020-01-08 RX ORDER — NITROFURANTOIN MACROCRYSTALS 100 MG/1
100 CAPSULE ORAL
Qty: 14 | Refills: 0 | Status: DISCONTINUED | COMMUNITY
Start: 2018-12-18 | End: 2020-01-08

## 2020-01-08 RX ORDER — NITROFURANTOIN (MONOHYDRATE/MACROCRYSTALS) 25; 75 MG/1; MG/1
100 CAPSULE ORAL
Qty: 10 | Refills: 0 | Status: DISCONTINUED | COMMUNITY
Start: 2019-10-11 | End: 2020-01-08

## 2020-01-08 RX ORDER — METRONIDAZOLE 7.5 MG/G
0.75 GEL VAGINAL
Qty: 2 | Refills: 0 | Status: DISCONTINUED | COMMUNITY
Start: 2017-09-08 | End: 2020-01-08

## 2020-01-08 RX ORDER — POLYETHYLENE GLYCOL 3350 17 G/17G
17 POWDER, FOR SOLUTION ORAL DAILY
Qty: 14 | Refills: 0 | Status: DISCONTINUED | COMMUNITY
Start: 2019-10-11 | End: 2020-01-08

## 2020-01-08 RX ORDER — NYSTATIN AND TRIAMCINOLONE ACETONIDE 100000; 1 [USP'U]/G; MG/G
100000-0.1 OINTMENT TOPICAL TWICE DAILY
Qty: 1 | Refills: 0 | Status: DISCONTINUED | COMMUNITY
Start: 2017-09-08 | End: 2020-01-08

## 2020-01-09 PROBLEM — M25.571 ACUTE RIGHT ANKLE PAIN: Status: ACTIVE | Noted: 2020-01-08

## 2020-01-09 LAB
ALBUMIN SERPL ELPH-MCNC: 4.7 G/DL — SIGNIFICANT CHANGE UP (ref 3.3–5)
ALP SERPL-CCNC: 53 U/L — SIGNIFICANT CHANGE UP (ref 40–120)
ALT FLD-CCNC: 18 U/L — SIGNIFICANT CHANGE UP (ref 10–45)
ANION GAP SERPL CALC-SCNC: 11 MMOL/L — SIGNIFICANT CHANGE UP (ref 5–17)
AST SERPL-CCNC: 16 U/L — SIGNIFICANT CHANGE UP (ref 10–40)
BILIRUB SERPL-MCNC: 0.2 MG/DL — SIGNIFICANT CHANGE UP (ref 0.2–1.2)
BUN SERPL-MCNC: 20 MG/DL — SIGNIFICANT CHANGE UP (ref 7–23)
CALCIUM SERPL-MCNC: 9.8 MG/DL — SIGNIFICANT CHANGE UP (ref 8.4–10.5)
CHLORIDE SERPL-SCNC: 103 MMOL/L — SIGNIFICANT CHANGE UP (ref 96–108)
CO2 SERPL-SCNC: 26 MMOL/L — SIGNIFICANT CHANGE UP (ref 22–31)
CREAT SERPL-MCNC: 0.89 MG/DL — SIGNIFICANT CHANGE UP (ref 0.5–1.3)
GLUCOSE SERPL-MCNC: 91 MG/DL — SIGNIFICANT CHANGE UP (ref 70–99)
HCT VFR BLD CALC: 39.1 % — SIGNIFICANT CHANGE UP (ref 34.5–45)
HGB BLD-MCNC: 11.4 G/DL — LOW (ref 11.5–15.5)
MCHC RBC-ENTMCNC: 27.4 PG — SIGNIFICANT CHANGE UP (ref 27–34)
MCHC RBC-ENTMCNC: 29.2 GM/DL — LOW (ref 32–36)
MCV RBC AUTO: 94 FL — SIGNIFICANT CHANGE UP (ref 80–100)
PLATELET # BLD AUTO: 276 K/UL — SIGNIFICANT CHANGE UP (ref 150–400)
POTASSIUM SERPL-MCNC: 4.7 MMOL/L — SIGNIFICANT CHANGE UP (ref 3.5–5.3)
POTASSIUM SERPL-SCNC: 4.7 MMOL/L — SIGNIFICANT CHANGE UP (ref 3.5–5.3)
PROT SERPL-MCNC: 7.6 G/DL — SIGNIFICANT CHANGE UP (ref 6–8.3)
RBC # BLD: 4.16 M/UL — SIGNIFICANT CHANGE UP (ref 3.8–5.2)
RBC # FLD: 13.4 % — SIGNIFICANT CHANGE UP (ref 10.3–14.5)
SODIUM SERPL-SCNC: 140 MMOL/L — SIGNIFICANT CHANGE UP (ref 135–145)
WBC # BLD: 6.53 K/UL — SIGNIFICANT CHANGE UP (ref 3.8–10.5)
WBC # FLD AUTO: 6.53 K/UL — SIGNIFICANT CHANGE UP (ref 3.8–10.5)

## 2020-01-09 NOTE — REVIEW OF SYSTEMS
[Joint Pain] : joint pain [Headache] : headache [Fever] : no fever [Chills] : no chills [Vision Problems] : no vision problems [Recent Change In Weight] : ~T no recent weight change [Palpitations] : no palpitations [Orthopnea] : no orthopnea [Chest Pain] : no chest pain [Shortness Of Breath] : no shortness of breath [Cough] : no cough [Wheezing] : no wheezing [Nausea] : no nausea [Abdominal Pain] : no abdominal pain [Diarrhea] : diarrhea [Dysuria] : no dysuria [Vomiting] : no vomiting

## 2020-01-09 NOTE — ASSESSMENT
[FreeTextEntry1] : 54F with allergies, allergic sinusitis, sciatica x 2 years p/w ankle inversion after fall down stairs. 6 days since fall, pain improving with icing. [place] score 1. Also with complaint of worsening sciatica over last two years refractory to meloxicam. To start PT at the end of the month. No red flag symptoms.

## 2020-01-09 NOTE — PLAN
[FreeTextEntry1] : c/w w/ planned PT. Suspect simple soft tissue injury at this time. Will check kidney function, cbc, prior to initiating another course of NSAIDs. Ortho referral for f/u of refractory sciatica and ankle injury. HCV screening today, flu shot today. RTC after PT initiation and ortho appt. RIPE therapy for ankle injury d/w patient, will ice ankle, and elevate it.

## 2020-01-09 NOTE — PHYSICAL EXAM
[No Acute Distress] : no acute distress [Normal Sclera/Conjunctiva] : normal sclera/conjunctiva [PERRL] : pupils equal round and reactive to light [EOMI] : extraocular movements intact [Normal Outer Ear/Nose] : the outer ears and nose were normal in appearance [Normal Oropharynx] : the oropharynx was normal [Supple] : supple [No Respiratory Distress] : no respiratory distress  [Clear to Auscultation] : lungs were clear to auscultation bilaterally [Regular Rhythm] : with a regular rhythm [Normal Rate] : normal rate  [Normal S1, S2] : normal S1 and S2 [No Murmur] : no murmur heard [de-identified] : lateral ankle swlling with lateral maleolus tendrness to palpation, straight leg raise mildly positive  [de-identified] : Strength 5/5 LEs, gait normal, nonantalgic

## 2020-01-09 NOTE — HISTORY OF PRESENT ILLNESS
[FreeTextEntry8] : Patient is a 54 y.o F w/ PMH chronic allergies c/b allergic recurrent sinusitis on active immunotherapy who presents for an acute visit for right ankle pain. Patient has a history of sciatica diagnosed on previous office visit which was treated with a trial of meloxicam and PT referral which the patient has not-yet started. 6 days ago, the patient was at her relative's house, when she slipped down the stairs and everted her right ankle. Pain has been improving with rest, icing, but still there. No fevers/chills, denies LE weakness. Sciatica a/w back pain in the lower back, especially on the right. Otherwise ROS negative, denies cp/sob. No reduced sensation in LEs.\par

## 2020-01-10 LAB
HCV AB SER QL: NONREACTIVE
HCV S/CO RATIO: 0.1 S/CO

## 2020-01-15 ENCOUNTER — APPOINTMENT (OUTPATIENT)
Dept: PEDIATRIC ALLERGY IMMUNOLOGY | Facility: CLINIC | Age: 55
End: 2020-01-15
Payer: COMMERCIAL

## 2020-01-15 ENCOUNTER — OUTPATIENT (OUTPATIENT)
Dept: OUTPATIENT SERVICES | Facility: HOSPITAL | Age: 55
LOS: 1 days | End: 2020-01-15
Payer: SELF-PAY

## 2020-01-15 PROCEDURE — 95165 ANTIGEN THERAPY SERVICES: CPT

## 2020-01-15 PROCEDURE — 95117 IMMUNOTHERAPY INJECTIONS: CPT | Mod: NC

## 2020-01-15 PROCEDURE — 95117 IMMUNOTHERAPY INJECTIONS: CPT

## 2020-01-15 PROCEDURE — 95165 ANTIGEN THERAPY SERVICES: CPT | Mod: NC

## 2020-01-17 DIAGNOSIS — J30.89 OTHER ALLERGIC RHINITIS: ICD-10-CM

## 2020-01-17 DIAGNOSIS — Z51.6 ENCOUNTER FOR DESENSITIZATION TO ALLERGENS: ICD-10-CM

## 2020-01-17 DIAGNOSIS — J30.1 ALLERGIC RHINITIS DUE TO POLLEN: ICD-10-CM

## 2020-01-17 DIAGNOSIS — J30.81 ALLERGIC RHINITIS DUE TO ANIMAL (CAT) (DOG) HAIR AND DANDER: ICD-10-CM

## 2020-01-21 DIAGNOSIS — M25.571 PAIN IN RIGHT ANKLE AND JOINTS OF RIGHT FOOT: ICD-10-CM

## 2020-01-21 DIAGNOSIS — R92.8 OTHER ABNORMAL AND INCONCLUSIVE FINDINGS ON DIAGNOSTIC IMAGING OF BREAST: ICD-10-CM

## 2020-01-22 ENCOUNTER — OUTPATIENT (OUTPATIENT)
Dept: OUTPATIENT SERVICES | Facility: HOSPITAL | Age: 55
LOS: 1 days | End: 2020-01-22
Payer: SELF-PAY

## 2020-01-22 ENCOUNTER — APPOINTMENT (OUTPATIENT)
Dept: PEDIATRIC ALLERGY IMMUNOLOGY | Facility: CLINIC | Age: 55
End: 2020-01-22
Payer: COMMERCIAL

## 2020-01-22 PROCEDURE — 95165 ANTIGEN THERAPY SERVICES: CPT | Mod: NC,GC

## 2020-01-22 PROCEDURE — 95165 ANTIGEN THERAPY SERVICES: CPT

## 2020-01-22 PROCEDURE — 95117 IMMUNOTHERAPY INJECTIONS: CPT | Mod: NC,GC

## 2020-01-22 PROCEDURE — 95117 IMMUNOTHERAPY INJECTIONS: CPT

## 2020-01-24 DIAGNOSIS — J30.9 ALLERGIC RHINITIS, UNSPECIFIED: ICD-10-CM

## 2020-01-27 DIAGNOSIS — J30.81 ALLERGIC RHINITIS DUE TO ANIMAL (CAT) (DOG) HAIR AND DANDER: ICD-10-CM

## 2020-01-27 DIAGNOSIS — J30.89 OTHER ALLERGIC RHINITIS: ICD-10-CM

## 2020-01-27 DIAGNOSIS — Z51.6 ENCOUNTER FOR DESENSITIZATION TO ALLERGENS: ICD-10-CM

## 2020-01-27 DIAGNOSIS — J30.1 ALLERGIC RHINITIS DUE TO POLLEN: ICD-10-CM

## 2020-01-29 ENCOUNTER — APPOINTMENT (OUTPATIENT)
Dept: PEDIATRIC ALLERGY IMMUNOLOGY | Facility: CLINIC | Age: 55
End: 2020-01-29
Payer: COMMERCIAL

## 2020-01-29 ENCOUNTER — OUTPATIENT (OUTPATIENT)
Dept: OUTPATIENT SERVICES | Facility: HOSPITAL | Age: 55
LOS: 1 days | End: 2020-01-29
Payer: SELF-PAY

## 2020-01-29 DIAGNOSIS — J30.9 ALLERGIC RHINITIS, UNSPECIFIED: ICD-10-CM

## 2020-01-29 PROCEDURE — 95117 IMMUNOTHERAPY INJECTIONS: CPT

## 2020-01-29 PROCEDURE — 95117 IMMUNOTHERAPY INJECTIONS: CPT | Mod: NC,GC

## 2020-01-29 PROCEDURE — 95165 ANTIGEN THERAPY SERVICES: CPT | Mod: NC,GC

## 2020-01-29 PROCEDURE — 95165 ANTIGEN THERAPY SERVICES: CPT

## 2020-02-02 ENCOUNTER — FORM ENCOUNTER (OUTPATIENT)
Age: 55
End: 2020-02-02

## 2020-02-03 ENCOUNTER — APPOINTMENT (OUTPATIENT)
Dept: MAMMOGRAPHY | Facility: IMAGING CENTER | Age: 55
End: 2020-02-03
Payer: COMMERCIAL

## 2020-02-03 ENCOUNTER — OUTPATIENT (OUTPATIENT)
Dept: OUTPATIENT SERVICES | Facility: HOSPITAL | Age: 55
LOS: 1 days | End: 2020-02-03
Payer: SELF-PAY

## 2020-02-03 DIAGNOSIS — J30.1 ALLERGIC RHINITIS DUE TO POLLEN: ICD-10-CM

## 2020-02-03 DIAGNOSIS — Z51.6 ENCOUNTER FOR DESENSITIZATION TO ALLERGENS: ICD-10-CM

## 2020-02-03 DIAGNOSIS — J30.89 OTHER ALLERGIC RHINITIS: ICD-10-CM

## 2020-02-03 DIAGNOSIS — J30.81 ALLERGIC RHINITIS DUE TO ANIMAL (CAT) (DOG) HAIR AND DANDER: ICD-10-CM

## 2020-02-03 DIAGNOSIS — R92.8 OTHER ABNORMAL AND INCONCLUSIVE FINDINGS ON DIAGNOSTIC IMAGING OF BREAST: ICD-10-CM

## 2020-02-03 PROCEDURE — G0279: CPT | Mod: 26

## 2020-02-03 PROCEDURE — 77066 DX MAMMO INCL CAD BI: CPT | Mod: 26

## 2020-02-03 PROCEDURE — G0279: CPT

## 2020-02-03 PROCEDURE — 77066 DX MAMMO INCL CAD BI: CPT

## 2020-02-05 ENCOUNTER — OUTPATIENT (OUTPATIENT)
Dept: OUTPATIENT SERVICES | Facility: HOSPITAL | Age: 55
LOS: 1 days | End: 2020-02-05
Payer: SELF-PAY

## 2020-02-05 ENCOUNTER — APPOINTMENT (OUTPATIENT)
Dept: PEDIATRIC ALLERGY IMMUNOLOGY | Facility: CLINIC | Age: 55
End: 2020-02-05
Payer: COMMERCIAL

## 2020-02-05 DIAGNOSIS — J30.9 ALLERGIC RHINITIS, UNSPECIFIED: ICD-10-CM

## 2020-02-05 PROCEDURE — 95117 IMMUNOTHERAPY INJECTIONS: CPT

## 2020-02-05 PROCEDURE — 95165 ANTIGEN THERAPY SERVICES: CPT

## 2020-02-05 PROCEDURE — 95117 IMMUNOTHERAPY INJECTIONS: CPT | Mod: NC

## 2020-02-05 PROCEDURE — 95165 ANTIGEN THERAPY SERVICES: CPT | Mod: NC

## 2020-02-07 DIAGNOSIS — Z51.6 ENCOUNTER FOR DESENSITIZATION TO ALLERGENS: ICD-10-CM

## 2020-02-07 DIAGNOSIS — J30.81 ALLERGIC RHINITIS DUE TO ANIMAL (CAT) (DOG) HAIR AND DANDER: ICD-10-CM

## 2020-02-07 DIAGNOSIS — J30.1 ALLERGIC RHINITIS DUE TO POLLEN: ICD-10-CM

## 2020-02-07 DIAGNOSIS — J30.89 OTHER ALLERGIC RHINITIS: ICD-10-CM

## 2020-03-04 ENCOUNTER — APPOINTMENT (OUTPATIENT)
Dept: PEDIATRIC ALLERGY IMMUNOLOGY | Facility: CLINIC | Age: 55
End: 2020-03-04
Payer: COMMERCIAL

## 2020-03-04 ENCOUNTER — OUTPATIENT (OUTPATIENT)
Dept: OUTPATIENT SERVICES | Facility: HOSPITAL | Age: 55
LOS: 1 days | End: 2020-03-04
Payer: SELF-PAY

## 2020-03-04 DIAGNOSIS — J30.9 ALLERGIC RHINITIS, UNSPECIFIED: ICD-10-CM

## 2020-03-04 PROCEDURE — 95165 ANTIGEN THERAPY SERVICES: CPT

## 2020-03-04 PROCEDURE — 95117 IMMUNOTHERAPY INJECTIONS: CPT | Mod: NC,GC

## 2020-03-04 PROCEDURE — 95117 IMMUNOTHERAPY INJECTIONS: CPT

## 2020-03-04 PROCEDURE — 95165 ANTIGEN THERAPY SERVICES: CPT | Mod: NC,GC

## 2020-03-05 ENCOUNTER — APPOINTMENT (OUTPATIENT)
Age: 55
End: 2020-03-05

## 2020-03-09 DIAGNOSIS — J30.89 OTHER ALLERGIC RHINITIS: ICD-10-CM

## 2020-03-09 DIAGNOSIS — J30.81 ALLERGIC RHINITIS DUE TO ANIMAL (CAT) (DOG) HAIR AND DANDER: ICD-10-CM

## 2020-03-09 DIAGNOSIS — J30.1 ALLERGIC RHINITIS DUE TO POLLEN: ICD-10-CM

## 2020-03-09 DIAGNOSIS — Z51.6 ENCOUNTER FOR DESENSITIZATION TO ALLERGENS: ICD-10-CM

## 2020-04-10 ENCOUNTER — OUTPATIENT (OUTPATIENT)
Dept: OUTPATIENT SERVICES | Facility: HOSPITAL | Age: 55
LOS: 1 days | End: 2020-04-10
Payer: SELF-PAY

## 2020-04-10 ENCOUNTER — APPOINTMENT (OUTPATIENT)
Dept: PEDIATRIC ALLERGY IMMUNOLOGY | Facility: CLINIC | Age: 55
End: 2020-04-10
Payer: COMMERCIAL

## 2020-04-10 DIAGNOSIS — J30.9 ALLERGIC RHINITIS, UNSPECIFIED: ICD-10-CM

## 2020-04-10 PROCEDURE — 95117 IMMUNOTHERAPY INJECTIONS: CPT

## 2020-04-10 PROCEDURE — 95117 IMMUNOTHERAPY INJECTIONS: CPT | Mod: NC

## 2020-04-10 PROCEDURE — 95165 ANTIGEN THERAPY SERVICES: CPT

## 2020-04-10 PROCEDURE — 95165 ANTIGEN THERAPY SERVICES: CPT | Mod: NC

## 2020-04-13 DIAGNOSIS — J30.81 ALLERGIC RHINITIS DUE TO ANIMAL (CAT) (DOG) HAIR AND DANDER: ICD-10-CM

## 2020-04-13 DIAGNOSIS — J30.1 ALLERGIC RHINITIS DUE TO POLLEN: ICD-10-CM

## 2020-04-13 DIAGNOSIS — Z51.6 ENCOUNTER FOR DESENSITIZATION TO ALLERGENS: ICD-10-CM

## 2020-04-13 DIAGNOSIS — J30.89 OTHER ALLERGIC RHINITIS: ICD-10-CM

## 2020-04-24 ENCOUNTER — APPOINTMENT (OUTPATIENT)
Dept: OPHTHALMOLOGY | Facility: CLINIC | Age: 55
End: 2020-04-24

## 2020-05-06 ENCOUNTER — OUTPATIENT (OUTPATIENT)
Dept: OUTPATIENT SERVICES | Facility: HOSPITAL | Age: 55
LOS: 1 days | End: 2020-05-06
Payer: SELF-PAY

## 2020-05-06 ENCOUNTER — APPOINTMENT (OUTPATIENT)
Dept: INTERNAL MEDICINE | Facility: CLINIC | Age: 55
End: 2020-05-06

## 2020-05-06 DIAGNOSIS — M54.30 SCIATICA, UNSPECIFIED SIDE: ICD-10-CM

## 2020-05-06 PROCEDURE — G0463: CPT

## 2020-05-07 DIAGNOSIS — Z51.6 ENCOUNTER FOR DESENSITIZATION TO ALLERGENS: ICD-10-CM

## 2020-05-07 DIAGNOSIS — J30.81 ALLERGIC RHINITIS DUE TO ANIMAL (CAT) (DOG) HAIR AND DANDER: ICD-10-CM

## 2020-05-07 DIAGNOSIS — I10 ESSENTIAL (PRIMARY) HYPERTENSION: ICD-10-CM

## 2020-05-07 DIAGNOSIS — J30.89 OTHER ALLERGIC RHINITIS: ICD-10-CM

## 2020-06-03 ENCOUNTER — APPOINTMENT (OUTPATIENT)
Dept: PEDIATRIC ALLERGY IMMUNOLOGY | Facility: CLINIC | Age: 55
End: 2020-06-03
Payer: COMMERCIAL

## 2020-06-03 ENCOUNTER — OUTPATIENT (OUTPATIENT)
Dept: OUTPATIENT SERVICES | Facility: HOSPITAL | Age: 55
LOS: 1 days | End: 2020-06-03
Payer: SELF-PAY

## 2020-06-03 PROCEDURE — 95165 ANTIGEN THERAPY SERVICES: CPT | Mod: NC

## 2020-06-03 PROCEDURE — 95117 IMMUNOTHERAPY INJECTIONS: CPT

## 2020-06-03 PROCEDURE — 95165 ANTIGEN THERAPY SERVICES: CPT

## 2020-06-03 PROCEDURE — 95117 IMMUNOTHERAPY INJECTIONS: CPT | Mod: NC

## 2020-06-11 DIAGNOSIS — J30.81 ALLERGIC RHINITIS DUE TO ANIMAL (CAT) (DOG) HAIR AND DANDER: ICD-10-CM

## 2020-06-11 DIAGNOSIS — J30.89 OTHER ALLERGIC RHINITIS: ICD-10-CM

## 2020-06-11 DIAGNOSIS — J30.1 ALLERGIC RHINITIS DUE TO POLLEN: ICD-10-CM

## 2020-06-11 DIAGNOSIS — Z51.6 ENCOUNTER FOR DESENSITIZATION TO ALLERGENS: ICD-10-CM

## 2020-06-17 ENCOUNTER — APPOINTMENT (OUTPATIENT)
Dept: PEDIATRIC ALLERGY IMMUNOLOGY | Facility: CLINIC | Age: 55
End: 2020-06-17
Payer: COMMERCIAL

## 2020-06-17 ENCOUNTER — OUTPATIENT (OUTPATIENT)
Dept: OUTPATIENT SERVICES | Facility: HOSPITAL | Age: 55
LOS: 1 days | End: 2020-06-17
Payer: SELF-PAY

## 2020-06-17 PROCEDURE — 95117 IMMUNOTHERAPY INJECTIONS: CPT | Mod: NC

## 2020-06-17 PROCEDURE — 95165 ANTIGEN THERAPY SERVICES: CPT | Mod: NC

## 2020-06-17 PROCEDURE — 95165 ANTIGEN THERAPY SERVICES: CPT

## 2020-06-17 PROCEDURE — 95117 IMMUNOTHERAPY INJECTIONS: CPT

## 2020-06-17 RX ORDER — PEAK FLOW METER
EACH MISCELLANEOUS
Qty: 1 | Refills: 1 | Status: ACTIVE | COMMUNITY
Start: 2020-06-17 | End: 1900-01-01

## 2020-06-22 DIAGNOSIS — Z51.6 ENCOUNTER FOR DESENSITIZATION TO ALLERGENS: ICD-10-CM

## 2020-06-22 DIAGNOSIS — J30.89 OTHER ALLERGIC RHINITIS: ICD-10-CM

## 2020-06-22 DIAGNOSIS — J30.81 ALLERGIC RHINITIS DUE TO ANIMAL (CAT) (DOG) HAIR AND DANDER: ICD-10-CM

## 2020-06-22 DIAGNOSIS — J30.1 ALLERGIC RHINITIS DUE TO POLLEN: ICD-10-CM

## 2020-07-02 ENCOUNTER — APPOINTMENT (OUTPATIENT)
Dept: OPHTHALMOLOGY | Facility: CLINIC | Age: 55
End: 2020-07-02

## 2020-07-15 ENCOUNTER — APPOINTMENT (OUTPATIENT)
Dept: PEDIATRIC ALLERGY IMMUNOLOGY | Facility: CLINIC | Age: 55
End: 2020-07-15
Payer: COMMERCIAL

## 2020-07-15 PROCEDURE — 95165 ANTIGEN THERAPY SERVICES: CPT | Mod: NC

## 2020-07-15 PROCEDURE — 95117 IMMUNOTHERAPY INJECTIONS: CPT | Mod: NC

## 2020-07-17 ENCOUNTER — RESULT CHARGE (OUTPATIENT)
Age: 55
End: 2020-07-17

## 2020-07-17 ENCOUNTER — APPOINTMENT (OUTPATIENT)
Dept: INTERNAL MEDICINE | Facility: CLINIC | Age: 55
End: 2020-07-17

## 2020-07-17 ENCOUNTER — OUTPATIENT (OUTPATIENT)
Dept: OUTPATIENT SERVICES | Facility: HOSPITAL | Age: 55
LOS: 1 days | End: 2020-07-17
Payer: SELF-PAY

## 2020-07-17 VITALS
WEIGHT: 200 LBS | BODY MASS INDEX: 31.39 KG/M2 | HEIGHT: 67 IN | DIASTOLIC BLOOD PRESSURE: 70 MMHG | SYSTOLIC BLOOD PRESSURE: 112 MMHG

## 2020-07-17 DIAGNOSIS — M54.9 DORSALGIA, UNSPECIFIED: ICD-10-CM

## 2020-07-17 DIAGNOSIS — Z87.440 PERSONAL HISTORY OF URINARY (TRACT) INFECTIONS: ICD-10-CM

## 2020-07-17 DIAGNOSIS — J30.81 ALLERGIC RHINITIS DUE TO ANIMAL (CAT) (DOG) HAIR AND DANDER: ICD-10-CM

## 2020-07-17 DIAGNOSIS — R82.90 UNSPECIFIED ABNORMAL FINDINGS IN URINE: ICD-10-CM

## 2020-07-17 DIAGNOSIS — Z51.6 ENCOUNTER FOR DESENSITIZATION TO ALLERGENS: ICD-10-CM

## 2020-07-17 DIAGNOSIS — N39.0 URINARY TRACT INFECTION, SITE NOT SPECIFIED: ICD-10-CM

## 2020-07-17 DIAGNOSIS — J30.89 OTHER ALLERGIC RHINITIS: ICD-10-CM

## 2020-07-17 DIAGNOSIS — I10 ESSENTIAL (PRIMARY) HYPERTENSION: ICD-10-CM

## 2020-07-17 LAB
BILIRUB UR QL STRIP: NORMAL
CLARITY UR: NORMAL
COLLECTION METHOD: NORMAL
GLUCOSE UR-MCNC: NORMAL
HGB UR QL STRIP.AUTO: NORMAL
KETONES UR-MCNC: NORMAL
LEUKOCYTE ESTERASE UR QL STRIP: NORMAL
NITRITE UR QL STRIP: POSITIVE
PH UR STRIP: 7
PROT UR STRIP-MCNC: NORMAL
SP GR UR STRIP: 1.02

## 2020-07-17 PROCEDURE — G0463: CPT

## 2020-07-17 RX ORDER — IBUPROFEN 600 MG/1
600 TABLET, FILM COATED ORAL 4 TIMES DAILY
Qty: 60 | Refills: 1 | Status: DISCONTINUED | COMMUNITY
Start: 2020-01-24 | End: 2020-07-17

## 2020-07-17 RX ORDER — MELOXICAM 15 MG/1
15 TABLET ORAL
Qty: 14 | Refills: 0 | Status: DISCONTINUED | COMMUNITY
Start: 2019-10-11 | End: 2020-07-17

## 2020-07-20 NOTE — PHYSICAL EXAM
[No Acute Distress] : no acute distress [Normal Outer Ear/Nose] : the outer ears and nose were normal in appearance [No Respiratory Distress] : no respiratory distress  [No Accessory Muscle Use] : no accessory muscle use [Clear to Auscultation] : lungs were clear to auscultation bilaterally [Regular Rhythm] : with a regular rhythm [Normal Rate] : normal rate  [Supple] : supple [Soft] : abdomen soft [No Edema] : there was no peripheral edema [Normal S1, S2] : normal S1 and S2 [Non-distended] : non-distended [Non Tender] : non-tender [No Masses] : no abdominal mass palpated [No Focal Deficits] : no focal deficits [Grossly Normal Strength/Tone] : grossly normal strength/tone [No HSM] : no HSM [No CVA Tenderness] : no CVA  tenderness [Normal Affect] : the affect was normal [de-identified] : ROM in back intact. + straight leg test on R.  [de-identified] : No lower quadrant pain on palpation [de-identified] : 5/5 muscle strength in legs in all major muscle  groups.  [de-identified] : Has R sided limb on ambulation. (pt reports pain as she ambulates)

## 2020-07-20 NOTE — HISTORY OF PRESENT ILLNESS
[FreeTextEntry8] : \par Patient is a 55 y.o F w/ PMH chronic allergies c/b recurrent allergic sinusitis on active immunotherapy (last therapy 4/10/20) and sciatica who presents for an acute visit regarding R sciatica and foul urine smell. Pain started a year ago, was given pain killers and told to loose wt. Pain subsequently got worse. Had another acute visit in January and May for similar complaints. She started physical therapy  in May which helps temporarily. Reports pain is particularly bad since last week.  Pain starts in R lower back traveling down calf,  is achy in nature and can be severe. Denies numbness/tingling. Worse when walking and lifting and towards end of day. R leg feels fatigued but no loss of strength. Limps due to pain.  Able to put weight on leg. No saddle anesthesia, no urinary or fecal incontinence.   Takes cyclobenzaprine in PM, helps a little. Started taking Ibuprofen yesterday which helps. Has taken multiple NSAID courses in the past with no long term resolution. She was supposed to follow with ortho but has not followed up. \par \par Patient states that her urine smells bad and has suprapubic pain for the past two weeks. No dysuria, blood in  urine or increased urinary frequency. No fevers, chills, N/V. No back tenderness.

## 2020-07-20 NOTE — REVIEW OF SYSTEMS
[Back Pain] : back pain [Muscle Pain] : muscle pain [Negative] : Heme/Lymph [Dysuria] : no dysuria [Hematuria] : no hematuria [FreeTextEntry8] : foul urine smell, suprapubic pain [FreeTextEntry7] : reports some constipation [Frequency] : no frequency

## 2020-07-20 NOTE — PLAN
[FreeTextEntry1] : \par \par Patient is a 55 y.o F w/ PMH chronic allergies c/b recurrent allergic sinusitis on active immunotherapy (last therapy 4/10/20) and sciatica who presents for an acute visit regarding R sciatica and foul urine smell. \par \par #back pain with R sided sciatica\par - F/u with orthopedics for futher management given failure of conservative treatment\par - cont PT\par - Start ibuprofen 400mg TID with meals x 7 days, pt reports tolerating NSAIDS (no GI symps)\par - cont Flexeril for now\par -advised pt that NSAID and muscle relaxants are not for long term\par - will get MR lumbar spine\par - pt educated on red flag symptoms\par \par #UTI\par - + nitrates on UA\par - urine culture\par - start nitrofurantoin x 5 days\par \par Needs f/u for CPE \par

## 2020-07-22 LAB — BACTERIA UR CULT: ABNORMAL

## 2020-07-29 ENCOUNTER — OUTPATIENT (OUTPATIENT)
Dept: OUTPATIENT SERVICES | Facility: HOSPITAL | Age: 55
LOS: 1 days | End: 2020-07-29
Payer: SELF-PAY

## 2020-07-29 ENCOUNTER — APPOINTMENT (OUTPATIENT)
Dept: MRI IMAGING | Facility: CLINIC | Age: 55
End: 2020-07-29
Payer: COMMERCIAL

## 2020-07-29 DIAGNOSIS — Z00.8 ENCOUNTER FOR OTHER GENERAL EXAMINATION: ICD-10-CM

## 2020-07-29 PROCEDURE — 72148 MRI LUMBAR SPINE W/O DYE: CPT | Mod: 26

## 2020-07-29 PROCEDURE — 72148 MRI LUMBAR SPINE W/O DYE: CPT

## 2020-08-19 ENCOUNTER — APPOINTMENT (OUTPATIENT)
Dept: PEDIATRIC ALLERGY IMMUNOLOGY | Facility: CLINIC | Age: 55
End: 2020-08-19
Payer: COMMERCIAL

## 2020-08-19 PROCEDURE — 95165 ANTIGEN THERAPY SERVICES: CPT | Mod: NC,GC

## 2020-08-19 PROCEDURE — 95117 IMMUNOTHERAPY INJECTIONS: CPT | Mod: NC,GC

## 2020-08-26 ENCOUNTER — OUTPATIENT (OUTPATIENT)
Dept: OUTPATIENT SERVICES | Facility: HOSPITAL | Age: 55
LOS: 1 days | End: 2020-08-26
Payer: SELF-PAY

## 2020-08-26 ENCOUNTER — APPOINTMENT (OUTPATIENT)
Dept: ORTHOPEDIC SURGERY | Facility: HOSPITAL | Age: 55
End: 2020-08-26

## 2020-08-26 VITALS
HEIGHT: 67 IN | SYSTOLIC BLOOD PRESSURE: 164 MMHG | BODY MASS INDEX: 31.39 KG/M2 | HEART RATE: 87 BPM | DIASTOLIC BLOOD PRESSURE: 87 MMHG | TEMPERATURE: 98.9 F | WEIGHT: 200 LBS

## 2020-08-26 DIAGNOSIS — M79.609 PAIN IN UNSPECIFIED LIMB: ICD-10-CM

## 2020-08-26 PROCEDURE — G0463: CPT

## 2020-08-26 RX ORDER — CYCLOBENZAPRINE HYDROCHLORIDE 10 MG/1
10 TABLET, FILM COATED ORAL
Qty: 30 | Refills: 0 | Status: DISCONTINUED | COMMUNITY
Start: 2020-05-06 | End: 2020-08-26

## 2020-08-26 RX ORDER — NITROFURANTOIN MACROCRYSTALS 100 MG/1
100 CAPSULE ORAL
Qty: 10 | Refills: 0 | Status: DISCONTINUED | COMMUNITY
Start: 2020-07-17 | End: 2020-08-26

## 2020-08-27 DIAGNOSIS — M54.5 LOW BACK PAIN: ICD-10-CM

## 2020-08-31 RX ORDER — NAPROXEN 500 MG/1
500 TABLET ORAL
Qty: 60 | Refills: 0 | Status: DISCONTINUED | COMMUNITY
Start: 2020-08-26 | End: 2020-08-31

## 2020-09-16 ENCOUNTER — APPOINTMENT (OUTPATIENT)
Dept: PEDIATRIC ALLERGY IMMUNOLOGY | Facility: CLINIC | Age: 55
End: 2020-09-16
Payer: COMMERCIAL

## 2020-09-16 PROCEDURE — 95117 IMMUNOTHERAPY INJECTIONS: CPT

## 2020-09-16 PROCEDURE — 95165 ANTIGEN THERAPY SERVICES: CPT

## 2020-10-14 ENCOUNTER — APPOINTMENT (OUTPATIENT)
Dept: PEDIATRIC ALLERGY IMMUNOLOGY | Facility: CLINIC | Age: 55
End: 2020-10-14
Payer: COMMERCIAL

## 2020-10-14 PROCEDURE — ZZZZZ: CPT

## 2020-10-30 ENCOUNTER — RESULT CHARGE (OUTPATIENT)
Age: 55
End: 2020-10-30

## 2020-11-03 LAB
BACTERIA UR CULT: ABNORMAL
BILIRUB UR QL STRIP: NORMAL
CLARITY UR: CLEAR
COLLECTION METHOD: NORMAL
GLUCOSE UR-MCNC: NORMAL
HCG UR QL: 0.2 EU/DL
HGB UR QL STRIP.AUTO: NORMAL
KETONES UR-MCNC: NORMAL
LEUKOCYTE ESTERASE UR QL STRIP: NORMAL
NITRITE UR QL STRIP: POSITIVE
PH UR STRIP: 7
PROT UR STRIP-MCNC: NORMAL
SP GR UR STRIP: 1.02

## 2020-11-25 ENCOUNTER — APPOINTMENT (OUTPATIENT)
Dept: PEDIATRIC ALLERGY IMMUNOLOGY | Facility: CLINIC | Age: 55
End: 2020-11-25
Payer: COMMERCIAL

## 2020-11-25 ENCOUNTER — NON-APPOINTMENT (OUTPATIENT)
Age: 55
End: 2020-11-25

## 2020-11-25 ENCOUNTER — APPOINTMENT (OUTPATIENT)
Dept: OBGYN | Facility: CLINIC | Age: 55
End: 2020-11-25
Payer: COMMERCIAL

## 2020-11-25 DIAGNOSIS — J30.89 OTHER ALLERGIC RHINITIS: ICD-10-CM

## 2020-11-25 DIAGNOSIS — J30.9 ALLERGIC RHINITIS, UNSPECIFIED: ICD-10-CM

## 2020-11-25 PROCEDURE — ZZZZZ: CPT

## 2020-11-25 PROCEDURE — 99072 ADDL SUPL MATRL&STAF TM PHE: CPT

## 2020-11-25 PROCEDURE — 81002 URINALYSIS NONAUTO W/O SCOPE: CPT

## 2020-11-30 LAB
BACTERIA UR CULT: ABNORMAL
BILIRUB UR QL STRIP: NEGATIVE
CLARITY UR: CLEAR
COLLECTION METHOD: NORMAL
GLUCOSE UR-MCNC: NEGATIVE
HCG UR QL: 0.2 EU/DL
HGB UR QL STRIP.AUTO: NEGATIVE
KETONES UR-MCNC: NEGATIVE
LEUKOCYTE ESTERASE UR QL STRIP: NEGATIVE
NITRITE UR QL STRIP: POSITIVE
PH UR STRIP: 7
PROT UR STRIP-MCNC: NEGATIVE
SP GR UR STRIP: 1.02

## 2020-12-02 ENCOUNTER — APPOINTMENT (OUTPATIENT)
Dept: ORTHOPEDIC SURGERY | Facility: HOSPITAL | Age: 55
End: 2020-12-02

## 2020-12-15 ENCOUNTER — APPOINTMENT (OUTPATIENT)
Dept: PEDIATRIC ALLERGY IMMUNOLOGY | Facility: CLINIC | Age: 55
End: 2020-12-15
Payer: COMMERCIAL

## 2020-12-15 ENCOUNTER — APPOINTMENT (OUTPATIENT)
Dept: OBGYN | Facility: CLINIC | Age: 55
End: 2020-12-15
Payer: COMMERCIAL

## 2020-12-15 VITALS
BODY MASS INDEX: 32.18 KG/M2 | DIASTOLIC BLOOD PRESSURE: 76 MMHG | WEIGHT: 205 LBS | SYSTOLIC BLOOD PRESSURE: 150 MMHG | HEIGHT: 67 IN

## 2020-12-15 DIAGNOSIS — D21.9 BENIGN NEOPLASM OF CONNECTIVE AND OTHER SOFT TISSUE, UNSPECIFIED: ICD-10-CM

## 2020-12-15 DIAGNOSIS — Z51.6 ENCOUNTER FOR DESENSITIZATION TO ALLERGENS: ICD-10-CM

## 2020-12-15 DIAGNOSIS — Z01.419 ENCOUNTER FOR GYNECOLOGICAL EXAMINATION (GENERAL) (ROUTINE) W/OUT ABNORMAL FINDINGS: ICD-10-CM

## 2020-12-15 DIAGNOSIS — J30.89 OTHER ALLERGIC RHINITIS: ICD-10-CM

## 2020-12-15 PROBLEM — Z87.09 HISTORY OF UPPER RESPIRATORY INFECTION: Status: RESOLVED | Noted: 2017-10-09 | Resolved: 2020-12-15

## 2020-12-15 PROCEDURE — 99072 ADDL SUPL MATRL&STAF TM PHE: CPT

## 2020-12-15 PROCEDURE — 99396 PREV VISIT EST AGE 40-64: CPT

## 2020-12-15 PROCEDURE — 95117 IMMUNOTHERAPY INJECTIONS: CPT

## 2020-12-15 PROCEDURE — 95165 ANTIGEN THERAPY SERVICES: CPT

## 2021-01-07 NOTE — HISTORY OF PRESENT ILLNESS
[FreeTextEntry1] : 56yo P2 PM woman here for annual exam\par \par fibroid uterus and supropubic pain though note resisent UTI in prior month\par \par decreased libido\par \par signif improvement in BV

## 2021-01-18 LAB
CYTOLOGY CVX/VAG DOC THIN PREP: NORMAL
HPV HIGH+LOW RISK DNA PNL CVX: NOT DETECTED

## 2021-01-26 ENCOUNTER — OUTPATIENT (OUTPATIENT)
Dept: OUTPATIENT SERVICES | Facility: HOSPITAL | Age: 56
LOS: 1 days | End: 2021-01-26
Payer: COMMERCIAL

## 2021-01-26 ENCOUNTER — APPOINTMENT (OUTPATIENT)
Dept: OPHTHALMOLOGY | Facility: CLINIC | Age: 56
End: 2021-01-26

## 2021-01-26 ENCOUNTER — APPOINTMENT (OUTPATIENT)
Dept: ULTRASOUND IMAGING | Facility: CLINIC | Age: 56
End: 2021-01-26
Payer: COMMERCIAL

## 2021-01-26 DIAGNOSIS — D21.9 BENIGN NEOPLASM OF CONNECTIVE AND OTHER SOFT TISSUE, UNSPECIFIED: ICD-10-CM

## 2021-01-26 PROCEDURE — 76830 TRANSVAGINAL US NON-OB: CPT

## 2021-01-26 PROCEDURE — 76830 TRANSVAGINAL US NON-OB: CPT | Mod: 26

## 2021-02-17 ENCOUNTER — APPOINTMENT (OUTPATIENT)
Dept: PEDIATRIC ALLERGY IMMUNOLOGY | Facility: CLINIC | Age: 56
End: 2021-02-17

## 2021-04-07 ENCOUNTER — APPOINTMENT (OUTPATIENT)
Dept: PEDIATRIC ALLERGY IMMUNOLOGY | Facility: CLINIC | Age: 56
End: 2021-04-07

## 2021-05-13 ENCOUNTER — NON-APPOINTMENT (OUTPATIENT)
Age: 56
End: 2021-05-13

## 2021-05-14 ENCOUNTER — APPOINTMENT (OUTPATIENT)
Dept: INTERNAL MEDICINE | Facility: CLINIC | Age: 56
End: 2021-05-14
Payer: COMMERCIAL

## 2021-05-14 ENCOUNTER — NON-APPOINTMENT (OUTPATIENT)
Age: 56
End: 2021-05-14

## 2021-05-14 VITALS
WEIGHT: 205 LBS | SYSTOLIC BLOOD PRESSURE: 142 MMHG | BODY MASS INDEX: 32.95 KG/M2 | OXYGEN SATURATION: 98 % | HEIGHT: 66 IN | DIASTOLIC BLOOD PRESSURE: 70 MMHG | HEART RATE: 58 BPM

## 2021-05-14 VITALS — DIASTOLIC BLOOD PRESSURE: 85 MMHG | SYSTOLIC BLOOD PRESSURE: 135 MMHG

## 2021-05-14 DIAGNOSIS — R23.8 OTHER SKIN CHANGES: ICD-10-CM

## 2021-05-14 DIAGNOSIS — L02.429 FURUNCLE OF LIMB, UNSPECIFIED: ICD-10-CM

## 2021-05-14 DIAGNOSIS — N61.1 ABSCESS OF THE BREAST AND NIPPLE: ICD-10-CM

## 2021-05-14 PROCEDURE — 99396 PREV VISIT EST AGE 40-64: CPT | Mod: 25

## 2021-05-14 PROCEDURE — 99072 ADDL SUPL MATRL&STAF TM PHE: CPT

## 2021-05-14 PROCEDURE — 81003 URINALYSIS AUTO W/O SCOPE: CPT | Mod: QW

## 2021-05-14 PROCEDURE — 93000 ELECTROCARDIOGRAM COMPLETE: CPT

## 2021-05-16 ENCOUNTER — NON-APPOINTMENT (OUTPATIENT)
Age: 56
End: 2021-05-16

## 2021-05-16 PROBLEM — L02.429 FURUNCLE OF THIGH: Status: ACTIVE | Noted: 2021-05-16

## 2021-05-16 PROBLEM — N61.1 FURUNCLE OF BREAST: Status: ACTIVE | Noted: 2021-05-16

## 2021-05-16 LAB
25(OH)D3 SERPL-MCNC: 35.3 NG/ML
ALBUMIN SERPL ELPH-MCNC: 4.4 G/DL
ALP BLD-CCNC: 62 U/L
ALT SERPL-CCNC: 14 U/L
ANION GAP SERPL CALC-SCNC: 11 MMOL/L
AST SERPL-CCNC: 16 U/L
BASOPHILS # BLD AUTO: 0.05 K/UL
BASOPHILS NFR BLD AUTO: 0.8 %
BILIRUB SERPL-MCNC: 0.3 MG/DL
BILIRUB UR QL STRIP: NORMAL
BUN SERPL-MCNC: 20 MG/DL
CALCIUM SERPL-MCNC: 9.5 MG/DL
CHLORIDE SERPL-SCNC: 104 MMOL/L
CHOLEST SERPL-MCNC: 205 MG/DL
CLARITY UR: NORMAL
CO2 SERPL-SCNC: 25 MMOL/L
COLLECTION METHOD: NORMAL
CREAT SERPL-MCNC: 0.86 MG/DL
EOSINOPHIL # BLD AUTO: 0.15 K/UL
EOSINOPHIL NFR BLD AUTO: 2.5 %
ESTIMATED AVERAGE GLUCOSE: 105 MG/DL
GLUCOSE SERPL-MCNC: 78 MG/DL
GLUCOSE UR-MCNC: NORMAL
HBA1C MFR BLD HPLC: 5.3 %
HCG UR QL: 0.2 EU/DL
HCT VFR BLD CALC: 41 %
HDLC SERPL-MCNC: 58 MG/DL
HGB BLD-MCNC: 12.3 G/DL
HGB UR QL STRIP.AUTO: NORMAL
IMM GRANULOCYTES NFR BLD AUTO: 0.3 %
KETONES UR-MCNC: NORMAL
LDLC SERPL CALC-MCNC: 137 MG/DL
LEUKOCYTE ESTERASE UR QL STRIP: NORMAL
LYMPHOCYTES # BLD AUTO: 2.79 K/UL
LYMPHOCYTES NFR BLD AUTO: 46.3 %
MAN DIFF?: NORMAL
MCHC RBC-ENTMCNC: 27.8 PG
MCHC RBC-ENTMCNC: 30 GM/DL
MCV RBC AUTO: 92.8 FL
MONOCYTES # BLD AUTO: 0.49 K/UL
MONOCYTES NFR BLD AUTO: 8.1 %
NEUTROPHILS # BLD AUTO: 2.53 K/UL
NEUTROPHILS NFR BLD AUTO: 42 %
NITRITE UR QL STRIP: NORMAL
NONHDLC SERPL-MCNC: 147 MG/DL
PH UR STRIP: 6
PLATELET # BLD AUTO: 297 K/UL
POTASSIUM SERPL-SCNC: 5.3 MMOL/L
PROT SERPL-MCNC: 7.8 G/DL
PROT UR STRIP-MCNC: NORMAL
RBC # BLD: 4.42 M/UL
RBC # FLD: 13.2 %
SODIUM SERPL-SCNC: 140 MMOL/L
SP GR UR STRIP: >=1.03
TRIGL SERPL-MCNC: 49 MG/DL
TSH SERPL-ACNC: 1.54 UIU/ML
WBC # FLD AUTO: 6.03 K/UL

## 2021-05-16 NOTE — HISTORY OF PRESENT ILLNESS
[FreeTextEntry1] : initial visit with this provider - here for CPE [de-identified] : DELANO CARNEY  is a 56 year old F  who presents with hx of uti, HTN, multiple allergies. \par Generally healthy came for comprehensive physical. In addition, has couple of concerns:\par \par 1. possible current UTI: experiencing suprapubic area pain, increased urgency and frequency for 2 weeks. A few months ago she was treated with Abx and felt better. No hematuria, no odor. \par \par 2. chronic Rt leg and lower back, rt buttock area pain: has done physical  therapy and has been seeing chiropractor but still painful  - worst is 8/10. Pain described as aching and shooting down leg. \par Working  as home health aid. Has hx of Sciatica. Rt>> Lt. Has taken mobic, tylenol with little effect. calf, inguinal area spasm+." Sometimes my leg is giving off".  No fever, no sensory changes, no focal weakness, no incontinence. \par \par 3. Skin pimple of left lateral breast area: first recognized 3 weeks ago. Has gotten similar cystic pimples in other areas- currently has one on her left upper thigh. \par \par 4. hx of breast biopsy left breast. \par \par

## 2021-05-16 NOTE — PHYSICAL EXAM
[No Acute Distress] : no acute distress [PERRL] : pupils equal round and reactive to light [Normal Sclera/Conjunctiva] : normal sclera/conjunctiva [EOMI] : extraocular movements intact [Normal Outer Ear/Nose] : the outer ears and nose were normal in appearance [Normal Oropharynx] : the oropharynx was normal [No JVD] : no jugular venous distention [No Lymphadenopathy] : no lymphadenopathy [Supple] : supple [Normal Rate] : normal rate  [Regular Rhythm] : with a regular rhythm [Normal S1, S2] : normal S1 and S2 [No Carotid Bruits] : no carotid bruits [No Abdominal Bruit] : a ~M bruit was not heard ~T in the abdomen [No Edema] : there was no peripheral edema [Normal Appearance] : normal in appearance [No Nipple Discharge] : no nipple discharge [No Axillary Lymphadenopathy] : no axillary lymphadenopathy [Soft] : abdomen soft [Non Tender] : non-tender [No CVA Tenderness] : no CVA  tenderness [Normal] : no joint swelling and grossly normal strength and tone [Coordination Grossly Intact] : coordination grossly intact [No Focal Deficits] : no focal deficits [Normal Gait] : normal gait [Normal Affect] : the affect was normal [Normal Insight/Judgement] : insight and judgment were intact [No Spinal Tenderness] : no spinal tenderness [Grossly Normal Strength/Tone] : grossly normal strength/tone [de-identified] : posterior neck had 3x3cm soft bump. pt stated it became bigger  [de-identified] : left paraspinal lower back area tenderness [de-identified] : Rt leg straight raise test+ lower back and upper leg pain.  No point tenderness over spine. No focal weakness. [de-identified] :  2 small furuncles - one left lateral breast area,one left upper leg area

## 2021-05-16 NOTE — END OF VISIT
[FreeTextEntry3] : This patient was seen and examined in conjunction with Cielo Friend NP who acted as scribe.  The documentation she provided has been reviewed and corrected if and where appropriate.

## 2021-05-16 NOTE — HEALTH RISK ASSESSMENT
[Good] : ~his/her~  mood as  good [Yes] : Yes [Monthly or less (1 pt)] : Monthly or less (1 point) [1 or 2 (0 pts)] : 1 or 2 (0 points) [Never (0 pts)] : Never (0 points) [One fall no injury in past year] : Patient reported one fall in the past year without injury [0] : 2) Feeling down, depressed, or hopeless: Not at all (0) [Patient reported PAP Smear was normal] : Patient reported PAP Smear was normal [Patient reported colonoscopy was normal] : Patient reported colonoscopy was normal [With Family] : lives with family [Employed] : employed [] :  [Sexually Active] : sexually active [Feels Safe at Home] : Feels safe at home [Fully functional (bathing, dressing, toileting, transferring, walking, feeding)] : Fully functional (bathing, dressing, toileting, transferring, walking, feeding) [Fully functional (using the telephone, shopping, preparing meals, housekeeping, doing laundry, using] : Fully functional and needs no help or supervision to perform IADLs (using the telephone, shopping, preparing meals, housekeeping, doing laundry, using transportation, managing medications and managing finances) [Patient reported mammogram was normal] : Patient reported mammogram was normal [] : No [de-identified] : not really due to pain, trying walking [[id number]] : 0 [de-identified] : regular and tried Keto diet [Change in mental status noted] : No change in mental status noted [Reports changes in hearing] : Reports no changes in hearing [Smoke Detector] : no smoke detector [Carbon Monoxide Detector] : no carbon monoxide detector [Guns at Home] : no guns at home [MammogramDate] : 02/2020 [PapSmearDate] : 12/2020 [MammogramComments] : birads 2 [ColonoscopyDate] : 1/2013 [de-identified] : mother, , son [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

## 2021-05-16 NOTE — REVIEW OF SYSTEMS
[Dysuria] : dysuria [Joint Pain] : joint pain [Negative] : Heme/Lymph [Fever] : no fever [Chills] : no chills [Fatigue] : no fatigue [Discharge] : no discharge [Pain] : no pain [Earache] : no earache [Sore Throat] : no sore throat [Chest Pain] : no chest pain [Palpitations] : no palpitations [Lower Ext Edema] : no lower extremity edema [Headache] : no headache [Dizziness] : no dizziness [FreeTextEntry8] : increased urgency and frequency for recent 2 weeks [FreeTextEntry9] : lower back, buttock, Rt leg pain to ankle, hx of sciatica, occasional Rt leg weakness [de-identified] : left breast lateral area noted with 0.3x0.3cm pimple, discoloration

## 2021-05-18 ENCOUNTER — NON-APPOINTMENT (OUTPATIENT)
Age: 56
End: 2021-05-18

## 2021-05-18 LAB
APPEARANCE: ABNORMAL
BACTERIA UR CULT: NORMAL
BACTERIA: NEGATIVE
BILIRUBIN URINE: NEGATIVE
BLOOD URINE: NEGATIVE
COLOR: YELLOW
GLUCOSE QUALITATIVE U: NEGATIVE
HYALINE CASTS: 0 /LPF
KETONES URINE: NEGATIVE
LEUKOCYTE ESTERASE URINE: NEGATIVE
MICROSCOPIC-UA: NORMAL
NITRITE URINE: NEGATIVE
PH URINE: 6.5
PROTEIN URINE: NORMAL
RED BLOOD CELLS URINE: 3 /HPF
SPECIFIC GRAVITY URINE: 1.03
SQUAMOUS EPITHELIAL CELLS: 14 /HPF
UROBILINOGEN URINE: NORMAL
WHITE BLOOD CELLS URINE: 1 /HPF

## 2021-05-21 ENCOUNTER — LABORATORY RESULT (OUTPATIENT)
Age: 56
End: 2021-05-21

## 2021-05-21 ENCOUNTER — APPOINTMENT (OUTPATIENT)
Dept: PEDIATRIC ALLERGY IMMUNOLOGY | Facility: CLINIC | Age: 56
End: 2021-05-21
Payer: COMMERCIAL

## 2021-05-21 VITALS
WEIGHT: 208 LBS | BODY MASS INDEX: 33.43 KG/M2 | TEMPERATURE: 96.3 F | HEIGHT: 66 IN | HEART RATE: 54 BPM | OXYGEN SATURATION: 98 % | DIASTOLIC BLOOD PRESSURE: 98 MMHG | SYSTOLIC BLOOD PRESSURE: 167 MMHG

## 2021-05-21 DIAGNOSIS — J30.1 ALLERGIC RHINITIS DUE TO POLLEN: ICD-10-CM

## 2021-05-21 DIAGNOSIS — J30.81 ALLERGIC RHINITIS DUE TO ANIMAL (CAT) (DOG) HAIR AND DANDER: ICD-10-CM

## 2021-05-21 DIAGNOSIS — Z91.010 ALLERGY TO PEANUTS: ICD-10-CM

## 2021-05-21 DIAGNOSIS — Z91.018 ALLERGY TO OTHER FOODS: ICD-10-CM

## 2021-05-21 DIAGNOSIS — J30.89 OTHER ALLERGIC RHINITIS: ICD-10-CM

## 2021-05-21 PROCEDURE — 95004 PERQ TESTS W/ALRGNC XTRCS: CPT

## 2021-05-21 PROCEDURE — 99214 OFFICE O/P EST MOD 30 MIN: CPT | Mod: 25

## 2021-05-21 PROCEDURE — 99072 ADDL SUPL MATRL&STAF TM PHE: CPT

## 2021-05-21 RX ORDER — IBUPROFEN 400 MG/1
400 TABLET, FILM COATED ORAL 3 TIMES DAILY
Qty: 21 | Refills: 0 | Status: COMPLETED | COMMUNITY
Start: 2020-07-17 | End: 2021-05-21

## 2021-05-21 RX ORDER — SULFAMETHOXAZOLE AND TRIMETHOPRIM 800; 160 MG/1; MG/1
800-160 TABLET ORAL
Qty: 14 | Refills: 0 | Status: COMPLETED | COMMUNITY
Start: 2020-11-30 | End: 2021-05-21

## 2021-05-21 RX ORDER — EPINEPHRINE 0.3 MG/.3ML
0.3 INJECTION INTRAMUSCULAR
Qty: 1 | Refills: 3 | Status: ACTIVE | COMMUNITY
Start: 2017-03-31 | End: 1900-01-01

## 2021-05-21 RX ORDER — IBUPROFEN 400 MG/1
400 TABLET, FILM COATED ORAL
Refills: 0 | Status: COMPLETED | COMMUNITY
Start: 2020-07-17 | End: 2021-05-21

## 2021-05-21 RX ORDER — FLUCONAZOLE 150 MG/1
150 TABLET ORAL
Qty: 2 | Refills: 0 | Status: COMPLETED | COMMUNITY
Start: 2020-11-30 | End: 2021-05-21

## 2021-05-21 RX ORDER — AZELASTINE HYDROCHLORIDE 137 UG/1
0.1 SPRAY, METERED NASAL TWICE DAILY
Qty: 1 | Refills: 2 | Status: COMPLETED | COMMUNITY
Start: 2018-05-25 | End: 2021-05-21

## 2021-05-21 RX ORDER — FEXOFENADINE HYDROCHLORIDE 180 MG/1
180 TABLET ORAL DAILY
Qty: 1 | Refills: 3 | Status: ACTIVE | COMMUNITY
Start: 2020-01-15

## 2021-05-21 RX ORDER — MELOXICAM 15 MG/1
15 TABLET ORAL DAILY
Qty: 1 | Refills: 2 | Status: COMPLETED | COMMUNITY
Start: 2020-08-31 | End: 2021-05-21

## 2021-05-21 RX ORDER — CEPHALEXIN 500 MG/1
500 CAPSULE ORAL
Qty: 14 | Refills: 0 | Status: COMPLETED | COMMUNITY
Start: 2020-10-30 | End: 2021-05-21

## 2021-05-21 RX ORDER — CETIRIZINE HYDROCHLORIDE 10 MG/1
10 TABLET, FILM COATED ORAL
Qty: 1 | Refills: 3 | Status: COMPLETED | COMMUNITY
Start: 2020-08-19 | End: 2021-05-21

## 2021-05-21 RX ORDER — NITROFURANTOIN (MONOHYDRATE/MACROCRYSTALS) 25; 75 MG/1; MG/1
100 CAPSULE ORAL
Qty: 14 | Refills: 0 | Status: COMPLETED | COMMUNITY
Start: 2020-11-25 | End: 2021-05-21

## 2021-05-21 NOTE — IMPRESSION
[Allergy Testing Dust Mite] : dust mites [Allergy Testing Cockroach] : cockroach [Allergy Testing Cat] : cat [Allergy Testing Trees] : trees [Allergy Testing Mixed Feathers] : feathers [Allergy Testing Dog] : dog [Allergy Testing Weeds] : weeds [Allergy Testing Grasses] : grasses [] : peanut [________] : [unfilled]

## 2021-05-21 NOTE — REASON FOR VISIT
[Routine Follow-Up] : a routine follow-up visit for [Hay Fever] : hay fever [To Food] : allergy to food

## 2021-05-25 NOTE — PHYSICAL EXAM
[Alert] : alert [Well Nourished] : well nourished [No Acute Distress] : no acute distress [Well Developed] : well developed [Sclera Not Icteric] : sclera not icteric [Normal TMs] : both tympanic membranes were normal [Normal Tonsils] : normal tonsils [Normal Rate and Effort] : normal respiratory rhythm and effort [No Crackles] : no crackles [Bilateral Audible Breath Sounds] : bilateral audible breath sounds [Normal Rate] : heart rate was normal  [Normal S1, S2] : normal S1 and S2 [No murmur] : no murmur [Regular Rhythm] : with a regular rhythm [Conjunctival Erythema] : no conjunctival erythema [Suborbital Bogginess] : no suborbital bogginess (allergic shiners) [Boggy Nasal Turbinates] : no boggy and/or pale nasal turbinates [Pharyngeal erythema] : no pharyngeal erythema [Posterior Pharyngeal Cobblestoning] : no posterior pharyngeal cobblestoning [Clear Rhinorrhea] : no clear rhinorrhea was seen [Exudate] : no exudate [Wheezing] : no wheezing was heard

## 2021-05-25 NOTE — END OF VISIT
[FreeTextEntry3] : Case discussed with PA; present through out visit and supervised procedures of AKILAH Gaines.\par

## 2021-05-25 NOTE — REVIEW OF SYSTEMS
[Rhinorrhea] : rhinorrhea [Nl] : Integumentary [Fatigue] : no fatigue [Fever] : no fever [Puffy Eyelids] : no puffy ~T eyelids [Oral Thrush] : no oral thrush [Post Nasal Drip] : no post nasal drip [Sneezing] : no sneezing [Cyanosis] : no cyanosis [Edema] : no edema [Exercise Intolerance] : no persistence of exercise intolerance [Palpitations] : no palpitations [SOB with Exertion] : no dyspnea on exertion [Cough] : no cough [Wheezing Worsens With Exercise] : wheezing does not worsen with exercise

## 2021-05-25 NOTE — HISTORY OF PRESENT ILLNESS
[de-identified] : \par 56 year old woman with allergic rhinitis and food allergies here for follow up.  \par \par Allergic rhinitis: She was on IT, due to time restraints, stopped IT in Dec 2020 ( She began IT in 2017 and reached maintenance in July 2018). Her allergy symptoms significantly improved with IT. Currently admits to intermittent ocular pruritus. On Allegra, which was discontinued five days ago, for today's testing. Denies use of nasal steroids. \par \par \par oral Allergy: She avoids most melons, avocados, apples and peaches. She prefers to cut them out entirely and is not interested in baked/canned forms.\par \par Peanut allergy: carries an Epipen. Avoids peanut, almond (globus sensation) and brazil nut (never ingested. She eats pecans,  cashew, walnut, mariaa nut and pistachio without any issues.  last blood test in 2017 was negative to nuts and peanuts but skin test positive to peanut and  almond. \par

## 2021-05-28 ENCOUNTER — APPOINTMENT (OUTPATIENT)
Dept: ORTHOPEDIC SURGERY | Facility: CLINIC | Age: 56
End: 2021-05-28
Payer: COMMERCIAL

## 2021-05-28 VITALS
DIASTOLIC BLOOD PRESSURE: 108 MMHG | SYSTOLIC BLOOD PRESSURE: 168 MMHG | WEIGHT: 200 LBS | HEIGHT: 66 IN | BODY MASS INDEX: 32.14 KG/M2 | HEART RATE: 57 BPM

## 2021-05-28 DIAGNOSIS — Z78.9 OTHER SPECIFIED HEALTH STATUS: ICD-10-CM

## 2021-05-28 PROCEDURE — 72110 X-RAY EXAM L-2 SPINE 4/>VWS: CPT

## 2021-05-28 PROCEDURE — 99072 ADDL SUPL MATRL&STAF TM PHE: CPT

## 2021-05-28 PROCEDURE — 99204 OFFICE O/P NEW MOD 45 MIN: CPT

## 2021-05-28 RX ORDER — ACETAMINOPHEN 500 MG/1
500 TABLET, COATED ORAL
Qty: 50 | Refills: 0 | Status: ACTIVE | COMMUNITY
Start: 2021-05-28 | End: 1900-01-01

## 2021-06-01 PROBLEM — Z78.9 EXERCISES OCCASIONALLY: Status: ACTIVE | Noted: 2021-05-28

## 2021-06-01 NOTE — PHYSICAL EXAM
[de-identified] : Lumbar Physical Exam\par \par Gait - Normal\par \par Station - Normal\par \par Sagittal balance - Normal\par \par Compensatory mechanism? - None\par \par Heel walk - Normal\par \par Toe walk - Normal\par \par Reflexes\par Patellar - normal\par Gastroc - normal\par Clonus - No\par \par Hip Exam - Normal\par \par Straight leg raise - none\par \par Pulses - 2+ dp/pt\par \par Range of motion -  reduced\par \par Sensation \par Sensation intact to light touch in L1, L2, L3, L4, L5 and S1 dermatomes bilaterally\par \par Motor\par 	IP	Quad	HS	TA	Gastroc	EHL\par Right	5/5	5/5	5/5	3+/5	5/5	4/5\par Left	5/5	5/5	5/5	5/5	5/5	5/5 [de-identified] : Lumbar radiographs\par L4-L5 spondylolisthesis noted\par Facet arthropathy noted\par Disc height loss noted

## 2021-06-01 NOTE — HISTORY OF PRESENT ILLNESS
[de-identified] : This is a 56-year-old female here today for evaluation of low back pain and leg pain.  She states that her right-sided leg pain is worse than her left.  It does go down her posterior buttock and posterior thigh.  She does feel like her big toes numb as well.  The symptoms have been going on for at least a year and a half.  She has tried chiropractor, PT but is still having significant pain.  She does endorse weakness.  She can only walk 5 blocks without having to stop due to pain.  She does feel better when  she leans forward on a shopping cart.  She denies any bowel bladder issues.  She denies any saddle anesthesia.

## 2021-06-01 NOTE — ASSESSMENT
[FreeTextEntry1] : This is a 56-year-old female here today for evaluation of her low back and leg symptoms.  She does have objective weakness findings on my exam.  Furthermore she does have symptoms concerning for lumbar radiculopathy/lumbar neurogenic claudication.  As result I would like to proceed with a lumbar MRI.  In tandem with this she should begin physical therapy focused on her back and lower extremity muscles.  I will have her follow-up in 3 to 4 weeks for repeat clinical evaluation.  I encouraged her to follow-up sooner if she has any new or worsening symptoms.  Please note that over 45 minutes of time spent in care of this patient which includes previsit preparation, in person visit, post visit documentation.

## 2021-06-04 LAB
ALMOND IGE QN: 0.12 KUA/L
DEPRECATED ALMOND IGE RAST QL: NORMAL
DEPRECATED PEANUT IGE RAST QL: NORMAL
PEANUT (RARA H) 1 IGE QN: <0.1 KUA/L
PEANUT (RARA H) 2 IGE QN: <0.1 KUA/L
PEANUT (RARA H) 3 IGE QN: <0.1 KUA/L
PEANUT (RARA H) 6 IGE QN: <0.1 KUA/L
PEANUT (RARA H) 8 IGE QN: 1.21 KUA/L
PEANUT (RARA H) 9 IGE QN: <0.1 KUA/L
PEANUT IGE QN: 0.17 KUA/L
RARA H 6 STORAGE PROTEIN (F447) CLASS: 0 (ref 0–?)
RARA H1 STORAGE PROTEIN (F422) CLASS: 0 (ref 0–?)
RARA H2 STORAGE PROTEIN (F423) CLASS: 0 (ref 0–?)
RARA H3 STORAGE PROTEIN (F424) CLASS: 0 (ref 0–?)
RARA H8 PR-10 PROTEIN (F352) CLASS: 2 (ref 0–?)
RARA H9 LIPID TRANSFERTP (F427) CLASS: 0 (ref 0–?)

## 2021-06-11 ENCOUNTER — OUTPATIENT (OUTPATIENT)
Dept: OUTPATIENT SERVICES | Facility: HOSPITAL | Age: 56
LOS: 1 days | End: 2021-06-11

## 2021-06-11 DIAGNOSIS — Z00.8 ENCOUNTER FOR OTHER GENERAL EXAMINATION: ICD-10-CM

## 2021-06-14 ENCOUNTER — NON-APPOINTMENT (OUTPATIENT)
Age: 56
End: 2021-06-14

## 2021-06-16 ENCOUNTER — APPOINTMENT (OUTPATIENT)
Dept: MRI IMAGING | Facility: CLINIC | Age: 56
End: 2021-06-16
Payer: COMMERCIAL

## 2021-06-18 ENCOUNTER — APPOINTMENT (OUTPATIENT)
Dept: MAMMOGRAPHY | Facility: IMAGING CENTER | Age: 56
End: 2021-06-18
Payer: COMMERCIAL

## 2021-06-18 ENCOUNTER — RESULT REVIEW (OUTPATIENT)
Age: 56
End: 2021-06-18

## 2021-06-18 ENCOUNTER — OUTPATIENT (OUTPATIENT)
Dept: OUTPATIENT SERVICES | Facility: HOSPITAL | Age: 56
LOS: 1 days | End: 2021-06-18
Payer: COMMERCIAL

## 2021-06-18 ENCOUNTER — APPOINTMENT (OUTPATIENT)
Dept: ULTRASOUND IMAGING | Facility: IMAGING CENTER | Age: 56
End: 2021-06-18
Payer: COMMERCIAL

## 2021-06-18 DIAGNOSIS — D17.1 BENIGN LIPOMATOUS NEOPLASM OF SKIN AND SUBCUTANEOUS TISSUE OF TRUNK: ICD-10-CM

## 2021-06-18 DIAGNOSIS — Z00.8 ENCOUNTER FOR OTHER GENERAL EXAMINATION: ICD-10-CM

## 2021-06-18 DIAGNOSIS — Z00.00 ENCOUNTER FOR GENERAL ADULT MEDICAL EXAMINATION WITHOUT ABNORMAL FINDINGS: ICD-10-CM

## 2021-06-18 PROCEDURE — 77067 SCR MAMMO BI INCL CAD: CPT

## 2021-06-18 PROCEDURE — 77067 SCR MAMMO BI INCL CAD: CPT | Mod: 26

## 2021-06-18 PROCEDURE — 76536 US EXAM OF HEAD AND NECK: CPT | Mod: 26

## 2021-06-18 PROCEDURE — 76536 US EXAM OF HEAD AND NECK: CPT

## 2021-06-18 PROCEDURE — 77063 BREAST TOMOSYNTHESIS BI: CPT | Mod: 26

## 2021-06-18 PROCEDURE — 77063 BREAST TOMOSYNTHESIS BI: CPT

## 2021-06-29 ENCOUNTER — OUTPATIENT (OUTPATIENT)
Dept: OUTPATIENT SERVICES | Facility: HOSPITAL | Age: 56
LOS: 1 days | End: 2021-06-29
Payer: COMMERCIAL

## 2021-06-29 ENCOUNTER — APPOINTMENT (OUTPATIENT)
Dept: MRI IMAGING | Facility: CLINIC | Age: 56
End: 2021-06-29
Payer: COMMERCIAL

## 2021-06-29 DIAGNOSIS — M54.9 DORSALGIA, UNSPECIFIED: ICD-10-CM

## 2021-06-29 DIAGNOSIS — Z00.8 ENCOUNTER FOR OTHER GENERAL EXAMINATION: ICD-10-CM

## 2021-06-29 PROCEDURE — 72148 MRI LUMBAR SPINE W/O DYE: CPT | Mod: 26

## 2021-06-29 PROCEDURE — 72148 MRI LUMBAR SPINE W/O DYE: CPT

## 2021-07-16 ENCOUNTER — APPOINTMENT (OUTPATIENT)
Dept: ORTHOPEDIC SURGERY | Facility: CLINIC | Age: 56
End: 2021-07-16
Payer: COMMERCIAL

## 2021-07-16 VITALS — HEART RATE: 61 BPM | DIASTOLIC BLOOD PRESSURE: 100 MMHG | SYSTOLIC BLOOD PRESSURE: 154 MMHG

## 2021-07-16 PROCEDURE — 99072 ADDL SUPL MATRL&STAF TM PHE: CPT

## 2021-07-16 PROCEDURE — 99213 OFFICE O/P EST LOW 20 MIN: CPT

## 2021-07-16 NOTE — ASSESSMENT
[FreeTextEntry1] : This is a 56-year-old female dealing with significant bilateral back and right-sided leg pain.  She does have mild to moderate canal stenosis at L4-L5 with spondylolisthesis at this level.  She is complaining of symptomatic radiculopathy involving this level.  Therefore I would like to proceed with an L4-L5 bilateral transforaminal lumbar epidural steroid injection.  She should also begin physical therapy.  I will have her follow-up in 3 to 4 weeks for repeat clinical evaluation.  I encouraged her to follow-up sooner if she has any new or worsening symptoms.

## 2021-07-16 NOTE — HISTORY OF PRESENT ILLNESS
[de-identified] : Today the patient states she is still dealing with significant low back and leg pain.  Her right-sided symptoms are worse than her left.  It does go down her posterior lateral buttock posterior lateral calf posterior lateral thigh.  She denies any bowel bladder issues.  She denies any saddle anesthesia.  The symptoms are interfering with her quality of life.\par \par 05/28/21\par This is a 56-year-old female here today for evaluation of low back pain and leg pain.  She states that her right-sided leg pain is worse than her left.  It does go down her posterior buttock and posterior thigh.  She does feel like her big toes numb as well.  The symptoms have been going on for at least a year and a half.  She has tried chiropractor, PT but is still having significant pain.  She does endorse weakness.  She can only walk 5 blocks without having to stop due to pain.  She does feel better when  she leans forward on a shopping cart.  She denies any bowel bladder issues.  She denies any saddle anesthesia.

## 2021-07-16 NOTE — PHYSICAL EXAM
[de-identified] : Lumbar Physical Exam\par \par Gait - Normal\par \par Station - Normal\par \par Sagittal balance - Normal\par \par Compensatory mechanism? - None\par \par Heel walk - Normal\par \par Toe walk - Normal\par \par Reflexes\par Patellar - normal\par Gastroc - normal\par Clonus - No\par \par Hip Exam - Normal\par \par Straight leg raise - none\par \par Pulses - 2+ dp/pt\par \par Range of motion -  reduced\par \par Sensation \par Sensation intact to light touch in L1, L2, L3, L4, L5 and S1 dermatomes bilaterally\par \par Motor\par 	IP	Quad	HS	TA	Gastroc	EHL\par Right	5/5	5/5	5/5	3+/5	5/5	4/5\par Left	5/5	5/5	5/5	5/5	5/5	5/5 [de-identified] : Lumbar radiographs\par L4-L5 spondylolisthesis noted\par Facet arthropathy noted\par Disc height loss noted\par \par Lumbar MRI reviewed\par Mild central and lateral recess stenosis noted at L4-L5 bilaterally

## 2021-07-22 ENCOUNTER — NON-APPOINTMENT (OUTPATIENT)
Age: 56
End: 2021-07-22

## 2021-07-22 ENCOUNTER — LABORATORY RESULT (OUTPATIENT)
Age: 56
End: 2021-07-22

## 2021-07-22 ENCOUNTER — APPOINTMENT (OUTPATIENT)
Dept: DERMATOLOGY | Facility: CLINIC | Age: 56
End: 2021-07-22
Payer: COMMERCIAL

## 2021-07-22 VITALS — BODY MASS INDEX: 32.14 KG/M2 | HEIGHT: 66 IN | WEIGHT: 200 LBS

## 2021-07-22 DIAGNOSIS — D23.9 OTHER BENIGN NEOPLASM OF SKIN, UNSPECIFIED: ICD-10-CM

## 2021-07-22 DIAGNOSIS — D48.5 NEOPLASM OF UNCERTAIN BEHAVIOR OF SKIN: ICD-10-CM

## 2021-07-22 PROCEDURE — 99203 OFFICE O/P NEW LOW 30 MIN: CPT | Mod: 25

## 2021-07-22 PROCEDURE — 99072 ADDL SUPL MATRL&STAF TM PHE: CPT

## 2021-07-22 PROCEDURE — 11400 EXC TR-EXT B9+MARG 0.5 CM<: CPT

## 2021-07-29 ENCOUNTER — NON-APPOINTMENT (OUTPATIENT)
Age: 56
End: 2021-07-29

## 2021-08-05 ENCOUNTER — APPOINTMENT (OUTPATIENT)
Dept: DERMATOLOGY | Facility: CLINIC | Age: 56
End: 2021-08-05
Payer: COMMERCIAL

## 2021-08-05 DIAGNOSIS — L91.0 HYPERTROPHIC SCAR: ICD-10-CM

## 2021-08-05 PROCEDURE — 99072 ADDL SUPL MATRL&STAF TM PHE: CPT

## 2021-08-05 PROCEDURE — 99212 OFFICE O/P EST SF 10 MIN: CPT

## 2021-08-05 NOTE — HISTORY OF PRESENT ILLNESS
[FreeTextEntry1] : suture removal [de-identified] : Patient here for suture removal. no issues with site. healing well.\par

## 2021-08-19 ENCOUNTER — RESULT REVIEW (OUTPATIENT)
Age: 56
End: 2021-08-19

## 2021-08-19 ENCOUNTER — APPOINTMENT (OUTPATIENT)
Dept: RADIOLOGY | Facility: CLINIC | Age: 56
End: 2021-08-19
Payer: COMMERCIAL

## 2021-08-19 ENCOUNTER — OUTPATIENT (OUTPATIENT)
Dept: OUTPATIENT SERVICES | Facility: HOSPITAL | Age: 56
LOS: 1 days | End: 2021-08-19
Payer: COMMERCIAL

## 2021-08-19 DIAGNOSIS — M54.9 DORSALGIA, UNSPECIFIED: ICD-10-CM

## 2021-08-19 PROCEDURE — 62323 NJX INTERLAMINAR LMBR/SAC: CPT

## 2021-08-20 ENCOUNTER — APPOINTMENT (OUTPATIENT)
Dept: ORTHOPEDIC SURGERY | Facility: CLINIC | Age: 56
End: 2021-08-20
Payer: COMMERCIAL

## 2021-08-20 PROCEDURE — 99072 ADDL SUPL MATRL&STAF TM PHE: CPT

## 2021-08-20 PROCEDURE — 99213 OFFICE O/P EST LOW 20 MIN: CPT

## 2021-08-20 NOTE — HISTORY OF PRESENT ILLNESS
[de-identified] : The patient is doing very well today.  She has had resolution of her back and leg symptoms.  She has taken the epidural which she does feel has helped her quite significantly.  She denies any bowel bladder issues.  She denies any saddle anesthesia.\par \par 07/16/21\par Today the patient states she is still dealing with significant low back and leg pain.  Her right-sided symptoms are worse than her left.  It does go down her posterior lateral buttock posterior lateral calf posterior lateral thigh.  She denies any bowel bladder issues.  She denies any saddle anesthesia.  The symptoms are interfering with her quality of life.\par \par 05/28/21\par This is a 56-year-old female here today for evaluation of low back pain and leg pain.  She states that her right-sided leg pain is worse than her left.  It does go down her posterior buttock and posterior thigh.  She does feel like her big toes numb as well.  The symptoms have been going on for at least a year and a half.  She has tried chiropractor, PT but is still having significant pain.  She does endorse weakness.  She can only walk 5 blocks without having to stop due to pain.  She does feel better when  she leans forward on a shopping cart.  She denies any bowel bladder issues.  She denies any saddle anesthesia.

## 2021-08-20 NOTE — PHYSICAL EXAM
[de-identified] : Lumbar Physical Exam\par \par Gait - Normal\par \par Station - Normal\par \par Sagittal balance - Normal\par \par Compensatory mechanism? - None\par \par Heel walk - Normal\par \par Toe walk - Normal\par \par Reflexes\par Patellar - normal\par Gastroc - normal\par Clonus - No\par \par Hip Exam - Normal\par \par Straight leg raise - none\par \par Pulses - 2+ dp/pt\par \par Range of motion -  reduced\par \par Sensation \par Sensation intact to light touch in L1, L2, L3, L4, L5 and S1 dermatomes bilaterally\par \par Motor\par 	IP	Quad	HS	TA	Gastroc	EHL\par Right	5/5	5/5	5/5	3+/5	5/5	4/5\par Left	5/5	5/5	5/5	5/5	5/5	5/5 [de-identified] : Lumbar radiographs\par L4-L5 spondylolisthesis noted\par Facet arthropathy noted\par Disc height loss noted\par \par Lumbar MRI reviewed\par Mild central and lateral recess stenosis noted at L4-L5 bilaterally

## 2021-08-27 ENCOUNTER — APPOINTMENT (OUTPATIENT)
Dept: INTERNAL MEDICINE | Facility: CLINIC | Age: 56
End: 2021-08-27
Payer: COMMERCIAL

## 2021-08-27 VITALS
HEIGHT: 66 IN | BODY MASS INDEX: 34.07 KG/M2 | SYSTOLIC BLOOD PRESSURE: 142 MMHG | HEART RATE: 60 BPM | DIASTOLIC BLOOD PRESSURE: 90 MMHG | RESPIRATION RATE: 14 BRPM | WEIGHT: 212 LBS | OXYGEN SATURATION: 99 %

## 2021-08-27 VITALS — SYSTOLIC BLOOD PRESSURE: 132 MMHG | DIASTOLIC BLOOD PRESSURE: 84 MMHG

## 2021-08-27 DIAGNOSIS — J01.90 ACUTE SINUSITIS, UNSPECIFIED: ICD-10-CM

## 2021-08-27 PROCEDURE — 99214 OFFICE O/P EST MOD 30 MIN: CPT

## 2021-08-27 PROCEDURE — 99072 ADDL SUPL MATRL&STAF TM PHE: CPT

## 2021-08-27 NOTE — ASSESSMENT
[FreeTextEntry1] : # sinus pressure - nasal turbinates swollen and pale violet - no redness.  Took allergy shots for 4 years in the past.  Appears to be allergic sinusitis. no evid for infectious etiology. \par trial of allegra QD and Flonase.  \par Patient advised to call for any worsening or if no resolution. \par \par # posterior neck soft tissue mass:  has been causing sig discomfort - She got US done since last visit. US report reviewed together - c/w lipoma - she would like to consider resection as causing discomfort and restricting ROM/full extension at her neck. Referring to plastic surg - Dr Krishna. \par \par # LBP:  s/p epidural with resolution of back and leg sx as per last Spine note.  She reports pain significantly improved. Has f/u pending.\par \par # Derm: s/p dermatofibroma resection. f/u prn\par \par # HTN: was to keep BP log and return to review.  - She did keep BP log -has log recorded on her phone. Multiple readings over > 1 month reviewed together - most in target range - only high for a few days when she had sinus congestion and was taking a decongestant.  Avoid decongestants. No indication for medication at this time. Continue lifestyle modifications and monitor. \par she will email log from her phone so can be printed and scanned into chart.

## 2021-08-27 NOTE — HISTORY OF PRESENT ILLNESS
[FreeTextEntry1] : elevated BP\par posterior neck soft tissue mass\par sinus pressure [de-identified] : Chart reviewed in detail in prep for todays visit. Derm, ortho, spine, and AI notes appreciated. \par \par RE posterior neck soft tissue mass:  has been causing sig discomfort - She got US done since last visit. US report reviewed together - c/w lipoma - she would like to consider resection as causing discomfort and restricting ROM/full extension at her neck \par RE LBP:  s/p epidural with resolution of back and leg sx as per last Spine note.  She reports pain significantly improved. Has f/u pending.\par RE Derm: s/p dermatofibroma resection. \par RE HTN: was to keep BP log and return to review.  - She did keep BP log -has log recorded on her phone. Multiple readings over > 1 month reviewed together - most in target range - only high for a few days when she had sinus congestion and was taking a decongestant.   \par she will email log from her phone so can be printed and scanned into chart. \par  \par

## 2021-08-27 NOTE — PHYSICAL EXAM
[No Acute Distress] : no acute distress [Normal Sclera/Conjunctiva] : normal sclera/conjunctiva [PERRL] : pupils equal round and reactive to light [EOMI] : extraocular movements intact [Supple] : supple [No Respiratory Distress] : no respiratory distress  [Regular Rhythm] : with a regular rhythm [Clear to Auscultation] : lungs were clear to auscultation bilaterally [Normal S1, S2] : normal S1 and S2 [Non Tender] : non-tender [No CVA Tenderness] : no CVA  tenderness [No Spinal Tenderness] : no spinal tenderness [Grossly Normal Strength/Tone] : grossly normal strength/tone [Normal Affect] : the affect was normal [de-identified] : minimal maxillary/frontal sinus tenderness. Nasal turbinates boggy - no erythema.  [de-identified] : medial upper back/lower neck soft tissue mass ~ 7 x 5 cm.  no erythema

## 2021-11-22 ENCOUNTER — APPOINTMENT (OUTPATIENT)
Dept: ORTHOPEDIC SURGERY | Facility: CLINIC | Age: 56
End: 2021-11-22
Payer: COMMERCIAL

## 2021-11-22 VITALS
HEIGHT: 66 IN | DIASTOLIC BLOOD PRESSURE: 94 MMHG | BODY MASS INDEX: 34.07 KG/M2 | SYSTOLIC BLOOD PRESSURE: 169 MMHG | HEART RATE: 5 BPM | WEIGHT: 212 LBS | OXYGEN SATURATION: 97 %

## 2021-11-22 PROCEDURE — 99072 ADDL SUPL MATRL&STAF TM PHE: CPT

## 2021-11-22 PROCEDURE — 99214 OFFICE O/P EST MOD 30 MIN: CPT

## 2021-11-22 NOTE — PHYSICAL EXAM
[de-identified] : Lumbar radiographs\par L4-L5 spondylolisthesis noted\par Facet arthropathy noted\par Disc height loss noted\par \par Lumbar MRI reviewed\par Mild central and lateral recess stenosis noted at L4-L5 bilaterally [de-identified] : Lumbar Physical Exam\par \par Gait - Normal\par \par Station - Normal\par \par Sagittal balance - Normal\par \par Compensatory mechanism? - None\par \par Heel walk - Normal\par \par Toe walk - Normal\par \par Reflexes\par Patellar - normal\par Gastroc - normal\par Clonus - No\par \par Hip Exam - Normal\par \par Straight leg raise - none\par \par Pulses - 2+ dp/pt\par \par Range of motion -  reduced\par \par Sensation \par Sensation intact to light touch in L1, L2, L3, L4, L5 and S1 dermatomes bilaterally\par \par Motor\par 	IP	Quad	HS	TA	Gastroc	EHL\par Right	5/5	5/5	5/5	4+/5	5/5	4/5\par Left	5/5	5/5	5/5	5/5	5/5	5/5

## 2021-11-22 NOTE — HISTORY OF PRESENT ILLNESS
[de-identified] : Today the patient states that she is still having some occasional back and leg symptoms.  She denies any bowel bladder issues.  She denies any saddle anesthesia.  She has been diligently participating physical therapy although she is still dealing with some residual back pain.  She is here today to discuss neck steps and treatment.  In terms of her leg pain she does describe pain that goes over her right posterior buttock into her posterior lateral thigh.\par \par 08/20/21\par The patient is doing very well today.  She has had resolution of her back and leg symptoms.  She has taken the epidural which she does feel has helped her quite significantly.  She denies any bowel bladder issues.  She denies any saddle anesthesia.\par \par 07/16/21\par Today the patient states she is still dealing with significant low back and leg pain.  Her right-sided symptoms are worse than her left.  It does go down her posterior lateral buttock posterior lateral calf posterior lateral thigh.  She denies any bowel bladder issues.  She denies any saddle anesthesia.  The symptoms are interfering with her quality of life.\par \par 05/28/21\par This is a 56-year-old female here today for evaluation of low back pain and leg pain.  She states that her right-sided leg pain is worse than her left.  It does go down her posterior buttock and posterior thigh.  She does feel like her big toes numb as well.  The symptoms have been going on for at least a year and a half.  She has tried chiropractor, PT but is still having significant pain.  She does endorse weakness.  She can only walk 5 blocks without having to stop due to pain.  She does feel better when  she leans forward on a shopping cart.  She denies any bowel bladder issues.  She denies any saddle anesthesia.

## 2021-11-22 NOTE — ASSESSMENT
[FreeTextEntry1] : Had a lengthy discussion with the patient in regards to her current treatment plan.  At this point I would go forward with another round of epidural steroid injections as this did help her previously.  I would encourage her to continue with physical therapy.  We discussed pros and cons of operative intervention.  She would like to hold off on any sort of aggressive treatment which I think is absolutely reasonable.  I will have her follow-up in approximate 4 to 6 weeks for repeat clinical evaluation.  I encouraged her to follow-up sooner if she has any new or worsening symptoms.

## 2021-11-29 ENCOUNTER — NON-APPOINTMENT (OUTPATIENT)
Age: 56
End: 2021-11-29

## 2021-11-30 ENCOUNTER — RESULT REVIEW (OUTPATIENT)
Age: 56
End: 2021-11-30

## 2021-11-30 ENCOUNTER — APPOINTMENT (OUTPATIENT)
Dept: INTERNAL MEDICINE | Facility: CLINIC | Age: 56
End: 2021-11-30
Payer: COMMERCIAL

## 2021-11-30 ENCOUNTER — OUTPATIENT (OUTPATIENT)
Dept: OUTPATIENT SERVICES | Facility: HOSPITAL | Age: 56
LOS: 1 days | End: 2021-11-30
Payer: COMMERCIAL

## 2021-11-30 ENCOUNTER — APPOINTMENT (OUTPATIENT)
Dept: RADIOLOGY | Facility: CLINIC | Age: 56
End: 2021-11-30
Payer: COMMERCIAL

## 2021-11-30 VITALS
HEART RATE: 67 BPM | OXYGEN SATURATION: 97 % | BODY MASS INDEX: 34.87 KG/M2 | DIASTOLIC BLOOD PRESSURE: 88 MMHG | WEIGHT: 217 LBS | RESPIRATION RATE: 16 BRPM | SYSTOLIC BLOOD PRESSURE: 154 MMHG | HEIGHT: 66 IN

## 2021-11-30 DIAGNOSIS — S69.91XA UNSPECIFIED INJURY OF RIGHT WRIST, HAND AND FINGER(S), INITIAL ENCOUNTER: ICD-10-CM

## 2021-11-30 PROCEDURE — 99214 OFFICE O/P EST MOD 30 MIN: CPT

## 2021-11-30 PROCEDURE — 99072 ADDL SUPL MATRL&STAF TM PHE: CPT

## 2021-11-30 PROCEDURE — 73140 X-RAY EXAM OF FINGER(S): CPT

## 2021-11-30 PROCEDURE — 73140 X-RAY EXAM OF FINGER(S): CPT | Mod: 26,RT

## 2021-12-02 NOTE — ADDENDUM
[FreeTextEntry1] : Addm: thumb xray report reviewed. - neg for fx or any other bony lesion - appears to be all soft tissue swelling/injury. will refer to hand specialist - may need hand PT.

## 2021-12-02 NOTE — HISTORY OF PRESENT ILLNESS
[FreeTextEntry1] : on thanksgiving day - trauma to right thumb - needs xray [de-identified] : Chart reviewed in detail in prep for today's visit.ortho,notes appreciated. \par RE right thumb: traumatic extension injury 5 days ago - \par RE posterior neck soft tissue mass:  has been causing sig discomfort - She got US done since last visit. US report reviewed together - c/w lipoma - she would like to consider resection as causing discomfort and restricting ROM/full extension at her neck. last visit referred to plastics for resection  - never heard from them so still needs to move forward on that. \par RE LBP:  recently seen by ortho - to have another round of epidural injections and continue with PT.\par RE Derm: s/p dermatofibroma resection. \par RE HTN: last visit did keep BP log -has log recorded on her phone. Multiple readings over > 1 month reviewed together - most in target range - only high for a few days when she had sinus congestion and was taking a decongestant.    Was not started on any medication\par today home /75, here in office 138/88. \par  \par

## 2021-12-02 NOTE — ASSESSMENT
[FreeTextEntry1] : cautionus use of ibuprofen\par declines flu shot\par advised COVID booster - she will go today

## 2021-12-02 NOTE — PHYSICAL EXAM
[No Acute Distress] : no acute distress [Normal Sclera/Conjunctiva] : normal sclera/conjunctiva [PERRL] : pupils equal round and reactive to light [Supple] : supple [No Respiratory Distress] : no respiratory distress  [Clear to Auscultation] : lungs were clear to auscultation bilaterally [Regular Rhythm] : with a regular rhythm [Normal S1, S2] : normal S1 and S2 [Non Tender] : non-tender [No CVA Tenderness] : no CVA  tenderness [No Spinal Tenderness] : no spinal tenderness [Grossly Normal Strength/Tone] : grossly normal strength/tone [No Rash] : no rash [de-identified] : medial upper back/lower neck soft tissue mass ~ 7 x 5 cm.  no erythema, non-tender

## 2021-12-02 NOTE — REVIEW OF SYSTEMS
[Negative] : Heme/Lymph [de-identified] : right thumb - sig edema and bruising. decreased flexion at DIP joint

## 2021-12-05 ENCOUNTER — NON-APPOINTMENT (OUTPATIENT)
Age: 56
End: 2021-12-05

## 2022-01-19 ENCOUNTER — APPOINTMENT (OUTPATIENT)
Dept: PLASTIC SURGERY | Facility: CLINIC | Age: 57
End: 2022-01-19

## 2022-01-24 ENCOUNTER — APPOINTMENT (OUTPATIENT)
Dept: ORTHOPEDIC SURGERY | Facility: CLINIC | Age: 57
End: 2022-01-24

## 2022-01-28 ENCOUNTER — RESULT REVIEW (OUTPATIENT)
Age: 57
End: 2022-01-28

## 2022-01-28 ENCOUNTER — APPOINTMENT (OUTPATIENT)
Dept: ORTHOPEDIC SURGERY | Facility: CLINIC | Age: 57
End: 2022-01-28
Payer: COMMERCIAL

## 2022-01-28 ENCOUNTER — APPOINTMENT (OUTPATIENT)
Dept: RADIOLOGY | Facility: CLINIC | Age: 57
End: 2022-01-28
Payer: COMMERCIAL

## 2022-01-28 ENCOUNTER — OUTPATIENT (OUTPATIENT)
Dept: OUTPATIENT SERVICES | Facility: HOSPITAL | Age: 57
LOS: 1 days | End: 2022-01-28
Payer: COMMERCIAL

## 2022-01-28 DIAGNOSIS — M54.9 DORSALGIA, UNSPECIFIED: ICD-10-CM

## 2022-01-28 PROCEDURE — 62323 NJX INTERLAMINAR LMBR/SAC: CPT

## 2022-01-28 PROCEDURE — 99214 OFFICE O/P EST MOD 30 MIN: CPT

## 2022-01-28 PROCEDURE — 99072 ADDL SUPL MATRL&STAF TM PHE: CPT

## 2022-01-28 NOTE — HISTORY OF PRESENT ILLNESS
[de-identified] : Today the patient states that she is still dealing with back and leg symptoms.  Right side is worse than her left.  She does describe pain which does go down her posterior lateral thigh posterior lateral buttock.  She is looking forward to getting her epidural steroid injection done today.  She denies any bowel bladder issues.  She denies any saddle anesthesia.\par \par 11/22/21\par Today the patient states that she is still having some occasional back and leg symptoms.  She denies any bowel bladder issues.  She denies any saddle anesthesia.  She has been diligently participating physical therapy although she is still dealing with some residual back pain.  She is here today to discuss neck steps and treatment.  In terms of her leg pain she does describe pain that goes over her right posterior buttock into her posterior lateral thigh.\par \par 08/20/21\par The patient is doing very well today.  She has had resolution of her back and leg symptoms.  She has taken the epidural which she does feel has helped her quite significantly.  She denies any bowel bladder issues.  She denies any saddle anesthesia.\par \par 07/16/21\par Today the patient states she is still dealing with significant low back and leg pain.  Her right-sided symptoms are worse than her left.  It does go down her posterior lateral buttock posterior lateral calf posterior lateral thigh.  She denies any bowel bladder issues.  She denies any saddle anesthesia.  The symptoms are interfering with her quality of life.\par \par 05/28/21\par This is a 56-year-old female here today for evaluation of low back pain and leg pain.  She states that her right-sided leg pain is worse than her left.  It does go down her posterior buttock and posterior thigh.  She does feel like her big toes numb as well.  The symptoms have been going on for at least a year and a half.  She has tried chiropractor, PT but is still having significant pain.  She does endorse weakness.  She can only walk 5 blocks without having to stop due to pain.  She does feel better when  she leans forward on a shopping cart.  She denies any bowel bladder issues.  She denies any saddle anesthesia.

## 2022-01-28 NOTE — PHYSICAL EXAM
[de-identified] : Lumbar Physical Exam\par \par Gait - Normal\par \par Station - Normal\par \par Sagittal balance - Normal\par \par Compensatory mechanism? - None\par \par Heel walk - Normal\par \par Toe walk - Normal\par \par Reflexes\par Patellar - normal\par Gastroc - normal\par Clonus - No\par \par Hip Exam - Normal\par \par Straight leg raise - none\par \par Pulses - 2+ dp/pt\par \par Range of motion -  reduced\par \par Sensation \par Sensation intact to light touch in L1, L2, L3, L4, L5 and S1 dermatomes bilaterally\par \par Motor\par 	IP	Quad	HS	TA	Gastroc	EHL\par Right	5/5	5/5	5/5	4+/5	5/5	4/5\par Left	5/5	5/5	5/5	5/5	5/5	5/5 [de-identified] : Lumbar radiographs\par L4-L5 spondylolisthesis noted\par Facet arthropathy noted\par Disc height loss noted\par \par Lumbar MRI reviewed\par Mild central and lateral recess stenosis noted at L4-L5 bilaterally

## 2022-01-28 NOTE — ASSESSMENT
[FreeTextEntry1] : This is a 56-year-old female with a history of low back and leg symptoms.  At this point we will continue with our plan for conservative care.  This will include an epidural steroid injection as well as physical therapy.  She can take Tylenol and ibuprofen as well for pain relief.  She can take tizanidine as needed as well.  I will have her follow-up in approximately 6 weeks for repeat clinical evaluation.  I encouraged her to follow-up sooner if she has any new or worsening symptoms.

## 2022-01-31 ENCOUNTER — NON-APPOINTMENT (OUTPATIENT)
Age: 57
End: 2022-01-31

## 2022-04-06 ENCOUNTER — APPOINTMENT (OUTPATIENT)
Dept: ORTHOPEDIC SURGERY | Facility: CLINIC | Age: 57
End: 2022-04-06
Payer: COMMERCIAL

## 2022-04-06 VITALS
DIASTOLIC BLOOD PRESSURE: 83 MMHG | WEIGHT: 210 LBS | SYSTOLIC BLOOD PRESSURE: 129 MMHG | HEART RATE: 64 BPM | BODY MASS INDEX: 33.75 KG/M2 | HEIGHT: 66 IN

## 2022-04-06 PROCEDURE — 99214 OFFICE O/P EST MOD 30 MIN: CPT

## 2022-04-06 RX ORDER — TIZANIDINE 2 MG/1
2 TABLET ORAL EVERY 6 HOURS
Qty: 40 | Refills: 0 | Status: ACTIVE | COMMUNITY
Start: 2022-04-06 | End: 1900-01-01

## 2022-04-06 NOTE — HISTORY OF PRESENT ILLNESS
[de-identified] : Today the patient states that overall she is doing better.  She did get an epidural steroid injection.  This significantly helped her symptoms for approximately 1 to 2 weeks.  She did have a return of some of her radicular pain.  Currently she is looking forward to working with physical therapy to regain strength and mobility.  She denies any bowel bladder issues.  She denies any saddle anesthesia.\par \par 01/28/22\par Today the patient states that she is still dealing with back and leg symptoms.  Right side is worse than her left.  She does describe pain which does go down her posterior lateral thigh posterior lateral buttock.  She is looking forward to getting her epidural steroid injection done today.  She denies any bowel bladder issues.  She denies any saddle anesthesia.\par \par 11/22/21\par Today the patient states that she is still having some occasional back and leg symptoms.  She denies any bowel bladder issues.  She denies any saddle anesthesia.  She has been diligently participating physical therapy although she is still dealing with some residual back pain.  She is here today to discuss neck steps and treatment.  In terms of her leg pain she does describe pain that goes over her right posterior buttock into her posterior lateral thigh.\par \par 08/20/21\par The patient is doing very well today.  She has had resolution of her back and leg symptoms.  She has taken the epidural which she does feel has helped her quite significantly.  She denies any bowel bladder issues.  She denies any saddle anesthesia.\par \par 07/16/21\par Today the patient states she is still dealing with significant low back and leg pain.  Her right-sided symptoms are worse than her left.  It does go down her posterior lateral buttock posterior lateral calf posterior lateral thigh.  She denies any bowel bladder issues.  She denies any saddle anesthesia.  The symptoms are interfering with her quality of life.\par \par 05/28/21\par This is a 56-year-old female here today for evaluation of low back pain and leg pain.  She states that her right-sided leg pain is worse than her left.  It does go down her posterior buttock and posterior thigh.  She does feel like her big toes numb as well.  The symptoms have been going on for at least a year and a half.  She has tried chiropractor, PT but is still having significant pain.  She does endorse weakness.  She can only walk 5 blocks without having to stop due to pain.  She does feel better when  she leans forward on a shopping cart.  She denies any bowel bladder issues.  She denies any saddle anesthesia.

## 2022-04-06 NOTE — PHYSICAL EXAM
[de-identified] : Lumbar Physical Exam\par \par Gait - Normal\par \par Station - Normal\par \par Sagittal balance - Normal\par \par Compensatory mechanism? - None\par \par Heel walk - Normal\par \par Toe walk - Normal\par \par Reflexes\par Patellar - normal\par Gastroc - normal\par Clonus - No\par \par Hip Exam - Normal\par \par Straight leg raise - none\par \par Pulses - 2+ dp/pt\par \par Range of motion -  reduced\par \par Sensation \par Sensation intact to light touch in L1, L2, L3, L4, L5 and S1 dermatomes bilaterally\par \par Motor\par 	IP	Quad	HS	TA	Gastroc	EHL\par Right	5/5	5/5	5/5	5/5	5/5	4/5\par Left	5/5	5/5	5/5	5/5	5/5	5/5 [de-identified] : Lumbar radiographs\par L4-L5 spondylolisthesis noted\par Facet arthropathy noted\par Disc height loss noted\par \par Lumbar MRI reviewed\par Mild central and lateral recess stenosis noted at L4-L5 bilaterally

## 2022-04-08 ENCOUNTER — APPOINTMENT (OUTPATIENT)
Dept: INTERNAL MEDICINE | Facility: CLINIC | Age: 57
End: 2022-04-08
Payer: COMMERCIAL

## 2022-04-08 ENCOUNTER — RESULT REVIEW (OUTPATIENT)
Age: 57
End: 2022-04-08

## 2022-04-08 VITALS
HEIGHT: 66 IN | SYSTOLIC BLOOD PRESSURE: 164 MMHG | RESPIRATION RATE: 15 BRPM | BODY MASS INDEX: 34.07 KG/M2 | WEIGHT: 212 LBS | OXYGEN SATURATION: 98 % | DIASTOLIC BLOOD PRESSURE: 90 MMHG | HEART RATE: 77 BPM

## 2022-04-08 DIAGNOSIS — Z12.39 ENCOUNTER FOR OTHER SCREENING FOR MALIGNANT NEOPLASM OF BREAST: ICD-10-CM

## 2022-04-08 PROCEDURE — 99214 OFFICE O/P EST MOD 30 MIN: CPT

## 2022-04-09 NOTE — PHYSICAL EXAM
[No Acute Distress] : no acute distress [Normal Appearance] : normal in appearance [No Masses] : no palpable masses [No Nipple Discharge] : no nipple discharge [No Axillary Lymphadenopathy] : no axillary lymphadenopathy [de-identified] : no skin changes. -  Left breast ~ 3 o'clock - ~ 1.5 cm keloid scar at site of dermatofibroma excision.  [de-identified] : central upper back soft tissue swelling

## 2022-04-09 NOTE — ASSESSMENT
[FreeTextEntry1] : RE upper back soft tissue swelling: suspected lipoma.  rx for US reprinted - she will call to schedule appt. \par Left breast pain: no nodules apprec on breast exam - sending for mammo and sono to r/o non-palpable lesion as pain has been present for 8 months and is increasing. Also referred to breast specialist. \par f/u after imaging and consults completed.

## 2022-04-09 NOTE — HISTORY OF PRESENT ILLNESS
[FreeTextEntry8] : cc: left breast pain\par Went to Derm to have skin lesion on left breast resected  - in Aug of 2021 - removed - path: small  dermatofibroma ~ 3 o cloclk  -soon after began to develop intermittent left breast pain - like an ache - has gotten worse  recently so came in for further evaluation. \par Also informs me she never went for US of suspected posterior neck lipoma nor did she get axillary US on left to eval for lipoma.

## 2022-05-04 ENCOUNTER — APPOINTMENT (OUTPATIENT)
Dept: SURGICAL ONCOLOGY | Facility: CLINIC | Age: 57
End: 2022-05-04
Payer: COMMERCIAL

## 2022-05-04 VITALS
RESPIRATION RATE: 15 BRPM | HEIGHT: 66 IN | DIASTOLIC BLOOD PRESSURE: 97 MMHG | OXYGEN SATURATION: 99 % | WEIGHT: 212 LBS | TEMPERATURE: 96.5 F | BODY MASS INDEX: 34.07 KG/M2 | SYSTOLIC BLOOD PRESSURE: 163 MMHG | HEART RATE: 61 BPM

## 2022-05-04 PROCEDURE — 99204 OFFICE O/P NEW MOD 45 MIN: CPT | Mod: 25

## 2022-05-04 PROCEDURE — 11900 INJECT SKIN LESIONS </W 7: CPT

## 2022-05-04 NOTE — HISTORY OF PRESENT ILLNESS
[de-identified] : Patient is a 56 y/o female who presents an initial consultation. She is status post left breast excision by her dermatologist Dr. Tio Garcia on 7/22/21. Pathology showed hypertrophic scar.\par She presents with intermittent left breast pain at the site of the prior left breast excision. \par \par Bilateral mammogram performed on 6/18/21 showed no mammographic evidence of malignancy. (BI-RADS 2)\par \par Patient has a history of right breast biopsy in May 2016 reportedly benign and a left excisional biopsy at the age of 15- benign.\par Denies any family history of breast cancer. \par \par Denies palpable breast masses, nipple discharge, nipple retraction/inversion, or skin changes.

## 2022-05-04 NOTE — ASSESSMENT
[FreeTextEntry1] : Left breast pain likely secondary to keloid formation status post recent skin excision\par Keloid injected with 1 cc Kenalog 10\par Recommend vitamin E and/or primrose oil as needed for breast pain which may likely also be hormonal\par Continue yearly breast imaging June 2022\par RTO 3 months

## 2022-05-04 NOTE — CONSULT LETTER
[Dear  ___] : Dear  [unfilled], [Consult Letter:] : I had the pleasure of evaluating your patient, [unfilled]. [Please see my note below.] : Please see my note below. [Sincerely,] : Sincerely, [FreeTextEntry3] : Karlos Cardenas MD FACS\par

## 2022-05-04 NOTE — PROCEDURE
[FreeTextEntry1] : 1 cc of Kenalog-10 injected into left breast keloid under sterile conditions.\par Sterile dressing applied.

## 2022-05-24 ENCOUNTER — APPOINTMENT (OUTPATIENT)
Dept: INTERNAL MEDICINE | Facility: CLINIC | Age: 57
End: 2022-05-24

## 2022-06-15 ENCOUNTER — NON-APPOINTMENT (OUTPATIENT)
Age: 57
End: 2022-06-15

## 2022-06-15 ENCOUNTER — APPOINTMENT (OUTPATIENT)
Dept: OPHTHALMOLOGY | Facility: CLINIC | Age: 57
End: 2022-06-15
Payer: COMMERCIAL

## 2022-06-15 PROCEDURE — 92310E: CUSTOM

## 2022-06-15 PROCEDURE — 92004 COMPRE OPH EXAM NEW PT 1/>: CPT

## 2022-07-01 ENCOUNTER — NON-APPOINTMENT (OUTPATIENT)
Age: 57
End: 2022-07-01

## 2022-07-01 ENCOUNTER — APPOINTMENT (OUTPATIENT)
Dept: OPHTHALMOLOGY | Facility: CLINIC | Age: 57
End: 2022-07-01

## 2022-07-01 PROCEDURE — 92331: CPT | Mod: NC

## 2022-07-15 ENCOUNTER — APPOINTMENT (OUTPATIENT)
Dept: INTERNAL MEDICINE | Facility: CLINIC | Age: 57
End: 2022-07-15

## 2022-07-15 ENCOUNTER — NON-APPOINTMENT (OUTPATIENT)
Age: 57
End: 2022-07-15

## 2022-07-15 VITALS
OXYGEN SATURATION: 98 % | WEIGHT: 212 LBS | SYSTOLIC BLOOD PRESSURE: 158 MMHG | DIASTOLIC BLOOD PRESSURE: 100 MMHG | HEIGHT: 66 IN | BODY MASS INDEX: 34.07 KG/M2 | HEART RATE: 64 BPM

## 2022-07-15 DIAGNOSIS — Z00.00 ENCOUNTER FOR GENERAL ADULT MEDICAL EXAMINATION W/OUT ABNORMAL FINDINGS: ICD-10-CM

## 2022-07-15 PROCEDURE — 99396 PREV VISIT EST AGE 40-64: CPT

## 2022-07-15 PROCEDURE — G0444 DEPRESSION SCREEN ANNUAL: CPT | Mod: 59

## 2022-07-15 PROCEDURE — G0442 ANNUAL ALCOHOL SCREEN 15 MIN: CPT

## 2022-07-15 RX ORDER — FEXOFENADINE HYDROCHLORIDE 180 MG/1
180 TABLET ORAL
Qty: 1 | Refills: 0 | Status: DISCONTINUED | COMMUNITY
Start: 2021-08-27 | End: 2022-07-15

## 2022-07-15 RX ORDER — AMOXICILLIN AND CLAVULANATE POTASSIUM 875; 125 MG/1; MG/1
875-125 TABLET, COATED ORAL TWICE DAILY
Qty: 20 | Refills: 0 | Status: DISCONTINUED | COMMUNITY
Start: 2021-06-14 | End: 2022-07-15

## 2022-07-15 RX ORDER — FLUTICASONE PROPIONATE 50 UG/1
50 SPRAY, METERED NASAL DAILY
Qty: 1 | Refills: 5 | Status: ACTIVE | COMMUNITY
Start: 2021-08-27 | End: 1900-01-01

## 2022-07-15 RX ORDER — TIZANIDINE 2 MG/1
2 TABLET ORAL EVERY 6 HOURS
Qty: 28 | Refills: 0 | Status: DISCONTINUED | COMMUNITY
Start: 2022-01-28 | End: 2022-07-15

## 2022-07-15 RX ORDER — IBUPROFEN 600 MG/1
600 TABLET, FILM COATED ORAL 3 TIMES DAILY
Qty: 42 | Refills: 0 | Status: DISCONTINUED | COMMUNITY
Start: 2021-05-28 | End: 2022-07-15

## 2022-07-20 ENCOUNTER — APPOINTMENT (OUTPATIENT)
Dept: CARDIOLOGY | Facility: CLINIC | Age: 57
End: 2022-07-20

## 2022-07-20 VITALS
BODY MASS INDEX: 34.22 KG/M2 | HEART RATE: 62 BPM | DIASTOLIC BLOOD PRESSURE: 96 MMHG | OXYGEN SATURATION: 98 % | WEIGHT: 212 LBS | SYSTOLIC BLOOD PRESSURE: 140 MMHG

## 2022-07-20 LAB
ALBUMIN SERPL ELPH-MCNC: 4.5 G/DL
ALP BLD-CCNC: 66 U/L
ALT SERPL-CCNC: 13 U/L
ANION GAP SERPL CALC-SCNC: 12 MMOL/L
AST SERPL-CCNC: 13 U/L
BASOPHILS # BLD AUTO: 0.05 K/UL
BASOPHILS NFR BLD AUTO: 0.9 %
BILIRUB SERPL-MCNC: 0.4 MG/DL
BUN SERPL-MCNC: 14 MG/DL
CALCIUM SERPL-MCNC: 10 MG/DL
CHLORIDE SERPL-SCNC: 106 MMOL/L
CHOLEST SERPL-MCNC: 191 MG/DL
CO2 SERPL-SCNC: 26 MMOL/L
CREAT SERPL-MCNC: 0.89 MG/DL
EGFR: 75 ML/MIN/1.73M2
EOSINOPHIL # BLD AUTO: 0.07 K/UL
EOSINOPHIL NFR BLD AUTO: 1.2 %
ESTIMATED AVERAGE GLUCOSE: 108 MG/DL
GLUCOSE SERPL-MCNC: 95 MG/DL
HBA1C MFR BLD HPLC: 5.4 %
HCT VFR BLD CALC: 42.7 %
HDLC SERPL-MCNC: 51 MG/DL
HGB BLD-MCNC: 12.6 G/DL
IMM GRANULOCYTES NFR BLD AUTO: 0.2 %
LDLC SERPL CALC-MCNC: 131 MG/DL
LYMPHOCYTES # BLD AUTO: 2.84 K/UL
LYMPHOCYTES NFR BLD AUTO: 49.7 %
MAN DIFF?: NORMAL
MCHC RBC-ENTMCNC: 27.3 PG
MCHC RBC-ENTMCNC: 29.5 GM/DL
MCV RBC AUTO: 92.4 FL
MONOCYTES # BLD AUTO: 0.39 K/UL
MONOCYTES NFR BLD AUTO: 6.8 %
NEUTROPHILS # BLD AUTO: 2.35 K/UL
NEUTROPHILS NFR BLD AUTO: 41.2 %
NONHDLC SERPL-MCNC: 141 MG/DL
PLATELET # BLD AUTO: 269 K/UL
POTASSIUM SERPL-SCNC: 5.2 MMOL/L
PROT SERPL-MCNC: 8.3 G/DL
RBC # BLD: 4.62 M/UL
RBC # FLD: 13.2 %
SODIUM SERPL-SCNC: 145 MMOL/L
TRIGL SERPL-MCNC: 49 MG/DL
TSH SERPL-ACNC: 1.63 UIU/ML
WBC # FLD AUTO: 5.71 K/UL

## 2022-07-20 PROCEDURE — 99214 OFFICE O/P EST MOD 30 MIN: CPT

## 2022-07-20 PROCEDURE — 99204 OFFICE O/P NEW MOD 45 MIN: CPT

## 2022-07-20 NOTE — HISTORY OF PRESENT ILLNESS
[FreeTextEntry1] : 57 year old female being seen in cardiac evaluation for apparent white coat hypertension.  She is in good general health with no history of heart disease or any other significant problems.   She had been normotensive until this spring when she had a series of BP's which averaged 160/90.  She started taking her BP at home and reports it averages 125/80.  She has been started on Amlodipine 2.5 mg a few days ago. \par \par She is overweight with a BMI about 34, but there has been no change in her weight this year.

## 2022-07-20 NOTE — DISCUSSION/SUMMARY
[FreeTextEntry1] : In summary Ms Yañez is a 57 year old female with a 6 month history of elevated office blood pressures.  She is asymptomatic.  Her exam shows a BP of 140/96, clear lungs and a normal cardiac exam.  A recent ECG in her internist's office is normal.\par \par I told her as long as her home readings are normal she does not need to increase her medication.\par \par She will be scheduled for an echocardiogram to see if there is evidence of LVH.

## 2022-07-20 NOTE — HEALTH RISK ASSESSMENT
[Patient reported mammogram was normal] : Patient reported mammogram was normal [Patient reported PAP Smear was normal] : Patient reported PAP Smear was normal [Patient reported colonoscopy was normal] : Patient reported colonoscopy was normal [Never] : Never [2 - 4 times a month (2 pts)] : 2-4 times a month (2 points) [1 or 2 (0 pts)] : 1 or 2 (0 points) [Never (0 pts)] : Never (0 points) [No falls in past year] : Patient reported no falls in the past year [HIV Test offered] : HIV Test offered [Hepatitis C test offered] : Hepatitis C test offered [None] : None [With Family] : lives with family [] :  [Feels Safe at Home] : Feels safe at home [Fully functional (bathing, dressing, toileting, transferring, walking, feeding)] : Fully functional (bathing, dressing, toileting, transferring, walking, feeding) [Fully functional (using the telephone, shopping, preparing meals, housekeeping, doing laundry, using] : Fully functional and needs no help or supervision to perform IADLs (using the telephone, shopping, preparing meals, housekeeping, doing laundry, using transportation, managing medications and managing finances) [Smoke Detector] : smoke detector [Carbon Monoxide Detector] : carbon monoxide detector [Seat Belt] :  uses seat belt [With Patient/Caregiver] : , with patient/caregiver [Yes] : Yes [0] : 2) Feeling down, depressed, or hopeless: Not at all (0) [PHQ-2 Negative - No further assessment needed] : PHQ-2 Negative - No further assessment needed [Audit-CScore] : 2 [de-identified] : walking - cardio [de-identified] : [employer] [[id number]] : 0 [EyeExamDate] : 07/22 [Change in mental status noted] : No change in mental status noted [Reports changes in hearing] : Reports no changes in hearing [Reports changes in vision] : Reports no changes in vision [Guns at Home] : no guns at home [MammogramDate] : 06/21 [MammogramComments] : has appt pending -  [PapSmearDate] : 12/20 [BoneDensityComments] : na [ColonoscopyDate] : 01/13 [de-identified] : [facility]  [AdvancecareDate] : 07/22

## 2022-07-20 NOTE — HISTORY OF PRESENT ILLNESS
[FreeTextEntry1] : annual CPE [de-identified] : 57 yo F with h/o HTN, HLD, LBP, posterior neck mass with US c/w lipoma, s/p resection of breast lesion c/w dermatofibroma\par \par breast much better since seeing Dr Cardenas and getting injection - is also taking evening primrose oil \par \par keeps BP log at home - kept daily for over a month and readings were in target range. has not yet brought BP machine in to check\par Reports she has noticed when she squats down for awhile and then gets up, feels lightheaded for about 20 sec and then it passes. Also occurs when she bends over and then straightens up. No associated palpitations, SOB, syncope or near syncope. Not a sensation of spinning. \par

## 2022-07-20 NOTE — PHYSICAL EXAM
[No Acute Distress] : no acute distress [Well-Appearing] : well-appearing [Normal Sclera/Conjunctiva] : normal sclera/conjunctiva [PERRL] : pupils equal round and reactive to light [EOMI] : extraocular movements intact [No Lymphadenopathy] : no lymphadenopathy [Supple] : supple [No Respiratory Distress] : no respiratory distress  [Clear to Auscultation] : lungs were clear to auscultation bilaterally [Normal Rate] : normal rate  [Regular Rhythm] : with a regular rhythm [No Edema] : there was no peripheral edema [Normal Appearance] : normal in appearance [No Masses] : no palpable masses [No Nipple Discharge] : no nipple discharge [No Axillary Lymphadenopathy] : no axillary lymphadenopathy [Soft] : abdomen soft [Non Tender] : non-tender [Normal Axillary Nodes] : no axillary lymphadenopathy [Normal Anterior Cervical Nodes] : no anterior cervical lymphadenopathy [No CVA Tenderness] : no CVA  tenderness [No Spinal Tenderness] : no spinal tenderness [Grossly Normal Strength/Tone] : grossly normal strength/tone [No Rash] : no rash [Coordination Grossly Intact] : coordination grossly intact [Deep Tendon Reflexes (DTR)] : deep tendon reflexes were 2+ and symmetric [Normal Insight/Judgement] : insight and judgment were intact [de-identified] : no skin changes. -  Left breast ~ 3 o'clock - ~ 1.5 cm keloid scar at site of dermatofibroma excision.  [de-identified] : central upper back soft tissue swelling

## 2022-07-20 NOTE — ASSESSMENT
[FreeTextEntry1] : 59 yo F with h/o HTN, HLD, LBP, posterior neck mass with US c/w lipoma, s/p resection of breast lesion c/w dermatofibroma\par \par RE breast lesion: breast much better since seeing Dr Cardenas and getting injection - is also taking evening primrose oil . cont current care\par RE HTN: although keeps BP log at home  (kept daily for over a month) and readings are in target range, has not yet brought BP machine in to check for accuracy.  Discussed options at this point - BP quite elevated today and suspect this may not just be white coat HTN. Agreed to begin low dose BP medication today - also referring to cardiology for eval and ECHO - r/o LVH\par Begin Amlodipine 2.5 mg QD - keep BP log and f/u\par Brief postural lightheadedness: Reports she has noticed when she squats down for awhile and then gets up, feels lightheaded for about 20 sec and then it passes. Also occurs when she bends over and then straightens up. No associated palpitations, SOB, syncope or near syncope. Not a sensation of spinning. \par seems most c/w very mild autonomic dysfunction but can also be address at cardiology. \par RE occasional constipation: increase fluids, whole grains, fruits and vegetables. can add miralax for constipation as needed.\par Referring to GI as due for repeat colonoscopy. \par labs today\par \par Annual alcohol screen completed this visit. Alcohol use within healthy limits.  Healthy drinking guidelines shared with patient (Male no more than 4 SSD on any day, no more than 14 SSD per week; Female and male overage 65 no more than 3 SSD on any day, no more than 7 per week). Positive reinforcement provided given patient currently within healthy guidelines. \par \par Annual depression screen completed this visit and reviewed.  Screening not suggestive of diagnosis of MDD. \par

## 2022-08-23 ENCOUNTER — OUTPATIENT (OUTPATIENT)
Dept: OUTPATIENT SERVICES | Facility: HOSPITAL | Age: 57
LOS: 1 days | End: 2022-08-23
Payer: COMMERCIAL

## 2022-08-23 ENCOUNTER — APPOINTMENT (OUTPATIENT)
Dept: MAMMOGRAPHY | Facility: IMAGING CENTER | Age: 57
End: 2022-08-23

## 2022-08-23 ENCOUNTER — RESULT REVIEW (OUTPATIENT)
Age: 57
End: 2022-08-23

## 2022-08-23 ENCOUNTER — APPOINTMENT (OUTPATIENT)
Dept: ULTRASOUND IMAGING | Facility: IMAGING CENTER | Age: 57
End: 2022-08-23

## 2022-08-23 DIAGNOSIS — N64.4 MASTODYNIA: ICD-10-CM

## 2022-08-23 DIAGNOSIS — Z12.39 ENCOUNTER FOR OTHER SCREENING FOR MALIGNANT NEOPLASM OF BREAST: ICD-10-CM

## 2022-08-23 PROCEDURE — G0279: CPT | Mod: 26

## 2022-08-23 PROCEDURE — 76641 ULTRASOUND BREAST COMPLETE: CPT | Mod: 26,LT

## 2022-08-23 PROCEDURE — 77066 DX MAMMO INCL CAD BI: CPT | Mod: 26

## 2022-08-23 PROCEDURE — 77066 DX MAMMO INCL CAD BI: CPT

## 2022-08-23 PROCEDURE — G0279: CPT

## 2022-08-23 PROCEDURE — 76641 ULTRASOUND BREAST COMPLETE: CPT

## 2022-08-24 ENCOUNTER — APPOINTMENT (OUTPATIENT)
Dept: CARDIOLOGY | Facility: CLINIC | Age: 57
End: 2022-08-24

## 2022-08-24 ENCOUNTER — NON-APPOINTMENT (OUTPATIENT)
Age: 57
End: 2022-08-24

## 2022-08-24 PROCEDURE — 93306 TTE W/DOPPLER COMPLETE: CPT

## 2022-09-02 ENCOUNTER — APPOINTMENT (OUTPATIENT)
Dept: INTERNAL MEDICINE | Facility: CLINIC | Age: 57
End: 2022-09-02

## 2022-09-02 VITALS
HEART RATE: 64 BPM | WEIGHT: 214 LBS | OXYGEN SATURATION: 98 % | BODY MASS INDEX: 34.39 KG/M2 | SYSTOLIC BLOOD PRESSURE: 134 MMHG | HEIGHT: 66 IN | DIASTOLIC BLOOD PRESSURE: 88 MMHG

## 2022-09-02 DIAGNOSIS — I10 ESSENTIAL (PRIMARY) HYPERTENSION: ICD-10-CM

## 2022-09-02 DIAGNOSIS — M54.30 SCIATICA, UNSPECIFIED SIDE: ICD-10-CM

## 2022-09-02 PROCEDURE — 99214 OFFICE O/P EST MOD 30 MIN: CPT

## 2022-09-02 RX ORDER — IBUPROFEN 800 MG/1
800 TABLET, FILM COATED ORAL 3 TIMES DAILY
Qty: 42 | Refills: 0 | Status: DISCONTINUED | COMMUNITY
Start: 2022-04-06 | End: 2022-09-02

## 2022-09-04 NOTE — PHYSICAL EXAM
[No Acute Distress] : no acute distress [Well-Appearing] : well-appearing [Normal Sclera/Conjunctiva] : normal sclera/conjunctiva [EOMI] : extraocular movements intact [No Respiratory Distress] : no respiratory distress  [Clear to Auscultation] : lungs were clear to auscultation bilaterally [Normal Rate] : normal rate  [Regular Rhythm] : with a regular rhythm [No CVA Tenderness] : no CVA  tenderness [No Spinal Tenderness] : no spinal tenderness [Coordination Grossly Intact] : coordination grossly intact [No Focal Deficits] : no focal deficits [Deep Tendon Reflexes (DTR)] : deep tendon reflexes were 2+ and symmetric [de-identified] : para spinal tenderness in the lumbar region. straight leg positive on the right and some pain at the hip with external rotation

## 2022-09-04 NOTE — DATA REVIEWED
[FreeTextEntry1] : Reviewed BP log brought in by Ms J\par \par Also checked her machine\par \par 154/110 - her machine\par 145/82 - manual reading

## 2022-09-04 NOTE — HISTORY OF PRESENT ILLNESS
[FreeTextEntry1] : elevated BP [de-identified] : 57 yo F with h/o HTN, HLD, LBP, posterior neck mass with US c/w lipoma, s/p resection of breast lesion c/w dermatofibroma\par \par RE HTN: Recently started on amlodipine 2.5 mg QD for elevated BP in office for about a year.  Home BP's have been mostly normal so was sent for ECHO to help guide care.  ECHO show concentric LV remodeling, effect of longstanding HTN.   \par Reports she came back from [place] with a viral infection. COVID test was negative. \par Lasted a few days but feeling better now.  \par Reports she has a h/x of right lower back pain - has seen ortho, Dr Conley in the past. Was sent for PT which really helped her a great deal - especially the water tx.  She has recently had a flare of the pain. starts in the right buttock and wraps around.   No weakness, no numbness or sensory changes.  Pain tends to occur with certain movements. \par \par prior (7/15/22)\par RE breast lesion: breast much better since seeing Dr Cardenas and getting injection - is also taking evening primrose oil . cont current care\par RE HTN: although keeps BP log at home  (kept daily for over a month) and readings are in target range, has not yet brought BP machine in to check for accuracy.  Discussed options at this point - BP quite elevated today and suspect this may not just be white coat HTN. Agreed to begin low dose BP medication today - also referring to cardiology for eval and ECHO - r/o LVH\par Begin Amlodipine 2.5 mg QD - keep BP log and f/u\par Brief postural lightheadedness: Reports she has noticed when she squats down for awhile and then gets up, feels lightheaded for about 20 sec and then it passes. Also occurs when she bends over and then straightens up. No associated palpitations, SOB, syncope or near syncope. Not a sensation of spinning. \par seems most c/w very mild autonomic dysfunction but can also be address at cardiology. \par RE occasional constipation: increase fluids, whole grains, fruits and vegetables. can add miralax for constipation as needed.\par Referring to GI as due for repeat colonoscopy. \par

## 2022-09-04 NOTE — ASSESSMENT
[FreeTextEntry1] : 57 yo F with h/o HTN, HLD, LBP, posterior neck mass with US c/w lipoma, s/p resection of breast lesion c/w dermatofibroma\par \par RE HTN: Recently started on amlodipine 2.5 mg QD for elevated BP in office for about a year.  Todays BP here in the office much improved. Home BP's had been mostly normal so was sent for ECHO to help guide care.  ECHO show concentric LV remodeling, effect of longstanding HTN.   Discussed significance of ECHO results and urged her to remain on BP medication for now. Also encouraged low Na diet and wt loss. F/u in 3 months for repeat BP check - reassess if dosing adequate. \par  \par RE LBP: suggestive of lumber strain but also suspect OA at the hip. She will make appt to see ortho.  Meanwhile, suggest moist heat, NSAIDS, avoid heaving lifting.  GI precautions for NSAIDS reviewed in detail. Also giving Rx for PT which has helped in the past.

## 2022-09-11 ENCOUNTER — NON-APPOINTMENT (OUTPATIENT)
Age: 57
End: 2022-09-11

## 2022-09-11 RX ORDER — NAPROXEN 500 MG/1
500 TABLET ORAL
Qty: 28 | Refills: 2 | Status: ACTIVE | COMMUNITY
Start: 2022-09-02 | End: 1900-01-01

## 2022-09-26 ENCOUNTER — APPOINTMENT (OUTPATIENT)
Dept: ORTHOPEDIC SURGERY | Facility: CLINIC | Age: 57
End: 2022-09-26

## 2022-09-26 PROCEDURE — 99214 OFFICE O/P EST MOD 30 MIN: CPT

## 2022-09-26 RX ORDER — DICLOFENAC SODIUM 75 MG/1
75 TABLET, DELAYED RELEASE ORAL
Qty: 28 | Refills: 0 | Status: ACTIVE | COMMUNITY
Start: 2022-09-26 | End: 1900-01-01

## 2022-09-26 NOTE — HISTORY OF PRESENT ILLNESS
[de-identified] : Today the patient states that she has had a return of her back and right leg symptoms.  She does feel like she has weakness in her right leg.  Over the past month after her fall she has had worsening of her overall symptoms.  She is here today to discuss neck steps in her care as she is somewhat frustrated with this return of her symptoms.  She denies any bowel bladder issues.  She denies any saddle anesthesia.\par \par 04/22\par Today the patient states that overall she is doing better.  She did get an epidural steroid injection.  This significantly helped her symptoms for approximately 1 to 2 weeks.  She did have a return of some of her radicular pain.  Currently she is looking forward to working with physical therapy to regain strength and mobility.  She denies any bowel bladder issues.  She denies any saddle anesthesia.\par \par 01/28/22\par Today the patient states that she is still dealing with back and leg symptoms.  Right side is worse than her left.  She does describe pain which does go down her posterior lateral thigh posterior lateral buttock.  She is looking forward to getting her epidural steroid injection done today.  She denies any bowel bladder issues.  She denies any saddle anesthesia.\par \par 11/22/21\par Today the patient states that she is still having some occasional back and leg symptoms.  She denies any bowel bladder issues.  She denies any saddle anesthesia.  She has been diligently participating physical therapy although she is still dealing with some residual back pain.  She is here today to discuss neck steps and treatment.  In terms of her leg pain she does describe pain that goes over her right posterior buttock into her posterior lateral thigh.\par \par 08/20/21\par The patient is doing very well today.  She has had resolution of her back and leg symptoms.  She has taken the epidural which she does feel has helped her quite significantly.  She denies any bowel bladder issues.  She denies any saddle anesthesia.\par \par 07/16/21\par Today the patient states she is still dealing with significant low back and leg pain.  Her right-sided symptoms are worse than her left.  It does go down her posterior lateral buttock posterior lateral calf posterior lateral thigh.  She denies any bowel bladder issues.  She denies any saddle anesthesia.  The symptoms are interfering with her quality of life.\par \par 05/28/21\par This is a 56-year-old female here today for evaluation of low back pain and leg pain.  She states that her right-sided leg pain is worse than her left.  It does go down her posterior buttock and posterior thigh.  She does feel like her big toes numb as well.  The symptoms have been going on for at least a year and a half.  She has tried chiropractor, PT but is still having significant pain.  She does endorse weakness.  She can only walk 5 blocks without having to stop due to pain.  She does feel better when  she leans forward on a shopping cart.  She denies any bowel bladder issues.  She denies any saddle anesthesia.

## 2022-09-26 NOTE — PHYSICAL EXAM
[de-identified] : Lumbar Physical Exam\par \par Gait - Normal\par \par Station - Normal\par \par Sagittal balance - Normal\par \par Compensatory mechanism? - None\par \par Heel walk - Normal\par \par Toe walk - Normal\par \par Reflexes\par Patellar - normal\par Gastroc - normal\par Clonus - No\par \par Hip Exam - Normal\par \par Straight leg raise - none\par \par Pulses - 2+ dp/pt\par \par Range of motion -  reduced\par \par Sensation \par Sensation intact to light touch in L1, L2, L3, L4, L5 and S1 dermatomes bilaterally\par \par Motor\par 	IP	Quad	HS	TA	Gastroc	EHL\par Right	4/5	5/5	5/5	5/5	5/5	4/5\par Left	5/5	5/5	5/5	5/5	5/5	5/5 [de-identified] : Lumbar radiographs\par L4-L5 spondylolisthesis noted\par Facet arthropathy noted\par Disc height loss noted\par \par Lumbar MRI reviewed from April 2021\par Mild central and lateral recess stenosis noted at L4-L5 bilaterally

## 2022-09-26 NOTE — REASON FOR VISIT
[Follow-Up Visit] : a follow-up visit for [Back Pain] : back pain [Radiculopathy] : radiculopathy (4) patient too young to ambulate or walks frequently

## 2022-09-26 NOTE — ASSESSMENT
[FreeTextEntry1] : I had a lengthy discussion with the patient in regards to their treatment plan and diagnosis.  They do have objective weakness findings on my exam.  Their symptoms have persisted despite the conservative management they have attempted thus far.  As a result I would like to proceed with a lumbar MRI.  In tandem with this they should begin physical therapy/home therapy program.  The patient can take Tylenol/NSAIDs as needed for pain control if medically able to.  I will have the patient follow-up in 3 to 4 weeks for repeat clinical evaluation.  I encouraged them to follow-up sooner if their symptoms worsen or change in any way.\par \par The patient has tried the following treatments:\par Activity modification          +\par Ice/Compression                  +\par Braces                                     -\par NSAIDS                                   +\par Physical Therapy                   +\par \par Please note above modalities were tried for over 6+ weeks over the past 2 months\par \par Prior to her MRI she will call the office and we will help her obtain a prescription, one-time dose of Valium to help her take the MRI.

## 2022-09-28 ENCOUNTER — FORM ENCOUNTER (OUTPATIENT)
Age: 57
End: 2022-09-28

## 2022-10-14 ENCOUNTER — APPOINTMENT (OUTPATIENT)
Dept: ULTRASOUND IMAGING | Facility: IMAGING CENTER | Age: 57
End: 2022-10-14

## 2022-10-14 ENCOUNTER — OUTPATIENT (OUTPATIENT)
Dept: OUTPATIENT SERVICES | Facility: HOSPITAL | Age: 57
LOS: 1 days | End: 2022-10-14
Payer: COMMERCIAL

## 2022-10-14 DIAGNOSIS — Z00.8 ENCOUNTER FOR OTHER GENERAL EXAMINATION: ICD-10-CM

## 2022-10-14 DIAGNOSIS — Z00.00 ENCOUNTER FOR GENERAL ADULT MEDICAL EXAMINATION WITHOUT ABNORMAL FINDINGS: ICD-10-CM

## 2022-10-14 PROCEDURE — 76536 US EXAM OF HEAD AND NECK: CPT

## 2022-10-14 PROCEDURE — 76536 US EXAM OF HEAD AND NECK: CPT | Mod: 26

## 2022-10-19 ENCOUNTER — APPOINTMENT (OUTPATIENT)
Dept: ORTHOPEDIC SURGERY | Facility: CLINIC | Age: 57
End: 2022-10-19

## 2022-10-26 RX ORDER — DIAZEPAM 10 MG/1
10 TABLET ORAL
Qty: 1 | Refills: 0 | Status: ACTIVE | COMMUNITY
Start: 2022-10-26 | End: 1900-01-01

## 2022-10-27 ENCOUNTER — APPOINTMENT (OUTPATIENT)
Dept: MRI IMAGING | Facility: IMAGING CENTER | Age: 57
End: 2022-10-27

## 2022-11-30 ENCOUNTER — OUTPATIENT (OUTPATIENT)
Dept: OUTPATIENT SERVICES | Facility: HOSPITAL | Age: 57
LOS: 1 days | End: 2022-11-30
Payer: COMMERCIAL

## 2022-11-30 ENCOUNTER — APPOINTMENT (OUTPATIENT)
Dept: MRI IMAGING | Facility: IMAGING CENTER | Age: 57
End: 2022-11-30

## 2022-11-30 DIAGNOSIS — M54.9 DORSALGIA, UNSPECIFIED: ICD-10-CM

## 2022-11-30 PROCEDURE — 72148 MRI LUMBAR SPINE W/O DYE: CPT | Mod: 26

## 2022-11-30 PROCEDURE — 72148 MRI LUMBAR SPINE W/O DYE: CPT

## 2023-01-30 ENCOUNTER — APPOINTMENT (OUTPATIENT)
Dept: ORTHOPEDIC SURGERY | Facility: CLINIC | Age: 58
End: 2023-01-30

## 2023-01-31 ENCOUNTER — APPOINTMENT (OUTPATIENT)
Dept: INTERNAL MEDICINE | Facility: CLINIC | Age: 58
End: 2023-01-31
Payer: COMMERCIAL

## 2023-01-31 VITALS
BODY MASS INDEX: 34.72 KG/M2 | WEIGHT: 216 LBS | HEART RATE: 73 BPM | DIASTOLIC BLOOD PRESSURE: 62 MMHG | HEIGHT: 66 IN | OXYGEN SATURATION: 99 % | SYSTOLIC BLOOD PRESSURE: 110 MMHG

## 2023-01-31 VITALS — SYSTOLIC BLOOD PRESSURE: 123 MMHG | DIASTOLIC BLOOD PRESSURE: 89 MMHG

## 2023-01-31 PROCEDURE — 99213 OFFICE O/P EST LOW 20 MIN: CPT

## 2023-01-31 NOTE — ASSESSMENT
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  Reports she ran out of amlodipine about 2 weeks ago. Otherwise working on diet and lifestyle modifications.  BP today in target range however higher off meds in the past. Restart amlodipine 2.5 and use daily.  Recheck in 3 months - be sure to take meds day of visit. \par RE lipoma back of her neck: she wants to get it removed - referring to Dr Cardenas for assessment and resection.\par RE keloid on left breast: Was injected once by Dr Cardenas in the past which she reports helped a lot with discomfort. She would like it reassessed. \par \par declines flu shot

## 2023-01-31 NOTE — HISTORY OF PRESENT ILLNESS
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 \par RE LBP: w/u and tx in progress, Dr Conley\par \par RE HTN: here to reassess if BP under sufficiently good control on amlodipine 2.5.  Reports she ran out of amlodipine about 2 weeks ago. Otherwise working on diet and lifestyle modifications\par RE lipoma back of her neck: she wants to get it removed - requesting referral\par Also has keloid on left breast. Was injected once by Dr Cardenas which she reports helped a lot with discomfort. She would like it reassessed. \par \par Prior 9/2/22\par RE HTN: Recently started on amlodipine 2.5 mg QD for elevated BP in office for about a year.  Home BP's have been mostly normal so was sent for ECHO to help guide care.  ECHO show concentric LV remodeling, effect of longstanding HTN.   \par Reports she came back from [place] with a viral infection. COVID test was negative. \par Lasted a few days but feeling better now.  \par Reports she has a h/x of right lower back pain - has seen ortho, Dr Conley in the past. Was sent for PT which really helped her a great deal - especially the water tx.  She has recently had a flare of the pain. starts in the right buttock and wraps around.   No weakness, no numbness or sensory changes.  Pain tends to occur with certain movements. \par \par prior (7/15/22)\par RE breast lesion: breast much better since seeing Dr Cardenas and getting injection - is also taking evening primrose oil . cont current care\par RE HTN: although keeps BP log at home  (kept daily for over a month) and readings are in target range, has not yet brought BP machine in to check for accuracy.  Discussed options at this point - BP quite elevated today and suspect this may not just be white coat HTN. Agreed to begin low dose BP medication today - also referring to cardiology for eval and ECHO - r/o LVH\par Begin Amlodipine 2.5 mg QD - keep BP log and f/u\par Brief postural lightheadedness: Reports she has noticed when she squats down for awhile and then gets up, feels lightheaded for about 20 sec and then it passes. Also occurs when she bends over and then straightens up. No associated palpitations, SOB, syncope or near syncope. Not a sensation of spinning. \par seems most c/w very mild autonomic dysfunction but can also be address at cardiology. \par RE occasional constipation: increase fluids, whole grains, fruits and vegetables. can add miralax for constipation as needed.\par Referring to GI as due for repeat colonoscopy. \par

## 2023-01-31 NOTE — PHYSICAL EXAM
[No Acute Distress] : no acute distress [Well-Appearing] : well-appearing [EOMI] : extraocular movements intact [No Lymphadenopathy] : no lymphadenopathy [No Respiratory Distress] : no respiratory distress  [Clear to Auscultation] : lungs were clear to auscultation bilaterally [Normal Rate] : normal rate  [Regular Rhythm] : with a regular rhythm [Normal S1, S2] : normal S1 and S2 [No Edema] : there was no peripheral edema [Soft] : abdomen soft [Non Tender] : non-tender [No CVA Tenderness] : no CVA  tenderness [Grossly Normal Strength/Tone] : grossly normal strength/tone [No Rash] : no rash [de-identified] : superficial skin lesion left lateral breast suggestive of keloid

## 2023-02-16 ENCOUNTER — APPOINTMENT (OUTPATIENT)
Dept: OBGYN | Facility: CLINIC | Age: 58
End: 2023-02-16
Payer: COMMERCIAL

## 2023-02-16 VITALS
BODY MASS INDEX: 34.07 KG/M2 | WEIGHT: 212 LBS | DIASTOLIC BLOOD PRESSURE: 90 MMHG | HEIGHT: 66 IN | SYSTOLIC BLOOD PRESSURE: 140 MMHG

## 2023-02-16 PROCEDURE — 99396 PREV VISIT EST AGE 40-64: CPT

## 2023-02-16 RX ORDER — FLUCONAZOLE 150 MG/1
150 TABLET ORAL
Qty: 2 | Refills: 1 | Status: ACTIVE | COMMUNITY
Start: 2023-02-16 | End: 1900-01-01

## 2023-02-16 NOTE — PLAN
[FreeTextEntry1] : well woman\par \par nml vaginal pH\par r/o yeast \par \par dryness\par trial of vaginal estrogen\par \par left breast pain after keloid on lateral breast \par s/p diagnostic breast imaging\par pt has referral and encouraged to see breast specialist

## 2023-02-16 NOTE — HISTORY OF PRESENT ILLNESS
[FreeTextEntry1] : 58yo P2 PM woman woman here for annual exam \par no gyn complaints\par low libido\par BV resolved\par \par noted chronic vaginal dryness

## 2023-02-19 LAB — HPV HIGH+LOW RISK DNA PNL CVX: NOT DETECTED

## 2023-02-22 DIAGNOSIS — N95.2 POSTMENOPAUSAL ATROPHIC VAGINITIS: ICD-10-CM

## 2023-02-22 LAB — CYTOLOGY CVX/VAG DOC THIN PREP: NORMAL

## 2023-04-09 ENCOUNTER — NON-APPOINTMENT (OUTPATIENT)
Age: 58
End: 2023-04-09

## 2023-05-03 ENCOUNTER — APPOINTMENT (OUTPATIENT)
Dept: SURGICAL ONCOLOGY | Facility: CLINIC | Age: 58
End: 2023-05-03
Payer: COMMERCIAL

## 2023-05-03 VITALS
RESPIRATION RATE: 16 BRPM | HEART RATE: 60 BPM | DIASTOLIC BLOOD PRESSURE: 95 MMHG | OXYGEN SATURATION: 98 % | WEIGHT: 212 LBS | SYSTOLIC BLOOD PRESSURE: 158 MMHG | BODY MASS INDEX: 34.07 KG/M2 | HEIGHT: 66 IN

## 2023-05-03 DIAGNOSIS — M94.0 CHONDROCOSTAL JUNCTION SYNDROME [TIETZE]: ICD-10-CM

## 2023-05-03 DIAGNOSIS — N64.4 MASTODYNIA: ICD-10-CM

## 2023-05-03 PROCEDURE — 11900 INJECT SKIN LESIONS </W 7: CPT

## 2023-05-03 PROCEDURE — 99072 ADDL SUPL MATRL&STAF TM PHE: CPT

## 2023-05-03 RX ORDER — IBUPROFEN 600 MG/1
600 TABLET, FILM COATED ORAL
Qty: 60 | Refills: 3 | Status: ACTIVE | COMMUNITY
Start: 2023-05-03 | End: 1900-01-01

## 2023-05-03 RX ORDER — FAMOTIDINE 20 MG/1
20 TABLET, FILM COATED ORAL DAILY
Qty: 30 | Refills: 3 | Status: ACTIVE | COMMUNITY
Start: 2023-05-03 | End: 1900-01-01

## 2023-05-03 NOTE — PROCEDURE
[FreeTextEntry1] : 1 cc of Kenalog 10 injected into left breast keloid under sterile conditions\par Sterile dressing applied

## 2023-05-03 NOTE — HISTORY OF PRESENT ILLNESS
[de-identified] : Patient is a 57 y/o female who presents a follow up. She is status post left breast excision by her dermatologist Dr. Tio Garcia on 7/22/21. Pathology showed hypertrophic scar. \par \par US head/neck (10/14/22): Mass with echogenic striations measuring 10.7 x 2.3 by approximately 7.4 cm without internal vascularity most consistent with a lipoma.\par \par Bilateral mammogram and left breast ultrasound 8/2022- BI-RADS 2.\par \par Bilateral mammogram performed on 6/18/21 showed no mammographic evidence of malignancy. (BI-RADS 2)\par \par Patient has a history of right breast biopsy in May 2016 reportedly benign and a left excisional biopsy at the age of 15- benign.\par Denies any family history of breast cancer. \par \par Denies palpable breast masses, nipple discharge, nipple retraction/inversion, or skin changes.

## 2023-05-03 NOTE — PHYSICAL EXAM
[Normal] : supple, no neck mass and thyroid not enlarged [Normal Neck Lymph Nodes] : normal neck lymph nodes  [Normal Supraclavicular Lymph Nodes] : normal supraclavicular lymph nodes [Normal Groin Lymph Nodes] : normal groin lymph nodes [Normal Axillary Lymph Nodes] : normal axillary lymph nodes [Normal] : oriented to person, place and time, with appropriate affect [de-identified] : left breast keloid slightly decreased.  no masses or adenopathy bilaterally.  Tenderness over left costochondral junction.

## 2023-05-03 NOTE — ASSESSMENT
[FreeTextEntry1] : Left breast pain likely secondary to keloid formation and costochondritis\par S/p Keloid injected into left breast scar today\par Recommend 600mg of Motrin po q6 hours x 48 hours then q6 hours prn\par Continue yearly breast imaging August 2023\par Will get CT scan to evaluate upper back mass \par RTO 6 weeks \par

## 2023-05-03 NOTE — ADDENDUM
[FreeTextEntry1] : I, Jaqui Mead, acted solely as a scribe for Dr. Karlos Cardenas on this date 05/03/2023.\par

## 2023-05-12 ENCOUNTER — APPOINTMENT (OUTPATIENT)
Dept: GASTROENTEROLOGY | Facility: CLINIC | Age: 58
End: 2023-05-12

## 2023-05-15 ENCOUNTER — OUTPATIENT (OUTPATIENT)
Dept: OUTPATIENT SERVICES | Facility: HOSPITAL | Age: 58
LOS: 1 days | End: 2023-05-15
Payer: COMMERCIAL

## 2023-05-15 ENCOUNTER — APPOINTMENT (OUTPATIENT)
Dept: CT IMAGING | Facility: IMAGING CENTER | Age: 58
End: 2023-05-15
Payer: COMMERCIAL

## 2023-05-15 DIAGNOSIS — M94.0 CHONDROCOSTAL JUNCTION SYNDROME [TIETZE]: ICD-10-CM

## 2023-05-15 DIAGNOSIS — Z00.8 ENCOUNTER FOR OTHER GENERAL EXAMINATION: ICD-10-CM

## 2023-05-15 PROCEDURE — 71260 CT THORAX DX C+: CPT

## 2023-05-15 PROCEDURE — 71260 CT THORAX DX C+: CPT | Mod: 26

## 2023-05-31 NOTE — PHYSICAL EXAM
[Normal] : supple, no neck mass and thyroid not enlarged [Normal Neck Lymph Nodes] : normal neck lymph nodes  [Normal Supraclavicular Lymph Nodes] : normal supraclavicular lymph nodes [Normal Groin Lymph Nodes] : normal groin lymph nodes [Normal Axillary Lymph Nodes] : normal axillary lymph nodes [Normal] : oriented to person, place and time, with appropriate affect [de-identified] : left breast keloid slightly decreased.  no masses or adenopathy bilaterally.  Tenderness over left costochondral junction.

## 2023-05-31 NOTE — HISTORY OF PRESENT ILLNESS
[de-identified] : Patient is a 57 y/o female who presents a follow up. She is status post left breast excision by her dermatologist Dr. Tio Garcia on 7/22/21. Pathology showed hypertrophic scar. \par \par CT 5/15/23- No CT evidence of mass in the upper back/neck.\par \par 5/3/23- Left breast pain likely secondary to keloid formation and costochondritis, S/p Keloid injected into left breast scar today\par Recommend 600mg of Motrin po q6 hours x 48 hours then q6 hours prn.\par \par US head/neck (10/14/22): Mass with echogenic striations measuring 10.7 x 2.3 by approximately 7.4 cm without internal vascularity most consistent with a lipoma.\par \par Bilateral mammogram and left breast ultrasound 8/2022- BI-RADS 2.\par \par Bilateral mammogram performed on 6/18/21 showed no mammographic evidence of malignancy. (BI-RADS 2)\par \par Patient has a history of right breast biopsy in May 2016 reportedly benign and a left excisional biopsy at the age of 15- benign.\par Denies any family history of breast cancer. \par \par Denies palpable breast masses, nipple discharge, nipple retraction/inversion, or skin changes.

## 2023-06-07 ENCOUNTER — APPOINTMENT (OUTPATIENT)
Dept: SURGICAL ONCOLOGY | Facility: CLINIC | Age: 58
End: 2023-06-07
Payer: COMMERCIAL

## 2023-06-28 ENCOUNTER — APPOINTMENT (OUTPATIENT)
Dept: SURGICAL ONCOLOGY | Facility: CLINIC | Age: 58
End: 2023-06-28
Payer: COMMERCIAL

## 2023-06-28 NOTE — ASSESSMENT
[FreeTextEntry1] : Left breast pain likely secondary to keloid formation and costochondritis\par S/p Keloid injected into left breast scar 5/3/23\par Recommend 600mg of Motrin po q6 hours prn\par Continue yearly breast imaging August 2023 (ordered)\par RTO .....\par

## 2023-06-28 NOTE — ADDENDUM
[FreeTextEntry1] : I, Jaqui Mead, acted solely as a scribe for Dr. Karlos Cardenas on this date 06/28/2023.

## 2023-06-28 NOTE — PHYSICAL EXAM
[Normal] : supple, no neck mass and thyroid not enlarged [Normal Neck Lymph Nodes] : normal neck lymph nodes  [Normal Supraclavicular Lymph Nodes] : normal supraclavicular lymph nodes [Normal Groin Lymph Nodes] : normal groin lymph nodes [Normal Axillary Lymph Nodes] : normal axillary lymph nodes [Normal] : oriented to person, place and time, with appropriate affect [de-identified] : left breast keloid slightly decreased.  no masses or adenopathy bilaterally.  Tenderness over left costochondral junction.

## 2023-06-28 NOTE — HISTORY OF PRESENT ILLNESS
[de-identified] : Patient is a 59 y/o female who presents a follow up. She is status post left breast excision by her dermatologist Dr. Tio Garcia on 7/22/21. Pathology showed hypertrophic scar. \par \par CT 5/15/23- No CT evidence of mass in the upper back/neck.\par \par 5/3/23- Left breast pain likely secondary to keloid formation and costochondritis, S/p Keloid injected into left breast scar today\par Recommend 600mg of Motrin po q6 hours x 48 hours then q6 hours prn.\par \par US head/neck (10/14/22): Mass with echogenic striations measuring 10.7 x 2.3 by approximately 7.4 cm without internal vascularity most consistent with a lipoma.\par \par Bilateral mammogram and left breast ultrasound 8/2022- BI-RADS 2.\par \par Bilateral mammogram performed on 6/18/21 showed no mammographic evidence of malignancy. (BI-RADS 2)\par \par Patient has a history of right breast biopsy in May 2016 reportedly benign and a left excisional biopsy at the age of 15- benign.\par Denies any family history of breast cancer. \par \par Denies palpable breast masses, nipple discharge, nipple retraction/inversion, or skin changes.

## 2023-07-21 ENCOUNTER — APPOINTMENT (OUTPATIENT)
Dept: GASTROENTEROLOGY | Facility: CLINIC | Age: 58
End: 2023-07-21
Payer: COMMERCIAL

## 2023-07-21 VITALS
SYSTOLIC BLOOD PRESSURE: 133 MMHG | WEIGHT: 200 LBS | TEMPERATURE: 96.8 F | HEART RATE: 75 BPM | BODY MASS INDEX: 32.14 KG/M2 | OXYGEN SATURATION: 98 % | DIASTOLIC BLOOD PRESSURE: 82 MMHG | HEIGHT: 66 IN

## 2023-07-21 DIAGNOSIS — K59.00 CONSTIPATION, UNSPECIFIED: ICD-10-CM

## 2023-07-21 DIAGNOSIS — Z12.11 ENCOUNTER FOR SCREENING FOR MALIGNANT NEOPLASM OF COLON: ICD-10-CM

## 2023-07-21 PROCEDURE — 99203 OFFICE O/P NEW LOW 30 MIN: CPT

## 2023-07-21 RX ORDER — POLYETHYLENE GLYCOL 3350 AND ELECTROLYTES WITH LEMON FLAVOR 236; 22.74; 6.74; 5.86; 2.97 G/4L; G/4L; G/4L; G/4L; G/4L
236 POWDER, FOR SOLUTION ORAL
Qty: 1 | Refills: 0 | Status: ACTIVE | COMMUNITY
Start: 2023-07-21 | End: 1900-01-01

## 2023-07-21 NOTE — HISTORY OF PRESENT ILLNESS
[FreeTextEntry1] : Pt. is a 59y/o F with PMH of HTN, here for evaluation of constipation. She had a colonoscopy before about 10 years ago and she thinks it was normal. She recently has been constipated since  she was very young. She denies smoking or drinking etoh. She denies drug use. She used to go 3 times a week when she was younger, she goes now once a week. She drinks coffee everyday. She drinks tea every day.\par \par She drinks homemade drinks and pepsi. Typically, she wakes up in the morning, she eats oatmeal with water and fruits. In the evening, she has a potato and a salad(lettuce and cheese and nuts). She has rice once a week. She eats a baked potato for dinner. She eats mostly berries, she is allergic to avocado, cantaloupe, cherries and water melon  and honey dew. \par \par She is now doing intermittent fasting. She thinks she drinks 1.5L water/day. 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

## 2023-07-21 NOTE — ASSESSMENT
[FreeTextEntry1] : Pt. is a 59y/o F with PMH of HTN, constipation who doesn’t seem to eat a healthy diet. She will try miralax BID and psyllium husk daily. She will also need a colonoscopy to keep with colon cancer screening. She will increase her water intake to 2L daily and she will try to eat 5-10 fruits and veggies/day. She was advised to eat more fiber. We discussed sources of fiber rich foods. She was advised to not drink coffee or tea.\par \par She will plan for a colonoscopy at [facility], we discussed risks and benefits of a colonoscopy including perforation and bleeding. We discussed need for a ride on day of procedure.\par \par Chau Mandel MD\par [facility]

## 2023-07-21 NOTE — PHYSICAL EXAM
[Obese (BMI >= 30)] : obese (BMI >= 30) [Normal Color / Pigmentation] : normal skin color and pigmentation [Normal] : oriented to person, place, and time [Oriented To Time, Place, And Person] : oriented to person, place, and time

## 2023-07-28 ENCOUNTER — APPOINTMENT (OUTPATIENT)
Dept: SURGICAL ONCOLOGY | Facility: CLINIC | Age: 58
End: 2023-07-28
Payer: COMMERCIAL

## 2023-07-31 PROBLEM — R00.1 BRADYCARDIA: Status: ACTIVE | Noted: 2021-05-16

## 2023-08-01 ENCOUNTER — APPOINTMENT (OUTPATIENT)
Age: 58
End: 2023-08-01

## 2023-08-01 ENCOUNTER — OUTPATIENT (OUTPATIENT)
Dept: OUTPATIENT SERVICES | Facility: HOSPITAL | Age: 58
LOS: 1 days | End: 2023-08-01
Payer: COMMERCIAL

## 2023-08-01 ENCOUNTER — APPOINTMENT (OUTPATIENT)
Dept: INTERNAL MEDICINE | Facility: CLINIC | Age: 58
End: 2023-08-01
Payer: COMMERCIAL

## 2023-08-01 VITALS
DIASTOLIC BLOOD PRESSURE: 80 MMHG | WEIGHT: 220 LBS | OXYGEN SATURATION: 98 % | SYSTOLIC BLOOD PRESSURE: 150 MMHG | HEART RATE: 62 BPM | BODY MASS INDEX: 35.36 KG/M2 | HEIGHT: 66 IN

## 2023-08-01 VITALS — DIASTOLIC BLOOD PRESSURE: 84 MMHG | SYSTOLIC BLOOD PRESSURE: 144 MMHG

## 2023-08-01 DIAGNOSIS — E66.9 OBESITY, UNSPECIFIED: ICD-10-CM

## 2023-08-01 DIAGNOSIS — R00.1 BRADYCARDIA, UNSPECIFIED: ICD-10-CM

## 2023-08-01 DIAGNOSIS — I10 ESSENTIAL (PRIMARY) HYPERTENSION: ICD-10-CM

## 2023-08-01 DIAGNOSIS — R22.32 LOCALIZED SWELLING, MASS AND LUMP, LEFT UPPER LIMB: ICD-10-CM

## 2023-08-01 PROCEDURE — 99214 OFFICE O/P EST MOD 30 MIN: CPT

## 2023-08-01 PROCEDURE — G0463: CPT

## 2023-08-05 NOTE — ASSESSMENT
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  RE breast pain: Followed by Dr Cardenas - breast pain thought to be related to keloid. to have mammo in Aug.  RE wt: has been doing intermittent fast from 6 pm to 12 PM.   has been walking 3-4 miles few times a week  Wt here 220 although she says was significantly less on her scale.  Has not resulted in wt loss.  Discussed r v b of GLP1 - reviewed hx  with Ms DELVALLE to ensure no contraindications - none  She will work harder on wt loss - readdress next visit. She feels if she cannot achieve any sig wt loss, would like to try GLP1.  Soft tissue nodule:  left pinky, ventral surface -suspect ganglion cyst.  refer to hand specialist.

## 2023-08-05 NOTE — ADDENDUM
[FreeTextEntry1] :  38 minutes spent in preparation of this visit, including review of previous notes and test results, interview and examination of patient, discussion of plan, arranging for appropriate testing and treatment, and documentation.

## 2023-08-05 NOTE — HISTORY OF PRESENT ILLNESS
[FreeTextEntry1] : nodule on finger struggling with wt loss [de-identified] : 59 yo F with h/o HTN, HLD, LBP, posterior neck mass with US c/w lipoma, s/p resection of breast lesion c/w dermatofibroma Chart reviewed today in preparation for visit. Since last visit:  Seen by GI for constipation, taking miralax - colonoscopy to be scheduled.  Followed by Dr Cardenas - breast pain thought to be related to keloid. to have mammo in Aug.  due for labs Reports she has occ ankle swelling with the heat - but not currently present today.  RE wt: has been doing intermittent fast from 6 pm to 12 PM  has been walking 3-4 miles few times a week  Wt here 220 although she says was significantly less on her scale.  In addition, small nodule -  left pinky, ventral surface    prior 1/23 RE LBP: w/u and tx in progress, Dr Conley RE HTN: here to reassess if BP under sufficiently good control on amlodipine 2.5.  Reports she ran out of amlodipine about 2 weeks ago. Otherwise working on diet and lifestyle modifications RE lipoma back of her neck: she wants to get it removed - requesting referral Also has keloid on left breast. Was injected once by Dr Cardenas which she reports helped a lot with discomfort. She would like it reassessed.   Prior 9/2/22 RE HTN: Recently started on amlodipine 2.5 mg QD for elevated BP in office for about a year.  Home BP's have been mostly normal so was sent for ECHO to help guide care.  ECHO show concentric LV remodeling, effect of longstanding HTN.    Reports she came back from [place] with a viral infection. COVID test was negative.  Lasted a few days but feeling better now.   Reports she has a h/x of right lower back pain - has seen ortho, Dr Conley in the past. Was sent for PT which really helped her a great deal - especially the water tx.  She has recently had a flare of the pain. starts in the right buttock and wraps around.   No weakness, no numbness or sensory changes.  Pain tends to occur with certain movements.   prior (7/15/22) RE breast lesion: breast much better since seeing Dr Cardenas and getting injection - is also taking evening primrose oil . cont current care RE HTN: although keeps BP log at home  (kept daily for over a month) and readings are in target range, has not yet brought BP machine in to check for accuracy.  Discussed options at this point - BP quite elevated today and suspect this may not just be white coat HTN. Agreed to begin low dose BP medication today - also referring to cardiology for eval and ECHO - r/o LVH Begin Amlodipine 2.5 mg QD - keep BP log and f/u Brief postural lightheadedness: Reports she has noticed when she squats down for awhile and then gets up, feels lightheaded for about 20 sec and then it passes. Also occurs when she bends over and then straightens up. No associated palpitations, SOB, syncope or near syncope. Not a sensation of spinning.  seems most c/w very mild autonomic dysfunction but can also be address at cardiology.  RE occasional constipation: increase fluids, whole grains, fruits and vegetables. can add miralax for constipation as needed. Referring to GI as due for repeat colonoscopy.

## 2023-08-05 NOTE — COUNSELING
[Potential consequences of obesity discussed] : Potential consequences of obesity discussed [Benefits of weight loss discussed] : Benefits of weight loss discussed [Structured Weight Management Program suggested:] : Structured weight management program suggested [Encouraged to increase physical activity] : Encouraged to increase physical activity [Target Wt Loss Goal ___] : Weight Loss Goals: Target weight loss goal [unfilled] lbs [Decrease Portions] : decrease portions [FreeTextEntry3] : walking at least 2 miles 5 times a week

## 2023-08-05 NOTE — PHYSICAL EXAM
[No Acute Distress] : no acute distress [Well-Appearing] : well-appearing [EOMI] : extraocular movements intact [No Lymphadenopathy] : no lymphadenopathy [No Respiratory Distress] : no respiratory distress  [Clear to Auscultation] : lungs were clear to auscultation bilaterally [Normal Rate] : normal rate  [Regular Rhythm] : with a regular rhythm [Normal S1, S2] : normal S1 and S2 [No Edema] : there was no peripheral edema [Soft] : abdomen soft [Non Tender] : non-tender [No CVA Tenderness] : no CVA  tenderness [No Spinal Tenderness] : no spinal tenderness [Grossly Normal Strength/Tone] : grossly normal strength/tone [No Rash] : no rash [No Focal Deficits] : no focal deficits [de-identified] : superficial skin lesion left lateral breast suggestive of keloid [de-identified] : firm ~ 0.5 cm soft tissue nodule 5th digit, left hand

## 2023-08-15 ENCOUNTER — LABORATORY RESULT (OUTPATIENT)
Age: 58
End: 2023-08-15

## 2023-08-26 ENCOUNTER — APPOINTMENT (OUTPATIENT)
Dept: ULTRASOUND IMAGING | Facility: IMAGING CENTER | Age: 58
End: 2023-08-26
Payer: COMMERCIAL

## 2023-08-26 ENCOUNTER — RESULT REVIEW (OUTPATIENT)
Age: 58
End: 2023-08-26

## 2023-08-26 ENCOUNTER — OUTPATIENT (OUTPATIENT)
Dept: OUTPATIENT SERVICES | Facility: HOSPITAL | Age: 58
LOS: 1 days | End: 2023-08-26
Payer: COMMERCIAL

## 2023-08-26 ENCOUNTER — APPOINTMENT (OUTPATIENT)
Dept: MAMMOGRAPHY | Facility: IMAGING CENTER | Age: 58
End: 2023-08-26
Payer: COMMERCIAL

## 2023-08-26 DIAGNOSIS — Z00.8 ENCOUNTER FOR OTHER GENERAL EXAMINATION: ICD-10-CM

## 2023-08-26 PROCEDURE — 77063 BREAST TOMOSYNTHESIS BI: CPT

## 2023-08-26 PROCEDURE — 77067 SCR MAMMO BI INCL CAD: CPT

## 2023-08-26 PROCEDURE — 76641 ULTRASOUND BREAST COMPLETE: CPT

## 2023-08-26 PROCEDURE — 77063 BREAST TOMOSYNTHESIS BI: CPT | Mod: 26

## 2023-08-26 PROCEDURE — 76641 ULTRASOUND BREAST COMPLETE: CPT | Mod: 26,50

## 2023-08-26 PROCEDURE — 77067 SCR MAMMO BI INCL CAD: CPT | Mod: 26

## 2023-09-06 ENCOUNTER — APPOINTMENT (OUTPATIENT)
Dept: SURGICAL ONCOLOGY | Facility: CLINIC | Age: 58
End: 2023-09-06
Payer: COMMERCIAL

## 2023-09-06 VITALS
HEART RATE: 57 BPM | HEIGHT: 66 IN | SYSTOLIC BLOOD PRESSURE: 137 MMHG | RESPIRATION RATE: 17 BRPM | OXYGEN SATURATION: 99 % | DIASTOLIC BLOOD PRESSURE: 88 MMHG

## 2023-09-06 DIAGNOSIS — D17.1 BENIGN LIPOMATOUS NEOPLASM OF SKIN AND SUBCUTANEOUS TISSUE OF TRUNK: ICD-10-CM

## 2023-09-06 DIAGNOSIS — D23.5 OTHER BENIGN NEOPLASM OF SKIN OF TRUNK: ICD-10-CM

## 2023-09-06 PROCEDURE — 99214 OFFICE O/P EST MOD 30 MIN: CPT

## 2023-09-06 NOTE — ASSESSMENT
[FreeTextEntry1] : Posterior neck soft tissue mass most c/w lipoma vs kyphotic deformity Will get MRI of cervical spine now  Continue yearly breast imaging August 2024  RTO after imaging

## 2023-09-06 NOTE — ADDENDUM
[FreeTextEntry1] : I, Jaqui Mead, acted solely as a scribe for Dr. Karlos Cardenas on this date 09/06/2023.

## 2023-09-06 NOTE — HISTORY OF PRESENT ILLNESS
[de-identified] : Patient is a 57 y/o female who presents a follow up. She is status post left breast excision by her dermatologist Dr. Tio Garcia on 7/22/21. Pathology showed hypertrophic scar.   Denies palpable breast mass or nipple discharge.  Posterior neck mass (described as lipoma on ultrasound, stable from 1/2017-10/2022) has not changed but she does experience some aching in her neck in certain positions.  I ordered an MRI evaluation of the mass in the past but she was unable to tolerate the exam.  Keloid in the left breast/chest wall with signficant improvement in pain after steroid injection.   Bilateral mammo/sono (8/23/23): No mammographic or sonographic evidence of malignancy. (BI-RADS 2)  CT 5/15/23- No CT evidence of mass in the upper back/neck.  5/3/23- Left breast pain likely secondary to keloid formation and costochondritis, S/p Keloid injected into left breast scar today Recommend 600mg of Motrin po q6 hours x 48 hours then q6 hours prn.  US head/neck (10/14/22): Mass with echogenic striations measuring 10.7 x 2.3 by approximately 7.4 cm without internal vascularity most consistent with a lipoma.  Bilateral mammogram and left breast ultrasound 8/2022- BI-RADS 2.  Bilateral mammogram performed on 6/18/21 showed no mammographic evidence of malignancy. (BI-RADS 2)  Patient has a history of right breast biopsy in May 2016 reportedly benign and a left excisional biopsy at the age of 15- benign. Denies any family history of breast cancer.   Denies palpable breast masses, nipple discharge, nipple retraction/inversion, or skin changes.

## 2023-09-08 ENCOUNTER — APPOINTMENT (OUTPATIENT)
Dept: OPHTHALMOLOGY | Facility: CLINIC | Age: 58
End: 2023-09-08

## 2023-09-12 ENCOUNTER — NON-APPOINTMENT (OUTPATIENT)
Age: 58
End: 2023-09-12

## 2023-09-12 ENCOUNTER — APPOINTMENT (OUTPATIENT)
Dept: OPHTHALMOLOGY | Facility: CLINIC | Age: 58
End: 2023-09-12
Payer: COMMERCIAL

## 2023-09-12 PROCEDURE — 92014 COMPRE OPH EXAM EST PT 1/>: CPT

## 2023-09-12 PROCEDURE — 92310 CONTACT LENS FITTING OU: CPT

## 2023-09-27 ENCOUNTER — NON-APPOINTMENT (OUTPATIENT)
Age: 58
End: 2023-09-27

## 2023-10-01 ENCOUNTER — APPOINTMENT (OUTPATIENT)
Dept: GASTROENTEROLOGY | Facility: AMBULATORY MEDICAL SERVICES | Age: 58
End: 2023-10-01
Payer: COMMERCIAL

## 2023-10-01 PROCEDURE — 45380 COLONOSCOPY AND BIOPSY: CPT | Mod: 33,59

## 2023-10-01 PROCEDURE — 45385 COLONOSCOPY W/LESION REMOVAL: CPT | Mod: 33

## 2023-10-10 ENCOUNTER — APPOINTMENT (OUTPATIENT)
Dept: ORTHOPEDIC SURGERY | Facility: CLINIC | Age: 58
End: 2023-10-10
Payer: COMMERCIAL

## 2023-10-10 VITALS
SYSTOLIC BLOOD PRESSURE: 156 MMHG | WEIGHT: 220 LBS | HEIGHT: 66 IN | DIASTOLIC BLOOD PRESSURE: 95 MMHG | BODY MASS INDEX: 35.36 KG/M2

## 2023-10-10 PROCEDURE — 20612 ASPIRATE/INJ GANGLION CYST: CPT | Mod: F4

## 2023-10-10 PROCEDURE — 99204 OFFICE O/P NEW MOD 45 MIN: CPT | Mod: 25

## 2023-12-10 ENCOUNTER — APPOINTMENT (OUTPATIENT)
Dept: MRI IMAGING | Facility: IMAGING CENTER | Age: 58
End: 2023-12-10
Payer: COMMERCIAL

## 2023-12-10 ENCOUNTER — OUTPATIENT (OUTPATIENT)
Dept: OUTPATIENT SERVICES | Facility: HOSPITAL | Age: 58
LOS: 1 days | End: 2023-12-10
Payer: COMMERCIAL

## 2023-12-10 DIAGNOSIS — D17.1 BENIGN LIPOMATOUS NEOPLASM OF SKIN AND SUBCUTANEOUS TISSUE OF TRUNK: ICD-10-CM

## 2023-12-10 PROCEDURE — 72156 MRI NECK SPINE W/O & W/DYE: CPT | Mod: 26

## 2023-12-10 PROCEDURE — A9585: CPT

## 2023-12-10 PROCEDURE — 72156 MRI NECK SPINE W/O & W/DYE: CPT

## 2023-12-20 ENCOUNTER — APPOINTMENT (OUTPATIENT)
Dept: SURGICAL ONCOLOGY | Facility: CLINIC | Age: 58
End: 2023-12-20
Payer: COMMERCIAL

## 2023-12-20 VITALS
BODY MASS INDEX: 35.36 KG/M2 | HEIGHT: 66 IN | DIASTOLIC BLOOD PRESSURE: 90 MMHG | SYSTOLIC BLOOD PRESSURE: 148 MMHG | HEART RATE: 63 BPM | WEIGHT: 220 LBS | OXYGEN SATURATION: 98 %

## 2023-12-20 DIAGNOSIS — R60.9 EDEMA, UNSPECIFIED: ICD-10-CM

## 2023-12-20 PROCEDURE — 99214 OFFICE O/P EST MOD 30 MIN: CPT

## 2023-12-28 PROBLEM — R60.9 SOFT TISSUE SWELLING OF BACK: Status: ACTIVE | Noted: 2022-04-09

## 2023-12-28 NOTE — HISTORY OF PRESENT ILLNESS
[de-identified] : Patient is a 57 y/o female who presents a follow up. She is status post left breast excision by her dermatologist Dr. Tio Garcia on 7/22/21. Pathology showed hypertrophic scar.   Keloid in the left breast/chest wall with significant improvement in pain after steroid injection. Today she presents with a lesion in the mid chest wall due to after a piercing fell out from the area in 8/2023.   MRI cervical spine (12/10/23): No spinal canal or neural foraminal stenosis in the cervical spine.  Posterior neck mass (described as lipoma on ultrasound, stable from 1/2017-10/2022) has not changed but she does experience some aching in her neck in certain positions.    Bilateral mammo/sono (8/23/23): No mammographic or sonographic evidence of malignancy. (BI-RADS 2)  CT 5/15/23- No CT evidence of mass in the upper back/neck.  5/3/23- Left breast pain likely secondary to keloid formation and costochondritis, S/p Keloid injected into left breast scar today Recommend 600mg of Motrin po q6 hours x 48 hours then q6 hours prn.  US head/neck (10/14/22): Mass with echogenic striations measuring 10.7 x 2.3 by approximately 7.4 cm without internal vascularity most consistent with a lipoma.  Bilateral mammogram and left breast ultrasound 8/2022- BI-RADS 2.  Bilateral mammogram performed on 6/18/21 showed no mammographic evidence of malignancy. (BI-RADS 2)  Patient has a history of right breast biopsy in May 2016 reportedly benign and a left excisional biopsy at the age of 15- benign. Denies any family history of breast cancer.

## 2023-12-28 NOTE — PROCEDURE
[FreeTextEntry1] : 1 cc of Kenalog 10 injected mid chest wall lesion under sterile conditions  Sterile dressing applied Pt tolerated procedure well

## 2023-12-28 NOTE — ADDENDUM
[FreeTextEntry1] : I, Jaqui Mead, acted solely as a scribe for Dr. Karlos Cardenas on this date 12/20/2023.

## 2023-12-28 NOTE — PHYSICAL EXAM
[de-identified] : soft tissue prominence post neck, no distinct mass [de-identified] : left breast keloid slightly decreased.  no masses or adenopathy bilaterally.

## 2023-12-28 NOTE — ASSESSMENT
[FreeTextEntry1] : Negative MRI of cervical neck/spine 12/2023  Will refer to [facility] for PT for the posterior neck PT Steroid inj of mid chest wall lesion performed today  RTO prn

## 2024-02-12 RX ORDER — AMLODIPINE BESYLATE 2.5 MG/1
2.5 TABLET ORAL DAILY
Qty: 90 | Refills: 3 | Status: ACTIVE | COMMUNITY
Start: 2022-07-15 | End: 1900-01-01

## 2024-04-04 ENCOUNTER — APPOINTMENT (OUTPATIENT)
Dept: OBGYN | Facility: CLINIC | Age: 59
End: 2024-04-04
Payer: COMMERCIAL

## 2024-04-04 ENCOUNTER — APPOINTMENT (OUTPATIENT)
Dept: ORTHOPEDIC SURGERY | Facility: CLINIC | Age: 59
End: 2024-04-04

## 2024-04-04 VITALS
SYSTOLIC BLOOD PRESSURE: 119 MMHG | DIASTOLIC BLOOD PRESSURE: 80 MMHG | WEIGHT: 221 LBS | BODY MASS INDEX: 35.52 KG/M2 | HEIGHT: 66 IN

## 2024-04-04 DIAGNOSIS — R10.2 PELVIC AND PERINEAL PAIN: ICD-10-CM

## 2024-04-04 DIAGNOSIS — N90.89 OTHER SPECIFIED NONINFLAMMATORY DISORDERS OF VULVA AND PERINEUM: ICD-10-CM

## 2024-04-04 DIAGNOSIS — Z87.42 PERSONAL HISTORY OF OTHER DISEASES OF THE FEMALE GENITAL TRACT: ICD-10-CM

## 2024-04-04 DIAGNOSIS — Z87.898 PERSONAL HISTORY OF OTHER SPECIFIED CONDITIONS: ICD-10-CM

## 2024-04-04 DIAGNOSIS — Z01.419 ENCOUNTER FOR GYNECOLOGICAL EXAMINATION (GENERAL) (ROUTINE) W/OUT ABNORMAL FINDINGS: ICD-10-CM

## 2024-04-04 LAB
BILIRUB UR QL STRIP: NORMAL
GLUCOSE UR-MCNC: NORMAL
HCG UR QL: 1 EU/DL
HGB UR QL STRIP.AUTO: NORMAL
KETONES UR-MCNC: NORMAL
LEUKOCYTE ESTERASE UR QL STRIP: NORMAL
NITRITE UR QL STRIP: NORMAL
PH UR STRIP: 6.5
PROT UR STRIP-MCNC: NORMAL
SP GR UR STRIP: 1.01

## 2024-04-04 PROCEDURE — 99396 PREV VISIT EST AGE 40-64: CPT

## 2024-04-04 RX ORDER — ALPRAZOLAM 0.25 MG/1
0.25 TABLET ORAL
Qty: 2 | Refills: 0 | Status: COMPLETED | COMMUNITY
Start: 2023-09-06 | End: 2024-04-04

## 2024-04-04 RX ORDER — ESTRADIOL 10 UG/1
10 TABLET, FILM COATED VAGINAL
Qty: 30 | Refills: 3 | Status: ACTIVE | COMMUNITY
Start: 2023-02-22 | End: 1900-01-01

## 2024-04-04 NOTE — PLAN
[FreeTextEntry1] : well woman  mammo august notes pain since last biopsy (benign) negtive imaging since  ucx  stressed allowing time to complete urine stream  f/u pap vaginal estrogen for  atrophic vaginitis

## 2024-04-04 NOTE — HISTORY OF PRESENT ILLNESS
[FreeTextEntry1] : 60yo P2 PM woman here for annual exam  no gyn complaints  noted chronic vaginal dryness improved w vaginal estrogen  may not be fully emptying, though she is ususally rushing to finish if she waits then additional urine released   mammo w Breast /surg/Onc

## 2024-04-05 DIAGNOSIS — B96.89 ACUTE VAGINITIS: ICD-10-CM

## 2024-04-05 DIAGNOSIS — N76.0 ACUTE VAGINITIS: ICD-10-CM

## 2024-04-05 LAB
CANDIDA VAG CYTO: NOT DETECTED
G VAGINALIS+PREV SP MTYP VAG QL MICRO: DETECTED
HPV HIGH+LOW RISK DNA PNL CVX: NOT DETECTED
T VAGINALIS VAG QL WET PREP: NOT DETECTED

## 2024-04-05 RX ORDER — METRONIDAZOLE 500 MG/1
500 TABLET ORAL TWICE DAILY
Qty: 14 | Refills: 1 | Status: ACTIVE | COMMUNITY
Start: 2024-04-05 | End: 1900-01-01

## 2024-04-05 RX ORDER — FLUCONAZOLE 150 MG/1
150 TABLET ORAL
Qty: 1 | Refills: 1 | Status: ACTIVE | COMMUNITY
Start: 2024-04-05 | End: 1900-01-01

## 2024-04-08 ENCOUNTER — APPOINTMENT (OUTPATIENT)
Dept: ORTHOPEDIC SURGERY | Facility: CLINIC | Age: 59
End: 2024-04-08
Payer: COMMERCIAL

## 2024-04-08 VITALS
DIASTOLIC BLOOD PRESSURE: 100 MMHG | SYSTOLIC BLOOD PRESSURE: 160 MMHG | BODY MASS INDEX: 35.52 KG/M2 | WEIGHT: 221 LBS | HEIGHT: 66 IN

## 2024-04-08 DIAGNOSIS — M54.50 LOW BACK PAIN, UNSPECIFIED: ICD-10-CM

## 2024-04-08 PROCEDURE — 99214 OFFICE O/P EST MOD 30 MIN: CPT

## 2024-04-08 RX ORDER — TIZANIDINE 2 MG/1
2 TABLET ORAL EVERY 6 HOURS
Qty: 56 | Refills: 1 | Status: ACTIVE | COMMUNITY
Start: 2024-04-08 | End: 1900-01-01

## 2024-04-08 NOTE — ADDENDUM
[FreeTextEntry1] :  I, Deann Acosta, acted solely as a scribe for Dr. Darin Conley MD on this date 04/08/2024   All medical record entries made by the Scribe were at my, Dr. Darin Conley MD., direction and personally dictated by me on 04/08/2024. I have reviewed the chart and agree that the record accurately reflects my personal performance of the history, physical exam, assessment and plan. I have also personally directed, reviewed, and agreed with the chart.

## 2024-04-08 NOTE — ASSESSMENT
[FreeTextEntry1] : This is a 59 year female with back pain and RT LE sxs. I had a lengthy discussion with the patient in regard to their treatment plan and diagnosis. Their symptoms have persisted despite the conservative management they have attempted thus far. As a result, I would like to proceed with a lumbar MRI. In tandem with this they should begin a physical therapy/home therapy program. The patient can take Tizanidine, Tylenol/NSAIDs as needed for pain control if medically able to. I will have the patient follow-up in 2 to 3 weeks for repeat clinical evaluation, and to review their MRI results. I encouraged them to follow-up sooner if their symptoms worsen or change in any way.  Conservative Treatment Statement The patient has tried the following treatments: Activity modification + Ice/Compression + Braces - NSAIDS + Physical Therapy +   Please note the above modalities have been tried for 6+ weeks over the past 2 months.

## 2024-04-08 NOTE — PHYSICAL EXAM
[de-identified] : Lumbar Physical Exam  Gait - Normal  Station - Normal  Sagittal balance - Normal  Compensatory mechanism? - None  Heel walk - Normal  Toe walk - Normal  Reflexes Patellar - normal Gastroc - normal Clonus - No  Hip Exam - Normal  Straight leg raise - none  Pulses - 2+ dp/pt  Range of motion -  reduced  Sensation  Sensation intact to light touch in L1, L2, L3, L4, L5 and S1 dermatomes bilaterally  Motor 	IP	Quad	HS	TA	Gastroc	EHL Right	3+/5	5/5	5/5	5/5	5/5	4/5 Left	5/5	5/5	5/5	5/5	5/5	5/5 [de-identified] : Lumbar radiographs L4-L5 spondylolisthesis noted Facet arthropathy noted Disc height loss noted  Lumbar MRI reviewed from April 2021 Mild central and lateral recess stenosis noted at L4-L5 bilaterally

## 2024-04-08 NOTE — HISTORY OF PRESENT ILLNESS
[de-identified] : Patient is a 59-year-old female who presents for f/u eval of back and right leg symptoms. Patient details swelling in RT knee, tingling in toes. Followed up with Orthopedist for R knee. Obtained ALBARO.    09.26.22 Today the patient states that she has had a return of her back and right leg symptoms.  She does feel like she has weakness in her right leg.  Over the past month after her fall she has had worsening of her overall symptoms.  She is here today to discuss neck steps in her care as she is somewhat frustrated with this return of her symptoms.  She denies any bowel bladder issues.  She denies any saddle anesthesia.  04/22 Today the patient states that overall she is doing better.  She did get an epidural steroid injection.  This significantly helped her symptoms for approximately 1 to 2 weeks.  She did have a return of some of her radicular pain.  Currently she is looking forward to working with physical therapy to regain strength and mobility.  She denies any bowel bladder issues.  She denies any saddle anesthesia.  01/28/22 Today the patient states that she is still dealing with back and leg symptoms.  Right side is worse than her left.  She does describe pain which does go down her posterior lateral thigh posterior lateral buttock.  She is looking forward to getting her epidural steroid injection done today.  She denies any bowel bladder issues.  She denies any saddle anesthesia.  11/22/21 Today the patient states that she is still having some occasional back and leg symptoms.  She denies any bowel bladder issues.  She denies any saddle anesthesia.  She has been diligently participating physical therapy although she is still dealing with some residual back pain.  She is here today to discuss neck steps and treatment.  In terms of her leg pain she does describe pain that goes over her right posterior buttock into her posterior lateral thigh.  08/20/21 The patient is doing very well today.  She has had resolution of her back and leg symptoms.  She has taken the epidural which she does feel has helped her quite significantly.  She denies any bowel bladder issues.  She denies any saddle anesthesia.  07/16/21 Today the patient states she is still dealing with significant low back and leg pain.  Her right-sided symptoms are worse than her left.  It does go down her posterior lateral buttock posterior lateral calf posterior lateral thigh.  She denies any bowel bladder issues.  She denies any saddle anesthesia.  The symptoms are interfering with her quality of life.  05/28/21 This is a 56-year-old female here today for evaluation of low back pain and leg pain.  She states that her right-sided leg pain is worse than her left.  It does go down her posterior buttock and posterior thigh.  She does feel like her big toes numb as well.  The symptoms have been going on for at least a year and a half.  She has tried chiropractor, PT but is still having significant pain.  She does endorse weakness.  She can only walk 5 blocks without having to stop due to pain.  She does feel better when  she leans forward on a shopping cart.  She denies any bowel bladder issues.  She denies any saddle anesthesia.

## 2024-04-09 DIAGNOSIS — N39.0 URINARY TRACT INFECTION, SITE NOT SPECIFIED: ICD-10-CM

## 2024-04-09 LAB
BACTERIA UR CULT: ABNORMAL
CYTOLOGY CVX/VAG DOC THIN PREP: NORMAL

## 2024-04-09 RX ORDER — NITROFURANTOIN (MONOHYDRATE/MACROCRYSTALS) 25; 75 MG/1; MG/1
100 CAPSULE ORAL TWICE DAILY
Qty: 10 | Refills: 0 | Status: ACTIVE | COMMUNITY
Start: 2024-04-09 | End: 1900-01-01

## 2024-05-06 NOTE — REASON FOR VISIT
Interventional Radiology  Pre-Procedure Note    This is a 60y  Female  presenting for lung nodule localization    HPI:  61 y/o F with H/O: HTN, Asthma (pt denies prior hospitalizations or intubation), Hypothyroidism, Obesity presents to PST for pre op evaluation with C/O chest pain s/p Covid infections the end of Dec, 2023. Pt was admitted to the hosp. in [place]. Pre op diagnosis: other nonspecific abnormal finding of lung field. Now schedule for left Video Assisted Thoracoscopic surgery (VATS), robotic assisted, left upper lobe wedge resection with IR marking left upper lobe series 4, image 80, left lower lobe wedge resection, possible segmentectomy, mediastinal lymph node dissection tentatively on 05/06/24.    CT angiogram of the chest on12/15/2023:  - multiple nodules in both lungs measuring up to 1.3cm  - air trapping in both lungs  ?  PET/CT on 3/27/2024:  - intense diffuse activity in an enlarged thyroid with calcification in the right lobe and masslike isodense enlargement of the left lobe extending down to the retrosternal region (SUV up to 17.1, image 67)  - few mildly avid bilateral cervical chain nodes, the largest measuring 1.4 x 1.0 cm left level 2A (SUV 3.7, image 39).  - 1.3 x 1.2 cm left lower lobe nodule with mild activity (SUV 2.4, image 117).  - additional scattered bilateral nodules some with mild activity, for example right upper lobe (SUV 2.2, image 93) and some below PET detection.  - mosaic attenuation of the lungs suggests air trapping. (01 May 2024 14:52)      PAST MEDICAL & SURGICAL HISTORY:  HTN (hypertension)      Hypothyroidism      Asthma      Obesity      History of tubal ligation          Social History:     FAMILY HISTORY:      Allergies: No Known Allergies      Current Medications: lactated ringers. 1000 milliLiter(s) IV Continuous <Continuous>      Labs:   from [facility] 4/5/24  H/H 13/43            Assessment/Plan:   This is a 60y Female  presents with left lung nodules  Patient presents to IR for localization.  Procedure/ risks/ benefits/ goals/ alternatives were explained. All questions answered. Informed content obtained from patient. Consent placed in chart.     
[Initial Consultation] : an initial consultation for

## 2024-05-10 ENCOUNTER — APPOINTMENT (OUTPATIENT)
Dept: ORTHOPEDIC SURGERY | Facility: CLINIC | Age: 59
End: 2024-05-10

## 2024-05-21 ENCOUNTER — APPOINTMENT (OUTPATIENT)
Dept: ORTHOPEDIC SURGERY | Facility: CLINIC | Age: 59
End: 2024-05-21
Payer: COMMERCIAL

## 2024-05-21 DIAGNOSIS — M67.442 GANGLION, LEFT HAND: ICD-10-CM

## 2024-05-21 PROCEDURE — 99214 OFFICE O/P EST MOD 30 MIN: CPT | Mod: 25

## 2024-06-14 ENCOUNTER — APPOINTMENT (OUTPATIENT)
Dept: ORTHOPEDIC SURGERY | Facility: CLINIC | Age: 59
End: 2024-06-14
Payer: OTHER MISCELLANEOUS

## 2024-06-14 VITALS
DIASTOLIC BLOOD PRESSURE: 88 MMHG | WEIGHT: 200 LBS | HEART RATE: 59 BPM | BODY MASS INDEX: 32.14 KG/M2 | HEIGHT: 66 IN | SYSTOLIC BLOOD PRESSURE: 139 MMHG

## 2024-06-14 DIAGNOSIS — M54.9 DORSALGIA, UNSPECIFIED: ICD-10-CM

## 2024-06-14 PROCEDURE — 72110 X-RAY EXAM L-2 SPINE 4/>VWS: CPT

## 2024-06-14 PROCEDURE — 99213 OFFICE O/P EST LOW 20 MIN: CPT

## 2024-06-14 NOTE — REASON FOR VISIT
[Workers' Comp: Date of Injury: _______] : This visit is related to worker's compensation. Date of Injury: [unfilled] [Back Pain] : back pain [Radiculopathy] : radiculopathy

## 2024-06-14 NOTE — PHYSICAL EXAM
[de-identified] : Lumbar Physical Exam  Gait - antalgic   Station - Normal  Sagittal balance - Normal  Compensatory mechanism? - None  Heel walk - Normal  Toe walk - Normal  Reflexes Patellar - normal Gastroc - normal Clonus - No  Hip Exam - Normal  Straight leg raise - none  Pulses - 2+ dp/pt  Range of motion -  reduced  Sensation  Sensation intact to light touch in L1, L2, L3, L4, L5 and S1 dermatomes bilaterally  Motor 	IP	Quad HS	TA	Gastroc	EHL Right 3+/5 5/5	5/5	5/5	5/5	4/5 Left	5/5	5/5	5/5	5/5	5/5	5/5 [de-identified] :  Lumbar radiographs L4-L5 spondylolisthesis noted Facet arthropathy noted Disc height loss noted  Lumbar MRI reviewed from April 2021 Mild central and lateral recess stenosis noted at L4-L5 bilaterally

## 2024-06-14 NOTE — HISTORY OF PRESENT ILLNESS
[Yes] : The patient is currently working. [de-identified] : 60 yo female states initial injury is now under Workman Compensation. 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 Denies any bowel or bladder dysfunction or saddle anesthesia. She is doing physical therapy with minimal improvement.   4/8/2024 Patient is a 59-year-old female who presents for f/u eval of back and right leg symptoms. Patient details swelling in RT knee, tingling in toes. Followed up with Orthopedist for R knee. Obtained ALBARO.    09.26.22 Today the patient states that she has had a return of her back and right leg symptoms.  She does feel like she has weakness in her right leg.  Over the past month after her fall she has had worsening of her overall symptoms.  She is here today to discuss neck steps in her care as she is somewhat frustrated with this return of her symptoms.  She denies any bowel bladder issues.  She denies any saddle anesthesia.  04/22 Today the patient states that overall she is doing better.  She did get an epidural steroid injection.  This significantly helped her symptoms for approximately 1 to 2 weeks.  She did have a return of some of her radicular pain.  Currently she is looking forward to working with physical therapy to regain strength and mobility.  She denies any bowel bladder issues.  She denies any saddle anesthesia.  01/28/22 Today the patient states that she is still dealing with back and leg symptoms.  Right side is worse than her left.  She does describe pain which does go down her posterior lateral thigh posterior lateral buttock.  She is looking forward to getting her epidural steroid injection done today.  She denies any bowel bladder issues.  She denies any saddle anesthesia.  11/22/21 Today the patient states that she is still having some occasional back and leg symptoms.  She denies any bowel bladder issues.  She denies any saddle anesthesia.  She has been diligently participating physical therapy although she is still dealing with some residual back pain.  She is here today to discuss neck steps and treatment.  In terms of her leg pain she does describe pain that goes over her right posterior buttock into her posterior lateral thigh.  08/20/21 The patient is doing very well today.  She has had resolution of her back and leg symptoms.  She has taken the epidural which she does feel has helped her quite significantly.  She denies any bowel bladder issues.  She denies any saddle anesthesia.  07/16/21 Today the patient states she is still dealing with significant low back and leg pain.  Her right-sided symptoms are worse than her left.  It does go down her posterior lateral buttock posterior lateral calf posterior lateral thigh.  She denies any bowel bladder issues.  She denies any saddle anesthesia.  The symptoms are interfering with her quality of life.  05/28/21 This is a 56-year-old female here today for evaluation of low back pain and leg pain.  She states that her right-sided leg pain is worse than her left.  It does go down her posterior buttock and posterior thigh.  She does feel like her big toes numb as well.  The symptoms have been going on for at least a year and a half.  She has tried chiropractor, PT but is still having significant pain.  She does endorse weakness.  She can only walk 5 blocks without having to stop due to pain.  She does feel better when  she leans forward on a shopping cart.  She denies any bowel bladder issues.  She denies any saddle anesthesia. 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

## 2024-06-24 ENCOUNTER — APPOINTMENT (OUTPATIENT)
Dept: ORTHOPEDIC SURGERY | Facility: CLINIC | Age: 59
End: 2024-06-24

## 2024-06-24 VITALS
WEIGHT: 200 LBS | HEART RATE: 65 BPM | OXYGEN SATURATION: 95 % | SYSTOLIC BLOOD PRESSURE: 163 MMHG | BODY MASS INDEX: 32.14 KG/M2 | RESPIRATION RATE: 17 BRPM | HEIGHT: 66 IN | DIASTOLIC BLOOD PRESSURE: 98 MMHG

## 2024-06-24 DIAGNOSIS — M25.461 EFFUSION, RIGHT KNEE: ICD-10-CM

## 2024-06-24 DIAGNOSIS — M25.561 PAIN IN RIGHT KNEE: ICD-10-CM

## 2024-06-24 PROCEDURE — 73564 X-RAY EXAM KNEE 4 OR MORE: CPT | Mod: RT

## 2024-06-24 PROCEDURE — 20611 DRAIN/INJ JOINT/BURSA W/US: CPT | Mod: RT

## 2024-06-24 PROCEDURE — 99203 OFFICE O/P NEW LOW 30 MIN: CPT | Mod: 25

## 2024-06-25 LAB
B PERT IGG+IGM PNL SER: ABNORMAL
COLOR FLD: YELLOW
COMMENT: NORMAL
FLUID INTAKE SUBSTANCE CLASS: NORMAL
LYMPHOCYTES # FLD MANUAL: 2 %
MONOS+MACROS NFR FLD MANUAL: 10 %
NEUTS SEG # FLD MANUAL: 88 %
RBC # FLD MANUAL: 4000 /UL
SYCRY CLARITY: ABNORMAL
SYCRY COLOR: YELLOW
SYCRY ID: NORMAL
SYCRY TUBE: NORMAL
TOTAL CELLS COUNTED FLD: NORMAL /UL
TUBE TYPE: NORMAL

## 2024-06-28 ENCOUNTER — APPOINTMENT (OUTPATIENT)
Dept: ORTHOPEDIC SURGERY | Facility: CLINIC | Age: 59
End: 2024-06-28

## 2024-06-30 LAB — BACTERIA FLD CULT: NORMAL

## 2024-07-16 ENCOUNTER — APPOINTMENT (OUTPATIENT)
Dept: MRI IMAGING | Facility: CLINIC | Age: 59
End: 2024-07-16
Payer: OTHER MISCELLANEOUS

## 2024-07-16 ENCOUNTER — OUTPATIENT (OUTPATIENT)
Dept: OUTPATIENT SERVICES | Facility: HOSPITAL | Age: 59
LOS: 1 days | End: 2024-07-16
Payer: COMMERCIAL

## 2024-07-16 DIAGNOSIS — M25.461 EFFUSION, RIGHT KNEE: ICD-10-CM

## 2024-07-16 PROCEDURE — 72148 MRI LUMBAR SPINE W/O DYE: CPT | Mod: 26

## 2024-07-16 PROCEDURE — 73721 MRI JNT OF LWR EXTRE W/O DYE: CPT

## 2024-07-16 PROCEDURE — 72148 MRI LUMBAR SPINE W/O DYE: CPT

## 2024-07-16 PROCEDURE — 73721 MRI JNT OF LWR EXTRE W/O DYE: CPT | Mod: 26,RT

## 2024-07-16 RX ORDER — DIAZEPAM 10 MG/1
10 TABLET ORAL
Qty: 1 | Refills: 0 | Status: ACTIVE | COMMUNITY
Start: 2024-07-16 | End: 1900-01-01

## 2024-07-31 ENCOUNTER — APPOINTMENT (OUTPATIENT)
Dept: ORTHOPEDIC SURGERY | Facility: CLINIC | Age: 59
End: 2024-07-31

## 2024-08-01 ENCOUNTER — APPOINTMENT (OUTPATIENT)
Dept: ORTHOPEDIC SURGERY | Facility: CLINIC | Age: 59
End: 2024-08-01
Payer: COMMERCIAL

## 2024-08-01 DIAGNOSIS — R22.32 LOCALIZED SWELLING, MASS AND LUMP, LEFT UPPER LIMB: ICD-10-CM

## 2024-08-01 DIAGNOSIS — M67.442 GANGLION, LEFT HAND: ICD-10-CM

## 2024-08-01 PROCEDURE — 26160 REMOVE TENDON SHEATH LESION: CPT | Mod: F4

## 2024-08-05 LAB — CORE LAB BIOPSY: NORMAL

## 2024-08-07 ENCOUNTER — APPOINTMENT (OUTPATIENT)
Dept: ORTHOPEDIC SURGERY | Facility: CLINIC | Age: 59
End: 2024-08-07

## 2024-08-07 PROCEDURE — 99214 OFFICE O/P EST MOD 30 MIN: CPT

## 2024-08-07 NOTE — END OF VISIT
"Request for medication refill: simvastatin (ZOCOR) 20 MG tablet    Providers if patient needs an appointment and you are willing to give a one month supply please refill for one month and  send a letter/MyChart using \".SMILLIMITEDREFILL\" .smillimited and route chart to \"[facility] \" (Giving one month refill in non controlled medications is strongly recommended before denial)    If refill has been denied, meaning absolutely no refills without visit, please complete the smart phrase \".smirxrefuse\" and route it to the \"[facility] MED REFILLS\"  pool to inform the patient and the pharmacy.    Ruby Fernandez, [facility]        "
[Time Spent: ___ minutes] : I have spent [unfilled] minutes of time on the encounter.

## 2024-08-07 NOTE — PHYSICAL EXAM
[de-identified] : Lumbar Physical Exam  Gait - antalgic   Station - Normal  Sagittal balance - Normal  Compensatory mechanism? - None  Heel walk - Normal  Toe walk - Normal  Reflexes Patellar - normal Gastroc - normal Clonus - No  Hip Exam - Normal  Straight leg raise - none  Pulses - 2+ dp/pt  Range of motion -  reduced  Sensation  Sensation intact to light touch in L1, L2, L3, L4, L5 and S1 dermatomes bilaterally  Motor 	IP	Quad HS	TA	Gastroc	EHL Right 5/5 5/5	5/5	4+/5	5/5	5/5 Left	5/5	5/5	5/5	5/5	5/5	5/5 [de-identified] : Lumbar MRI No critical areas of stenosis  Lumbar radiographs L4-L5 spondylolisthesis noted Facet arthropathy noted Disc height loss noted  Lumbar MRI reviewed from April 2021 Mild central and lateral recess stenosis noted at L4-L5 bilaterally

## 2024-08-07 NOTE — HISTORY OF PRESENT ILLNESS
[Yes] : The patient is currently working. [de-identified] : Patient is a 59-year-old female who presents for f/u eval of bp. Patient states sxs remain unchanged. Denies any LE sxs.  06.14.24 60 yo female states initial injury is now under Workman Compensation. 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 Denies any bowel or bladder dysfunction or saddle anesthesia. She is doing physical therapy with minimal improvement.   4/8/2024 Patient is a 59-year-old female who presents for f/u eval of back and right leg symptoms. Patient details swelling in RT knee, tingling in toes. Followed up with Orthopedist for R knee. Obtained ALBARO.    09.26.22 Today the patient states that she has had a return of her back and right leg symptoms.  She does feel like she has weakness in her right leg.  Over the past month after her fall she has had worsening of her overall symptoms.  She is here today to discuss neck steps in her care as she is somewhat frustrated with this return of her symptoms.  She denies any bowel bladder issues.  She denies any saddle anesthesia.  04/22 Today the patient states that overall she is doing better.  She did get an epidural steroid injection.  This significantly helped her symptoms for approximately 1 to 2 weeks.  She did have a return of some of her radicular pain.  Currently she is looking forward to working with physical therapy to regain strength and mobility.  She denies any bowel bladder issues.  She denies any saddle anesthesia.  01/28/22 Today the patient states that she is still dealing with back and leg symptoms.  Right side is worse than her left.  She does describe pain which does go down her posterior lateral thigh posterior lateral buttock.  She is looking forward to getting her epidural steroid injection done today.  She denies any bowel bladder issues.  She denies any saddle anesthesia.  11/22/21 Today the patient states that she is still having some occasional back and leg symptoms.  She denies any bowel bladder issues.  She denies any saddle anesthesia.  She has been diligently participating physical therapy although she is still dealing with some residual back pain.  She is here today to discuss neck steps and treatment.  In terms of her leg pain she does describe pain that goes over her right posterior buttock into her posterior lateral thigh.  08/20/21 The patient is doing very well today.  She has had resolution of her back and leg symptoms.  She has taken the epidural which she does feel has helped her quite significantly.  She denies any bowel bladder issues.  She denies any saddle anesthesia.  07/16/21 Today the patient states she is still dealing with significant low back and leg pain.  Her right-sided symptoms are worse than her left.  It does go down her posterior lateral buttock posterior lateral calf posterior lateral thigh.  She denies any bowel bladder issues.  She denies any saddle anesthesia.  The symptoms are interfering with her quality of life.  05/28/21 This is a 56-year-old female here today for evaluation of low back pain and leg pain.  She states that her right-sided leg pain is worse than her left.  It does go down her posterior buttock and posterior thigh.  She does feel like her big toes numb as well.  The symptoms have been going on for at least a year and a half.  She has tried chiropractor, PT but is still having significant pain.  She does endorse weakness.  She can only walk 5 blocks without having to stop due to pain.  She does feel better when  she leans forward on a shopping cart.  She denies any bowel bladder issues.  She denies any saddle anesthesia. 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

## 2024-08-07 NOTE — ADDENDUM
[FreeTextEntry1] :  I, Deann Acosta, acted solely as a scribe for Dr. Darin Conley MD on this date 08/07/2024   All medical record entries made by the Scribe were at my, Dr. Darin Conley MD., direction and personally dictated by me on 08/07/2024. I have reviewed the chart and agree that the record accurately reflects my personal performance of the history, physical exam, assessment and plan. I have also personally directed, reviewed, and agreed with the chart.

## 2024-08-07 NOTE — ASSESSMENT
[FreeTextEntry1] : I had a lengthy discussion with the patient in regard to their treatment plan and diagnosis. Their symptoms have persisted despite the conservative management they have attempted thus far. As a result, I would like to proceed with a referral to a pain management specialist to see if they are eligible for an epidural injection and other non-operative treatment options. In tandem with this they should continue with physical therapy/home therapy program. The patient can take Tizanidine, Tylenol/NSAIDs as needed for pain control if medically able to. I will have patient follow-up in 4 to 5 weeks for repeat clinical evaluation. I encouraged them to follow-up sooner if their symptoms worsen or change in any way.

## 2024-08-22 ENCOUNTER — APPOINTMENT (OUTPATIENT)
Dept: ORTHOPEDIC SURGERY | Facility: CLINIC | Age: 59
End: 2024-08-22
Payer: COMMERCIAL

## 2024-08-22 DIAGNOSIS — R22.32 LOCALIZED SWELLING, MASS AND LUMP, LEFT UPPER LIMB: ICD-10-CM

## 2024-08-22 PROCEDURE — 99024 POSTOP FOLLOW-UP VISIT: CPT

## 2024-08-23 ENCOUNTER — APPOINTMENT (OUTPATIENT)
Dept: ORTHOPEDIC SURGERY | Facility: CLINIC | Age: 59
End: 2024-08-23

## 2024-08-28 ENCOUNTER — APPOINTMENT (OUTPATIENT)
Dept: ORTHOPEDIC SURGERY | Facility: CLINIC | Age: 59
End: 2024-08-28

## 2024-09-09 ENCOUNTER — APPOINTMENT (OUTPATIENT)
Dept: ORTHOPEDIC SURGERY | Facility: CLINIC | Age: 59
End: 2024-09-09

## 2024-09-09 VITALS
RESPIRATION RATE: 17 BRPM | SYSTOLIC BLOOD PRESSURE: 125 MMHG | HEART RATE: 62 BPM | DIASTOLIC BLOOD PRESSURE: 83 MMHG | OXYGEN SATURATION: 97 % | HEIGHT: 66 IN

## 2024-09-09 DIAGNOSIS — M25.561 PAIN IN RIGHT KNEE: ICD-10-CM

## 2024-09-09 PROCEDURE — 99213 OFFICE O/P EST LOW 20 MIN: CPT

## 2024-09-16 ENCOUNTER — NON-APPOINTMENT (OUTPATIENT)
Age: 59
End: 2024-09-16

## 2024-09-17 ENCOUNTER — APPOINTMENT (OUTPATIENT)
Dept: CARDIOLOGY | Facility: CLINIC | Age: 59
End: 2024-09-17
Payer: COMMERCIAL

## 2024-09-17 ENCOUNTER — NON-APPOINTMENT (OUTPATIENT)
Age: 59
End: 2024-09-17

## 2024-09-17 VITALS
DIASTOLIC BLOOD PRESSURE: 80 MMHG | HEIGHT: 66 IN | WEIGHT: 220 LBS | BODY MASS INDEX: 35.36 KG/M2 | HEART RATE: 76 BPM | SYSTOLIC BLOOD PRESSURE: 124 MMHG | OXYGEN SATURATION: 98 %

## 2024-09-17 VITALS — SYSTOLIC BLOOD PRESSURE: 122 MMHG | DIASTOLIC BLOOD PRESSURE: 76 MMHG

## 2024-09-17 DIAGNOSIS — I10 ESSENTIAL (PRIMARY) HYPERTENSION: ICD-10-CM

## 2024-09-17 DIAGNOSIS — E78.00 PURE HYPERCHOLESTEROLEMIA, UNSPECIFIED: ICD-10-CM

## 2024-09-17 DIAGNOSIS — E66.9 OBESITY, UNSPECIFIED: ICD-10-CM

## 2024-09-17 PROCEDURE — 93000 ELECTROCARDIOGRAM COMPLETE: CPT

## 2024-09-17 PROCEDURE — 99204 OFFICE O/P NEW MOD 45 MIN: CPT | Mod: 25

## 2024-09-17 RX ORDER — AMLODIPINE BESYLATE 5 MG/1
5 TABLET ORAL
Refills: 0 | Status: ACTIVE | COMMUNITY

## 2024-09-17 RX ORDER — ATORVASTATIN CALCIUM 10 MG/1
10 TABLET, FILM COATED ORAL
Refills: 0 | Status: ACTIVE | COMMUNITY

## 2024-09-17 NOTE — DISCUSSION/SUMMARY
[FreeTextEntry1] : Reassurance. Advised diet, exercise and wt loss Agree with meds as prescribed Contin home BP monitoring Follow up prn at patient her her PCP discretion. As she is asymptomatic and normal exam no addit CV testing indicated at this time.

## 2024-09-17 NOTE — HISTORY OF PRESENT ILLNESS
[FreeTextEntry1] : DELANO CARNEY is a 59 year old female referred for cardio eval by a doctor at her job. She has hist of white coat HTN. BP at home most recently 117-120/70s. Recently started on statin. Recent labs provided on her phone for my review. LDL was 177. She has gained weight. Not exercising.  Soc - never smoked. Fam hist - no heart disease.

## 2024-10-01 ENCOUNTER — APPOINTMENT (OUTPATIENT)
Dept: INTERNAL MEDICINE | Facility: CLINIC | Age: 59
End: 2024-10-01

## 2024-11-05 ENCOUNTER — APPOINTMENT (OUTPATIENT)
Dept: OPHTHALMOLOGY | Facility: CLINIC | Age: 59
End: 2024-11-05

## 2025-01-29 ENCOUNTER — APPOINTMENT (OUTPATIENT)
Dept: SURGICAL ONCOLOGY | Facility: CLINIC | Age: 60
End: 2025-01-29

## 2025-02-03 ENCOUNTER — RESULT REVIEW (OUTPATIENT)
Age: 60
End: 2025-02-03

## 2025-02-04 ENCOUNTER — RESULT REVIEW (OUTPATIENT)
Age: 60
End: 2025-02-04

## 2025-02-04 ENCOUNTER — APPOINTMENT (OUTPATIENT)
Dept: MAMMOGRAPHY | Facility: CLINIC | Age: 60
End: 2025-02-04
Payer: COMMERCIAL

## 2025-02-04 ENCOUNTER — APPOINTMENT (OUTPATIENT)
Dept: ULTRASOUND IMAGING | Facility: CLINIC | Age: 60
End: 2025-02-04
Payer: COMMERCIAL

## 2025-02-04 PROCEDURE — G0279: CPT

## 2025-02-04 PROCEDURE — 76641 ULTRASOUND BREAST COMPLETE: CPT | Mod: 50

## 2025-02-04 PROCEDURE — 77066 DX MAMMO INCL CAD BI: CPT

## 2025-02-26 ENCOUNTER — APPOINTMENT (OUTPATIENT)
Dept: SURGICAL ONCOLOGY | Facility: CLINIC | Age: 60
End: 2025-02-26
Payer: COMMERCIAL

## 2025-02-26 VITALS
HEART RATE: 69 BPM | SYSTOLIC BLOOD PRESSURE: 140 MMHG | DIASTOLIC BLOOD PRESSURE: 82 MMHG | OXYGEN SATURATION: 99 % | BODY MASS INDEX: 35.36 KG/M2 | HEIGHT: 66 IN | WEIGHT: 220 LBS

## 2025-02-26 DIAGNOSIS — M94.0 CHONDROCOSTAL JUNCTION SYNDROME [TIETZE]: ICD-10-CM

## 2025-02-26 PROCEDURE — 99214 OFFICE O/P EST MOD 30 MIN: CPT

## 2025-02-26 RX ORDER — IBUPROFEN 600 MG/1
600 TABLET, FILM COATED ORAL
Qty: 60 | Refills: 3 | Status: ACTIVE | COMMUNITY
Start: 2025-02-26 | End: 1900-01-01

## 2025-07-10 ENCOUNTER — RX RENEWAL (OUTPATIENT)
Age: 60
End: 2025-07-10

## 2025-08-19 ENCOUNTER — APPOINTMENT (OUTPATIENT)
Dept: INTERNAL MEDICINE | Facility: CLINIC | Age: 60
End: 2025-08-19

## 2025-08-19 DIAGNOSIS — E66.9 OBESITY, UNSPECIFIED: ICD-10-CM

## 2025-08-19 DIAGNOSIS — I10 ESSENTIAL (PRIMARY) HYPERTENSION: ICD-10-CM

## 2025-08-19 DIAGNOSIS — E78.00 PURE HYPERCHOLESTEROLEMIA, UNSPECIFIED: ICD-10-CM

## 2025-08-19 DIAGNOSIS — Z00.00 ENCOUNTER FOR GENERAL ADULT MEDICAL EXAMINATION W/OUT ABNORMAL FINDINGS: ICD-10-CM

## 2025-09-15 ENCOUNTER — NON-APPOINTMENT (OUTPATIENT)
Age: 60
End: 2025-09-15

## 2025-09-15 ENCOUNTER — APPOINTMENT (OUTPATIENT)
Dept: OBGYN | Facility: CLINIC | Age: 60
End: 2025-09-15
Payer: COMMERCIAL

## 2025-09-15 VITALS — DIASTOLIC BLOOD PRESSURE: 83 MMHG | WEIGHT: 210 LBS | SYSTOLIC BLOOD PRESSURE: 140 MMHG | BODY MASS INDEX: 33.9 KG/M2

## 2025-09-15 DIAGNOSIS — M54.50 LOW BACK PAIN, UNSPECIFIED: ICD-10-CM

## 2025-09-15 DIAGNOSIS — Z13.31 ENCOUNTER FOR SCREENING FOR DEPRESSION: ICD-10-CM

## 2025-09-15 DIAGNOSIS — Z01.419 ENCOUNTER FOR GYNECOLOGICAL EXAMINATION (GENERAL) (ROUTINE) W/OUT ABNORMAL FINDINGS: ICD-10-CM

## 2025-09-15 PROCEDURE — 99396 PREV VISIT EST AGE 40-64: CPT

## 2025-09-15 PROCEDURE — 99459 PELVIC EXAMINATION: CPT | Mod: NC

## 2025-09-15 PROCEDURE — G0444 DEPRESSION SCREEN ANNUAL: CPT | Mod: 59

## 2025-09-15 RX ORDER — MELOXICAM 5 MG/1
5 CAPSULE ORAL DAILY
Qty: 20 | Refills: 1 | Status: ACTIVE | COMMUNITY
Start: 2025-09-15 | End: 1900-01-01

## 2025-09-24 LAB
CYTOLOGY CVX/VAG DOC THIN PREP: NORMAL
HPV HIGH+LOW RISK DNA PNL CVX: NOT DETECTED